# Patient Record
Sex: MALE | Race: WHITE | NOT HISPANIC OR LATINO | Employment: OTHER | URBAN - METROPOLITAN AREA
[De-identification: names, ages, dates, MRNs, and addresses within clinical notes are randomized per-mention and may not be internally consistent; named-entity substitution may affect disease eponyms.]

---

## 2017-05-03 ENCOUNTER — PREPPED CHART (OUTPATIENT)
Dept: URBAN - METROPOLITAN AREA CLINIC 43 | Facility: CLINIC | Age: 82
End: 2017-05-03

## 2017-06-09 ASSESSMENT — TONOMETRY: OS_IOP_MMHG: 13

## 2017-06-09 ASSESSMENT — VISUAL ACUITY
OD_SC: 20/30-2
OS_SC: 20/50-2

## 2017-06-16 ENCOUNTER — ESTABLISHED PATIENT (OUTPATIENT)
Dept: URBAN - METROPOLITAN AREA CLINIC 43 | Facility: CLINIC | Age: 82
End: 2017-06-16

## 2017-06-16 DIAGNOSIS — H35.3132: ICD-10-CM

## 2017-06-16 DIAGNOSIS — H34.8320: ICD-10-CM

## 2017-06-16 DIAGNOSIS — H43.813: ICD-10-CM

## 2017-06-16 DIAGNOSIS — E11.9: ICD-10-CM

## 2017-06-16 DIAGNOSIS — H35.352: ICD-10-CM

## 2017-06-16 DIAGNOSIS — H59.022: ICD-10-CM

## 2017-06-16 DIAGNOSIS — Z96.1: ICD-10-CM

## 2017-06-16 PROCEDURE — 92012 INTRM OPH EXAM EST PATIENT: CPT

## 2017-06-16 PROCEDURE — 9222650 BILAT EXTENDED OPHTHALMOSCOPY, F/U

## 2017-06-16 PROCEDURE — 92134 CPTRZ OPH DX IMG PST SGM RTA: CPT

## 2017-06-16 ASSESSMENT — VISUAL ACUITY
OD_SC: 20/30-2
OS_SC: 20/40-2
OD_CC: 20/40+1
OS_CC: 20/200

## 2017-06-16 ASSESSMENT — TONOMETRY
OS_IOP_MMHG: 10
OD_IOP_MMHG: 09

## 2017-07-05 ENCOUNTER — ESTABLISHED PATIENT (OUTPATIENT)
Dept: URBAN - METROPOLITAN AREA CLINIC 43 | Facility: CLINIC | Age: 82
End: 2017-07-05

## 2017-07-05 DIAGNOSIS — H35.352: ICD-10-CM

## 2017-07-05 PROCEDURE — 67028 INJECTION EYE DRUG: CPT

## 2017-07-05 ASSESSMENT — VISUAL ACUITY
OS_SC: 20/50-2
OD_SC: 20/30-1+2

## 2017-07-05 ASSESSMENT — TONOMETRY: OS_IOP_MMHG: 14

## 2017-09-13 ENCOUNTER — ESTABLISHED PATIENT (OUTPATIENT)
Dept: URBAN - METROPOLITAN AREA CLINIC 43 | Facility: CLINIC | Age: 82
End: 2017-09-13

## 2017-09-13 DIAGNOSIS — H35.352: ICD-10-CM

## 2017-09-13 PROCEDURE — 67028 INJECTION EYE DRUG: CPT

## 2017-09-14 ASSESSMENT — VISUAL ACUITY
OS_SC: 20/40
OD_SC: 20/40-1

## 2017-09-14 ASSESSMENT — TONOMETRY: OS_IOP_MMHG: 11

## 2017-10-13 ENCOUNTER — ESTABLISHED PATIENT (OUTPATIENT)
Dept: URBAN - METROPOLITAN AREA CLINIC 43 | Facility: CLINIC | Age: 82
End: 2017-10-13

## 2017-10-13 DIAGNOSIS — H35.3132: ICD-10-CM

## 2017-10-13 DIAGNOSIS — H43.813: ICD-10-CM

## 2017-10-13 DIAGNOSIS — E11.9: ICD-10-CM

## 2017-10-13 DIAGNOSIS — H34.8320: ICD-10-CM

## 2017-10-13 DIAGNOSIS — H35.352: ICD-10-CM

## 2017-10-13 PROCEDURE — 92134 CPTRZ OPH DX IMG PST SGM RTA: CPT

## 2017-10-13 PROCEDURE — 92014 COMPRE OPH EXAM EST PT 1/>: CPT

## 2017-10-13 PROCEDURE — 9222650 BILAT EXTENDED OPHTHALMOSCOPY, F/U

## 2017-10-13 ASSESSMENT — VISUAL ACUITY
OD_SC: 20/40-2
OS_SC: 20/60+2
OS_PH: 20/50-2

## 2017-10-13 ASSESSMENT — TONOMETRY
OS_IOP_MMHG: 13
OD_IOP_MMHG: 9

## 2017-10-18 ENCOUNTER — ESTABLISHED PATIENT (OUTPATIENT)
Dept: URBAN - METROPOLITAN AREA CLINIC 43 | Facility: CLINIC | Age: 82
End: 2017-10-18

## 2017-10-18 DIAGNOSIS — H35.352: ICD-10-CM

## 2017-10-18 PROCEDURE — 67028 INJECTION EYE DRUG: CPT

## 2017-10-18 ASSESSMENT — TONOMETRY: OS_IOP_MMHG: 15

## 2017-10-18 ASSESSMENT — VISUAL ACUITY
OD_SC: 20/40+1
OS_SC: 20/50

## 2017-11-20 ENCOUNTER — ESTABLISHED PATIENT (OUTPATIENT)
Dept: URBAN - METROPOLITAN AREA CLINIC 43 | Facility: CLINIC | Age: 82
End: 2017-11-20

## 2017-11-20 DIAGNOSIS — H59.022: ICD-10-CM

## 2017-11-20 DIAGNOSIS — H43.813: ICD-10-CM

## 2017-11-20 DIAGNOSIS — H40.053: ICD-10-CM

## 2017-11-20 DIAGNOSIS — Z96.1: ICD-10-CM

## 2017-11-20 DIAGNOSIS — E11.9: ICD-10-CM

## 2017-11-20 DIAGNOSIS — H35.3132: ICD-10-CM

## 2017-11-20 DIAGNOSIS — H35.352: ICD-10-CM

## 2017-11-20 DIAGNOSIS — H34.8320: ICD-10-CM

## 2017-11-20 PROCEDURE — 92134 CPTRZ OPH DX IMG PST SGM RTA: CPT

## 2017-11-20 PROCEDURE — 9222650 BILAT EXTENDED OPHTHALMOSCOPY, F/U

## 2017-11-20 PROCEDURE — 92014 COMPRE OPH EXAM EST PT 1/>: CPT

## 2017-11-20 ASSESSMENT — VISUAL ACUITY
OD_SC: 20/40+1
OS_SC: 20/60-2

## 2017-11-20 ASSESSMENT — TONOMETRY
OS_IOP_MMHG: 15
OD_IOP_MMHG: 14

## 2017-12-11 ENCOUNTER — IOP CHECK (OUTPATIENT)
Dept: URBAN - METROPOLITAN AREA CLINIC 43 | Facility: CLINIC | Age: 82
End: 2017-12-11

## 2017-12-11 DIAGNOSIS — H40.1132: ICD-10-CM

## 2017-12-11 PROCEDURE — 92083 EXTENDED VISUAL FIELD XM: CPT

## 2017-12-11 PROCEDURE — 92012 INTRM OPH EXAM EST PATIENT: CPT

## 2017-12-11 ASSESSMENT — VISUAL ACUITY
OD_SC: J12
OS_CC: J12
OS_SC: J12
OD_SC: 20/30+2
OS_SC: 20/80+1
OD_CC: J2

## 2017-12-11 ASSESSMENT — TONOMETRY
OS_IOP_MMHG: 10
OD_IOP_MMHG: 09

## 2017-12-20 ENCOUNTER — ESTABLISHED PATIENT (OUTPATIENT)
Dept: URBAN - METROPOLITAN AREA CLINIC 43 | Facility: CLINIC | Age: 82
End: 2017-12-20

## 2017-12-20 DIAGNOSIS — H34.8320: ICD-10-CM

## 2017-12-20 PROCEDURE — 67028 INJECTION EYE DRUG: CPT

## 2017-12-20 ASSESSMENT — TONOMETRY: OS_IOP_MMHG: 12

## 2017-12-20 ASSESSMENT — VISUAL ACUITY
OS_SC: 20/100-1
OD_SC: 20/40-1+1

## 2018-03-09 ENCOUNTER — ESTABLISHED PATIENT (OUTPATIENT)
Dept: URBAN - METROPOLITAN AREA CLINIC 43 | Facility: CLINIC | Age: 83
End: 2018-03-09

## 2018-03-09 DIAGNOSIS — E11.9: ICD-10-CM

## 2018-03-09 DIAGNOSIS — H35.3132: ICD-10-CM

## 2018-03-09 DIAGNOSIS — H34.8320: ICD-10-CM

## 2018-03-09 DIAGNOSIS — H43.813: ICD-10-CM

## 2018-03-09 DIAGNOSIS — H35.352: ICD-10-CM

## 2018-03-09 PROCEDURE — 92012 INTRM OPH EXAM EST PATIENT: CPT

## 2018-03-09 PROCEDURE — 92134 CPTRZ OPH DX IMG PST SGM RTA: CPT

## 2018-03-09 PROCEDURE — 9222650 BILAT EXTENDED OPHTHALMOSCOPY, F/U

## 2018-03-09 ASSESSMENT — VISUAL ACUITY
OD_SC: 20/40-2
OS_SC: 20/40-1+1

## 2018-03-09 ASSESSMENT — TONOMETRY
OS_IOP_MMHG: 13
OD_IOP_MMHG: 11

## 2018-04-27 ENCOUNTER — ESTABLISHED PATIENT (OUTPATIENT)
Dept: URBAN - METROPOLITAN AREA CLINIC 43 | Facility: CLINIC | Age: 83
End: 2018-04-27

## 2018-04-27 DIAGNOSIS — H35.3132: ICD-10-CM

## 2018-04-27 DIAGNOSIS — H59.022: ICD-10-CM

## 2018-04-27 DIAGNOSIS — H35.352: ICD-10-CM

## 2018-04-27 DIAGNOSIS — H43.813: ICD-10-CM

## 2018-04-27 DIAGNOSIS — E11.9: ICD-10-CM

## 2018-04-27 DIAGNOSIS — H34.8320: ICD-10-CM

## 2018-04-27 PROCEDURE — 92134 CPTRZ OPH DX IMG PST SGM RTA: CPT

## 2018-04-27 PROCEDURE — 9222650 BILAT EXTENDED OPHTHALMOSCOPY, F/U

## 2018-04-27 PROCEDURE — 92014 COMPRE OPH EXAM EST PT 1/>: CPT

## 2018-04-27 ASSESSMENT — VISUAL ACUITY
OS_SC: 20/40-1
OD_SC: 20/40+2

## 2018-04-27 ASSESSMENT — TONOMETRY
OD_IOP_MMHG: 12
OS_IOP_MMHG: 14

## 2018-05-16 ENCOUNTER — CLINIC PROCEDURE ONLY (OUTPATIENT)
Dept: URBAN - METROPOLITAN AREA CLINIC 43 | Facility: CLINIC | Age: 83
End: 2018-05-16

## 2018-05-16 DIAGNOSIS — H34.8320: ICD-10-CM

## 2018-05-16 PROCEDURE — 67028 INJECTION EYE DRUG: CPT

## 2018-05-16 ASSESSMENT — VISUAL ACUITY
OD_SC: 20/50+1
OS_SC: 20/50+2

## 2018-05-16 ASSESSMENT — TONOMETRY: OS_IOP_MMHG: 11

## 2018-06-11 ENCOUNTER — ESTABLISHED COMPREHENSIVE EXAM (OUTPATIENT)
Dept: URBAN - METROPOLITAN AREA CLINIC 43 | Facility: CLINIC | Age: 83
End: 2018-06-11

## 2018-06-11 DIAGNOSIS — H43.813: ICD-10-CM

## 2018-06-11 DIAGNOSIS — H35.352: ICD-10-CM

## 2018-06-11 DIAGNOSIS — H40.1132: ICD-10-CM

## 2018-06-11 DIAGNOSIS — H34.8320: ICD-10-CM

## 2018-06-11 DIAGNOSIS — E11.9: ICD-10-CM

## 2018-06-11 DIAGNOSIS — H35.3132: ICD-10-CM

## 2018-06-11 PROCEDURE — 92014 COMPRE OPH EXAM EST PT 1/>: CPT

## 2018-06-11 PROCEDURE — 92015 DETERMINE REFRACTIVE STATE: CPT

## 2018-06-11 ASSESSMENT — VISUAL ACUITY
OS_SC: J12+
OD_SC: J12
OD_CC: J2-
OS_CC: J1-
OS_SC: 20/30-2
OD_BAT: 20/200
OD_SC: 20/30-1
OS_BAT: 20/80

## 2018-06-11 ASSESSMENT — TONOMETRY
OD_IOP_MMHG: 6
OS_IOP_MMHG: 8

## 2018-07-13 ENCOUNTER — ESTABLISHED PATIENT (OUTPATIENT)
Dept: URBAN - METROPOLITAN AREA CLINIC 43 | Facility: CLINIC | Age: 83
End: 2018-07-13

## 2018-07-13 DIAGNOSIS — H34.8320: ICD-10-CM

## 2018-07-13 DIAGNOSIS — E11.9: ICD-10-CM

## 2018-07-13 DIAGNOSIS — H35.3122: ICD-10-CM

## 2018-07-13 DIAGNOSIS — H35.352: ICD-10-CM

## 2018-07-13 DIAGNOSIS — H43.813: ICD-10-CM

## 2018-07-13 DIAGNOSIS — H35.3211: ICD-10-CM

## 2018-07-13 DIAGNOSIS — H35.363: ICD-10-CM

## 2018-07-13 PROCEDURE — 92014 COMPRE OPH EXAM EST PT 1/>: CPT

## 2018-07-13 PROCEDURE — 92134 CPTRZ OPH DX IMG PST SGM RTA: CPT

## 2018-07-13 PROCEDURE — 92235 FLUORESCEIN ANGRPH MLTIFRAME: CPT

## 2018-07-13 PROCEDURE — 9222550 BILAT EXTENDED OPHTHALMOSCOPY, FIRST

## 2018-07-13 PROCEDURE — 92250 FUNDUS PHOTOGRAPHY W/I&R: CPT

## 2018-07-13 ASSESSMENT — TONOMETRY
OD_IOP_MMHG: 09
OS_IOP_MMHG: 10

## 2018-07-13 ASSESSMENT — VISUAL ACUITY
OS_SC: 20/40+1
OD_SC: 20/40+1

## 2018-07-25 ENCOUNTER — CLINIC PROCEDURE ONLY (OUTPATIENT)
Dept: URBAN - METROPOLITAN AREA CLINIC 43 | Facility: CLINIC | Age: 83
End: 2018-07-25

## 2018-07-25 VITALS — HEART RATE: 56 BPM | SYSTOLIC BLOOD PRESSURE: 160 MMHG | HEIGHT: 60 IN | DIASTOLIC BLOOD PRESSURE: 75 MMHG

## 2018-07-25 DIAGNOSIS — H35.3211: ICD-10-CM

## 2018-07-25 DIAGNOSIS — H35.3122: ICD-10-CM

## 2018-07-25 DIAGNOSIS — H34.8320: ICD-10-CM

## 2018-07-25 PROCEDURE — 6702850 BILATERAL INTRAVITREAL INJECTION

## 2018-07-25 ASSESSMENT — VISUAL ACUITY
OS_SC: 20/40+1
OD_SC: 20/40-1+1

## 2018-07-25 ASSESSMENT — TONOMETRY
OD_IOP_MMHG: 10
OS_IOP_MMHG: 11

## 2018-08-23 ENCOUNTER — ESTABLISHED PATIENT (OUTPATIENT)
Dept: URBAN - METROPOLITAN AREA CLINIC 43 | Facility: CLINIC | Age: 83
End: 2018-08-23

## 2018-08-23 DIAGNOSIS — H35.3211: ICD-10-CM

## 2018-08-23 DIAGNOSIS — H34.8320: ICD-10-CM

## 2018-08-23 DIAGNOSIS — H35.352: ICD-10-CM

## 2018-08-23 DIAGNOSIS — H35.3122: ICD-10-CM

## 2018-08-23 DIAGNOSIS — E11.9: ICD-10-CM

## 2018-08-23 DIAGNOSIS — H35.363: ICD-10-CM

## 2018-08-23 DIAGNOSIS — H43.813: ICD-10-CM

## 2018-08-23 PROCEDURE — 92012 INTRM OPH EXAM EST PATIENT: CPT

## 2018-08-23 PROCEDURE — 9222650 BILAT EXTENDED OPHTHALMOSCOPY, F/U

## 2018-08-23 PROCEDURE — 92134 CPTRZ OPH DX IMG PST SGM RTA: CPT

## 2018-08-23 ASSESSMENT — VISUAL ACUITY
OS_SC: 20/40+2
OD_SC: 20/40-2

## 2018-08-23 ASSESSMENT — TONOMETRY
OS_IOP_MMHG: 12
OD_IOP_MMHG: 10

## 2018-09-26 ENCOUNTER — CLINIC PROCEDURE ONLY (OUTPATIENT)
Dept: URBAN - METROPOLITAN AREA CLINIC 43 | Facility: CLINIC | Age: 83
End: 2018-09-26

## 2018-09-26 DIAGNOSIS — H35.3122: ICD-10-CM

## 2018-09-26 DIAGNOSIS — H35.3211: ICD-10-CM

## 2018-09-26 PROCEDURE — 6702850 BILATERAL INTRAVITREAL INJECTION

## 2018-09-26 ASSESSMENT — TONOMETRY
OD_IOP_MMHG: 10
OS_IOP_MMHG: 11

## 2018-09-26 ASSESSMENT — VISUAL ACUITY
OD_SC: 20/50+2
OS_SC: 20/40+2

## 2019-01-22 ENCOUNTER — ESTABLISHED COMPREHENSIVE EXAM (OUTPATIENT)
Dept: URBAN - METROPOLITAN AREA CLINIC 39 | Facility: CLINIC | Age: 84
End: 2019-01-22

## 2019-01-22 VITALS — HEIGHT: 60 IN | DIASTOLIC BLOOD PRESSURE: 56 MMHG | HEART RATE: 53 BPM | SYSTOLIC BLOOD PRESSURE: 141 MMHG

## 2019-01-22 DIAGNOSIS — E11.9: ICD-10-CM

## 2019-01-22 DIAGNOSIS — H35.352: ICD-10-CM

## 2019-01-22 DIAGNOSIS — H59.022: ICD-10-CM

## 2019-01-22 DIAGNOSIS — H35.3122: ICD-10-CM

## 2019-01-22 DIAGNOSIS — H34.8320: ICD-10-CM

## 2019-01-22 DIAGNOSIS — H35.363: ICD-10-CM

## 2019-01-22 DIAGNOSIS — H43.813: ICD-10-CM

## 2019-01-22 DIAGNOSIS — H35.3211: ICD-10-CM

## 2019-01-22 PROCEDURE — 92014 COMPRE OPH EXAM EST PT 1/>: CPT

## 2019-01-22 PROCEDURE — 9222650 BILAT EXTENDED OPHTHALMOSCOPY, F/U

## 2019-01-22 PROCEDURE — 92134 CPTRZ OPH DX IMG PST SGM RTA: CPT

## 2019-01-22 ASSESSMENT — VISUAL ACUITY
OD_SC: 20/40+1
OS_SC: 20/40

## 2019-01-22 ASSESSMENT — TONOMETRY
OS_IOP_MMHG: 11
OD_IOP_MMHG: 09

## 2019-01-30 ENCOUNTER — CLINIC PROCEDURE ONLY (OUTPATIENT)
Dept: URBAN - METROPOLITAN AREA CLINIC 43 | Facility: CLINIC | Age: 84
End: 2019-01-30

## 2019-01-30 VITALS — SYSTOLIC BLOOD PRESSURE: 129 MMHG | DIASTOLIC BLOOD PRESSURE: 63 MMHG | HEIGHT: 60 IN | HEART RATE: 64 BPM

## 2019-01-30 DIAGNOSIS — H34.8320: ICD-10-CM

## 2019-01-30 PROCEDURE — 67028 INJECTION EYE DRUG: CPT

## 2019-01-30 ASSESSMENT — VISUAL ACUITY
OD_SC: 20/30-1
OS_SC: 20/40-1

## 2019-01-30 ASSESSMENT — TONOMETRY
OS_IOP_MMHG: 11
OD_IOP_MMHG: 10

## 2019-04-03 ENCOUNTER — CLINIC PROCEDURE ONLY (OUTPATIENT)
Dept: URBAN - METROPOLITAN AREA CLINIC 43 | Facility: CLINIC | Age: 84
End: 2019-04-03

## 2019-04-03 DIAGNOSIS — H34.8320: ICD-10-CM

## 2019-04-03 PROCEDURE — 67028 INJECTION EYE DRUG: CPT

## 2019-04-03 ASSESSMENT — VISUAL ACUITY
OD_SC: 20/40-2
OS_SC: 20/50+2

## 2019-04-03 ASSESSMENT — TONOMETRY: OS_IOP_MMHG: 08

## 2019-05-17 ENCOUNTER — ESTABLISHED COMPREHENSIVE EXAM (OUTPATIENT)
Dept: URBAN - METROPOLITAN AREA CLINIC 43 | Facility: CLINIC | Age: 84
End: 2019-05-17

## 2019-05-17 DIAGNOSIS — H40.1132: ICD-10-CM

## 2019-05-17 DIAGNOSIS — H35.363: ICD-10-CM

## 2019-05-17 DIAGNOSIS — H35.3122: ICD-10-CM

## 2019-05-17 DIAGNOSIS — E11.9: ICD-10-CM

## 2019-05-17 DIAGNOSIS — H34.8320: ICD-10-CM

## 2019-05-17 DIAGNOSIS — H35.352: ICD-10-CM

## 2019-05-17 DIAGNOSIS — H35.3211: ICD-10-CM

## 2019-05-17 DIAGNOSIS — H59.022: ICD-10-CM

## 2019-05-17 DIAGNOSIS — H43.813: ICD-10-CM

## 2019-05-17 PROCEDURE — 92015 DETERMINE REFRACTIVE STATE: CPT

## 2019-05-17 PROCEDURE — 92014 COMPRE OPH EXAM EST PT 1/>: CPT

## 2019-05-17 ASSESSMENT — TONOMETRY
OD_IOP_MMHG: 10
OS_IOP_MMHG: 10

## 2019-05-17 ASSESSMENT — VISUAL ACUITY
OS_SC: 20/40+2
OD_CC: J1
OD_SC: 20/30-2
OU_SC: 20/25-2
OD_SC: J12
OS_SC: J12
OS_CC: J1

## 2019-05-24 ENCOUNTER — ESTABLISHED COMPREHENSIVE EXAM (OUTPATIENT)
Dept: URBAN - METROPOLITAN AREA CLINIC 43 | Facility: CLINIC | Age: 84
End: 2019-05-24

## 2019-05-24 DIAGNOSIS — E11.9: ICD-10-CM

## 2019-05-24 DIAGNOSIS — H35.3211: ICD-10-CM

## 2019-05-24 DIAGNOSIS — H35.352: ICD-10-CM

## 2019-05-24 DIAGNOSIS — H43.813: ICD-10-CM

## 2019-05-24 DIAGNOSIS — H35.363: ICD-10-CM

## 2019-05-24 DIAGNOSIS — H34.8320: ICD-10-CM

## 2019-05-24 DIAGNOSIS — H35.3122: ICD-10-CM

## 2019-05-24 PROCEDURE — 9222650 BILAT EXTENDED OPHTHALMOSCOPY, F/U

## 2019-05-24 PROCEDURE — 92012 INTRM OPH EXAM EST PATIENT: CPT

## 2019-05-24 PROCEDURE — 92134 CPTRZ OPH DX IMG PST SGM RTA: CPT

## 2019-05-24 ASSESSMENT — TONOMETRY
OS_IOP_MMHG: 10
OD_IOP_MMHG: 10

## 2019-05-24 ASSESSMENT — VISUAL ACUITY
OS_SC: 20/40-2
OD_SC: 20/40+1

## 2019-07-20 ENCOUNTER — INPATIENT (INPATIENT)
Facility: HOSPITAL | Age: 84
LOS: 3 days | Discharge: HOME | DRG: 286 | End: 2019-07-24
Attending: INTERNAL MEDICINE | Admitting: HOSPITALIST
Payer: MEDICARE

## 2019-07-20 VITALS
HEIGHT: 71 IN | HEART RATE: 51 BPM | OXYGEN SATURATION: 99 % | DIASTOLIC BLOOD PRESSURE: 71 MMHG | TEMPERATURE: 98 F | WEIGHT: 164.91 LBS | SYSTOLIC BLOOD PRESSURE: 187 MMHG | RESPIRATION RATE: 18 BRPM

## 2019-07-20 DIAGNOSIS — Z95.1 PRESENCE OF AORTOCORONARY BYPASS GRAFT: Chronic | ICD-10-CM

## 2019-07-20 LAB
ALBUMIN SERPL ELPH-MCNC: 4 G/DL — SIGNIFICANT CHANGE UP (ref 3.3–5.2)
ALP SERPL-CCNC: 75 U/L — SIGNIFICANT CHANGE UP (ref 40–120)
ALT FLD-CCNC: 12 U/L — SIGNIFICANT CHANGE UP
ANION GAP SERPL CALC-SCNC: 11 MMOL/L — SIGNIFICANT CHANGE UP (ref 5–17)
APPEARANCE UR: CLEAR — SIGNIFICANT CHANGE UP
APTT BLD: 30.9 SEC — SIGNIFICANT CHANGE UP (ref 27.5–36.3)
AST SERPL-CCNC: 17 U/L — SIGNIFICANT CHANGE UP
BACTERIA # UR AUTO: ABNORMAL
BILIRUB SERPL-MCNC: 0.4 MG/DL — SIGNIFICANT CHANGE UP (ref 0.4–2)
BILIRUB UR-MCNC: NEGATIVE — SIGNIFICANT CHANGE UP
BUN SERPL-MCNC: 27 MG/DL — HIGH (ref 8–20)
CALCIUM SERPL-MCNC: 9.8 MG/DL — SIGNIFICANT CHANGE UP (ref 8.6–10.2)
CHLORIDE SERPL-SCNC: 106 MMOL/L — SIGNIFICANT CHANGE UP (ref 98–107)
CO2 SERPL-SCNC: 24 MMOL/L — SIGNIFICANT CHANGE UP (ref 22–29)
COLOR SPEC: YELLOW — SIGNIFICANT CHANGE UP
CREAT SERPL-MCNC: 1.06 MG/DL — SIGNIFICANT CHANGE UP (ref 0.5–1.3)
DIFF PNL FLD: NEGATIVE — SIGNIFICANT CHANGE UP
EPI CELLS # UR: SIGNIFICANT CHANGE UP
GLUCOSE SERPL-MCNC: 148 MG/DL — HIGH (ref 70–115)
GLUCOSE UR QL: NEGATIVE MG/DL — SIGNIFICANT CHANGE UP
HCT VFR BLD CALC: 34.3 % — LOW (ref 39–50)
HGB BLD-MCNC: 11.8 G/DL — LOW (ref 13–17)
INR BLD: 0.96 RATIO — SIGNIFICANT CHANGE UP (ref 0.88–1.16)
KETONES UR-MCNC: NEGATIVE — SIGNIFICANT CHANGE UP
LEUKOCYTE ESTERASE UR-ACNC: NEGATIVE — SIGNIFICANT CHANGE UP
MAGNESIUM SERPL-MCNC: 1.8 MG/DL — SIGNIFICANT CHANGE UP (ref 1.6–2.6)
MCHC RBC-ENTMCNC: 33.2 PG — SIGNIFICANT CHANGE UP (ref 27–34)
MCHC RBC-ENTMCNC: 34.4 GM/DL — SIGNIFICANT CHANGE UP (ref 32–36)
MCV RBC AUTO: 96.6 FL — SIGNIFICANT CHANGE UP (ref 80–100)
NITRITE UR-MCNC: NEGATIVE — SIGNIFICANT CHANGE UP
NT-PROBNP SERPL-SCNC: 782 PG/ML — HIGH (ref 0–300)
PH UR: 6.5 — SIGNIFICANT CHANGE UP (ref 5–8)
PLATELET # BLD AUTO: 232 K/UL — SIGNIFICANT CHANGE UP (ref 150–400)
POTASSIUM SERPL-MCNC: 4.6 MMOL/L — SIGNIFICANT CHANGE UP (ref 3.5–5.3)
POTASSIUM SERPL-SCNC: 4.6 MMOL/L — SIGNIFICANT CHANGE UP (ref 3.5–5.3)
PROT SERPL-MCNC: 6.6 G/DL — SIGNIFICANT CHANGE UP (ref 6.6–8.7)
PROT UR-MCNC: 15 MG/DL
PROTHROM AB SERPL-ACNC: 11 SEC — SIGNIFICANT CHANGE UP (ref 10–12.9)
RBC # BLD: 3.55 M/UL — LOW (ref 4.2–5.8)
RBC # FLD: 12.3 % — SIGNIFICANT CHANGE UP (ref 10.3–14.5)
RBC CASTS # UR COMP ASSIST: SIGNIFICANT CHANGE UP /HPF (ref 0–4)
SODIUM SERPL-SCNC: 141 MMOL/L — SIGNIFICANT CHANGE UP (ref 135–145)
SP GR SPEC: 1.01 — SIGNIFICANT CHANGE UP (ref 1.01–1.02)
TROPONIN T SERPL-MCNC: <0.01 NG/ML — SIGNIFICANT CHANGE UP (ref 0–0.06)
UROBILINOGEN FLD QL: NEGATIVE MG/DL — SIGNIFICANT CHANGE UP
WBC # BLD: 7.12 K/UL — SIGNIFICANT CHANGE UP (ref 3.8–10.5)
WBC # FLD AUTO: 7.12 K/UL — SIGNIFICANT CHANGE UP (ref 3.8–10.5)
WBC UR QL: SIGNIFICANT CHANGE UP

## 2019-07-20 PROCEDURE — 93010 ELECTROCARDIOGRAM REPORT: CPT

## 2019-07-20 PROCEDURE — 99218: CPT

## 2019-07-20 PROCEDURE — 70498 CT ANGIOGRAPHY NECK: CPT | Mod: 26

## 2019-07-20 PROCEDURE — 70496 CT ANGIOGRAPHY HEAD: CPT | Mod: 26

## 2019-07-20 PROCEDURE — 71045 X-RAY EXAM CHEST 1 VIEW: CPT | Mod: 26

## 2019-07-20 RX ORDER — ATORVASTATIN CALCIUM 80 MG/1
10 TABLET, FILM COATED ORAL AT BEDTIME
Refills: 0 | Status: DISCONTINUED | OUTPATIENT
Start: 2019-07-20 | End: 2019-07-23

## 2019-07-20 RX ORDER — HYDRALAZINE HCL 50 MG
10 TABLET ORAL ONCE
Refills: 0 | Status: DISCONTINUED | OUTPATIENT
Start: 2019-07-20 | End: 2019-07-20

## 2019-07-20 RX ORDER — METFORMIN HYDROCHLORIDE 850 MG/1
500 TABLET ORAL
Refills: 0 | Status: DISCONTINUED | OUTPATIENT
Start: 2019-07-20 | End: 2019-07-21

## 2019-07-20 RX ORDER — SODIUM CHLORIDE 9 MG/ML
1000 INJECTION, SOLUTION INTRAVENOUS ONCE
Refills: 0 | Status: COMPLETED | OUTPATIENT
Start: 2019-07-20 | End: 2019-07-20

## 2019-07-20 RX ORDER — HYDRALAZINE HCL 50 MG
5 TABLET ORAL ONCE
Refills: 0 | Status: COMPLETED | OUTPATIENT
Start: 2019-07-20 | End: 2019-07-20

## 2019-07-20 RX ORDER — AMLODIPINE BESYLATE 2.5 MG/1
5 TABLET ORAL DAILY
Refills: 0 | Status: DISCONTINUED | OUTPATIENT
Start: 2019-07-20 | End: 2019-07-24

## 2019-07-20 RX ORDER — AMLODIPINE BESYLATE 2.5 MG/1
5 TABLET ORAL ONCE
Refills: 0 | Status: COMPLETED | OUTPATIENT
Start: 2019-07-20 | End: 2019-07-20

## 2019-07-20 RX ORDER — PANTOPRAZOLE SODIUM 20 MG/1
40 TABLET, DELAYED RELEASE ORAL
Refills: 0 | Status: DISCONTINUED | OUTPATIENT
Start: 2019-07-20 | End: 2019-07-24

## 2019-07-20 RX ORDER — CLOPIDOGREL BISULFATE 75 MG/1
75 TABLET, FILM COATED ORAL DAILY
Refills: 0 | Status: DISCONTINUED | OUTPATIENT
Start: 2019-07-20 | End: 2019-07-24

## 2019-07-20 RX ADMIN — Medication 5 MILLIGRAM(S): at 17:14

## 2019-07-20 RX ADMIN — ATORVASTATIN CALCIUM 10 MILLIGRAM(S): 80 TABLET, FILM COATED ORAL at 22:15

## 2019-07-20 RX ADMIN — METFORMIN HYDROCHLORIDE 500 MILLIGRAM(S): 850 TABLET ORAL at 22:15

## 2019-07-20 RX ADMIN — AMLODIPINE BESYLATE 5 MILLIGRAM(S): 2.5 TABLET ORAL at 14:27

## 2019-07-20 RX ADMIN — SODIUM CHLORIDE 1000 MILLILITER(S): 9 INJECTION, SOLUTION INTRAVENOUS at 13:41

## 2019-07-20 NOTE — ED CDU PROVIDER INITIAL DAY NOTE - DETAILS
83m c/o lightheadedness x 1 month after increasing metoprolol dosing from 25mg PO QD to BID. Found to have 50% stenosis R sided ICA and 70% of left ICA  -Telemetry  -MR head  -Vasc consult

## 2019-07-20 NOTE — ED PROVIDER NOTE - OBJECTIVE STATEMENT
83yom w/ HTN, DM2, CAD w/ CABG has been having lightheadedness for the past month. He reports he gets transient lightheadedness, particularly in the morning when he first wakes up/stands up which then resolves and he remains asymptomatic throughout the day. Today he had the same but his symptoms have not resolved. Seated he has no symptoms but when standing he again gets lightheaded. No headache, vision changes, palpitations, chest pain, shortness of breath, dyspnea on exertion. No head trauma.

## 2019-07-20 NOTE — ED ADULT TRIAGE NOTE - CHIEF COMPLAINT QUOTE
Patient arrived via wheelchair, awake, alert, and oriented times 3, breathing unlabored.  Patient complaining of dizziness and lightheaded which has been present every morning for the past month as per patient.  No blurry vision.  No complaints of pain. Patient arrived via wheelchair, awake, alert, and oriented times 3, breathing unlabored.  Patient complaining of dizziness and lightheaded which has been present every morning for the past month as per patient.  No blurry vision.  No complaints of pain.  patient also states elevated BP, took HTN meds this morning

## 2019-07-20 NOTE — ED CDU PROVIDER INITIAL DAY NOTE - ATTENDING CONTRIBUTION TO CARE
pt with htn bradycardia occ light headed feeling   + carotid artery stenosis. appreciate vasc consult  will fu outstanding studies  I, Saumya Childers, performed the initial face to face bedside interview with this patient regarding history of present illness, review of symptoms and relevant past medical, social and family history.  I completed an independent physical examination.  I was the initial provider who evaluated this patient. I have signed out the follow up of any pending tests (i.e. labs, radiological studies) to the ACP.  I have communicated the patient’s plan of care and disposition with the ACP.

## 2019-07-20 NOTE — CONSULT NOTE ADULT - ASSESSMENT
83yoM with a history of HTN, DM2, CAD s/p CABG presenting to the ED with dizziness for the past 1 month after doubling up on his metoprolol. In the ED, CTA found R ICA 50% stenosis and LICA of 70%. Patient has known about this and follows up with his PMD in [place] on a routine basis. Last imaging study was done 3 months ago at which his PMD had stated it was "stable". Patient is asymptomatic.  - No further vascular intervention needed at this time  - F/u with PMD and vascular surgeon in [place]

## 2019-07-20 NOTE — ED CDU PROVIDER INITIAL DAY NOTE - SHIFT CHANGE DETAILS
Received sign out from VIVIANA AGUILA, discussed assessment and plan in depth, pt with lightheadedness x 1 month but today symptoms did not resolve, CT head unremarkable, cta of the neck showed 50% stenosis of the right ICA and 70% stenosis of the left ICA, vascular was consulted, will come and see pt.

## 2019-07-20 NOTE — ED STATDOCS - PROGRESS NOTE DETAILS
82 y/o M pt with hx of HTN, DM and CAD presents to ED c/o constant dizziness and lightheadedness x 1 month. Indicates has experienced these symptoms daily. Symptoms are worsened in the AM, but improved after moving around. Dizziness is described as he is spinning Per son at bedside, was HTN at 208 this AM and almost lost his balance. Took Metoprolol this AM No difficulty talking. No CP, SOB. On Plavix. PE: mild nystagmus on left. Will be transferred to main on monitor.

## 2019-07-20 NOTE — ED PROVIDER NOTE - CLINICAL SUMMARY MEDICAL DECISION MAKING FREE TEXT BOX
Postural lightheadedness but no orthostatic vital changes, neuro intact with no signs of acute stroke. Bradycardic in the 50s, sometimes down to 40s but sinus. Possibly due to excess beta blockade. CTA of the head and neck performed revealed 50-70% carotid stenosis which is baseline per PMD in [place]. BP over 200 despite medication adherence. In consultation with PMD will start norvasc 5mg daily, cut metoprolol in half. Will observe overnight in CDU to monitor for improvement in bradycardia after metoprolol wears off, and follow BP in the AM. COnsider MRI if remaining persistently symptomatic.

## 2019-07-21 DIAGNOSIS — G45.9 TRANSIENT CEREBRAL ISCHEMIC ATTACK, UNSPECIFIED: ICD-10-CM

## 2019-07-21 DIAGNOSIS — R42 DIZZINESS AND GIDDINESS: ICD-10-CM

## 2019-07-21 LAB
GLUCOSE BLDC GLUCOMTR-MCNC: 104 MG/DL — HIGH (ref 70–99)
GLUCOSE BLDC GLUCOMTR-MCNC: 127 MG/DL — HIGH (ref 70–99)

## 2019-07-21 PROCEDURE — 99223 1ST HOSP IP/OBS HIGH 75: CPT

## 2019-07-21 PROCEDURE — 99217: CPT

## 2019-07-21 PROCEDURE — 93880 EXTRACRANIAL BILAT STUDY: CPT | Mod: 26

## 2019-07-21 RX ORDER — DEXTROSE 50 % IN WATER 50 %
25 SYRINGE (ML) INTRAVENOUS ONCE
Refills: 0 | Status: DISCONTINUED | OUTPATIENT
Start: 2019-07-21 | End: 2019-07-24

## 2019-07-21 RX ORDER — ENOXAPARIN SODIUM 100 MG/ML
40 INJECTION SUBCUTANEOUS DAILY
Refills: 0 | Status: DISCONTINUED | OUTPATIENT
Start: 2019-07-21 | End: 2019-07-24

## 2019-07-21 RX ORDER — GLUCAGON INJECTION, SOLUTION 0.5 MG/.1ML
1 INJECTION, SOLUTION SUBCUTANEOUS ONCE
Refills: 0 | Status: DISCONTINUED | OUTPATIENT
Start: 2019-07-21 | End: 2019-07-24

## 2019-07-21 RX ORDER — INSULIN LISPRO 100/ML
VIAL (ML) SUBCUTANEOUS
Refills: 0 | Status: DISCONTINUED | OUTPATIENT
Start: 2019-07-21 | End: 2019-07-24

## 2019-07-21 RX ORDER — SODIUM CHLORIDE 9 MG/ML
1000 INJECTION, SOLUTION INTRAVENOUS
Refills: 0 | Status: DISCONTINUED | OUTPATIENT
Start: 2019-07-21 | End: 2019-07-24

## 2019-07-21 RX ORDER — ASPIRIN/CALCIUM CARB/MAGNESIUM 324 MG
81 TABLET ORAL DAILY
Refills: 0 | Status: DISCONTINUED | OUTPATIENT
Start: 2019-07-21 | End: 2019-07-24

## 2019-07-21 RX ORDER — METOPROLOL TARTRATE 50 MG
25 TABLET ORAL DAILY
Refills: 0 | Status: DISCONTINUED | OUTPATIENT
Start: 2019-07-21 | End: 2019-07-22

## 2019-07-21 RX ORDER — DEXTROSE 50 % IN WATER 50 %
15 SYRINGE (ML) INTRAVENOUS ONCE
Refills: 0 | Status: DISCONTINUED | OUTPATIENT
Start: 2019-07-21 | End: 2019-07-24

## 2019-07-21 RX ORDER — BRIMONIDINE TARTRATE 2 MG/MG
1 SOLUTION/ DROPS OPHTHALMIC
Refills: 0 | Status: DISCONTINUED | OUTPATIENT
Start: 2019-07-21 | End: 2019-07-24

## 2019-07-21 RX ORDER — DEXTROSE 50 % IN WATER 50 %
12.5 SYRINGE (ML) INTRAVENOUS ONCE
Refills: 0 | Status: DISCONTINUED | OUTPATIENT
Start: 2019-07-21 | End: 2019-07-24

## 2019-07-21 RX ORDER — HYDRALAZINE HCL 50 MG
10 TABLET ORAL EVERY 6 HOURS
Refills: 0 | Status: DISCONTINUED | OUTPATIENT
Start: 2019-07-21 | End: 2019-07-23

## 2019-07-21 RX ORDER — TIMOLOL 0.5 %
1 DROPS OPHTHALMIC (EYE)
Refills: 0 | Status: DISCONTINUED | OUTPATIENT
Start: 2019-07-21 | End: 2019-07-24

## 2019-07-21 RX ADMIN — METFORMIN HYDROCHLORIDE 500 MILLIGRAM(S): 850 TABLET ORAL at 05:38

## 2019-07-21 RX ADMIN — CLOPIDOGREL BISULFATE 75 MILLIGRAM(S): 75 TABLET, FILM COATED ORAL at 14:19

## 2019-07-21 RX ADMIN — Medication 25 MILLIGRAM(S): at 17:34

## 2019-07-21 RX ADMIN — PANTOPRAZOLE SODIUM 40 MILLIGRAM(S): 20 TABLET, DELAYED RELEASE ORAL at 05:38

## 2019-07-21 RX ADMIN — Medication 81 MILLIGRAM(S): at 17:13

## 2019-07-21 RX ADMIN — ATORVASTATIN CALCIUM 10 MILLIGRAM(S): 80 TABLET, FILM COATED ORAL at 21:35

## 2019-07-21 RX ADMIN — ENOXAPARIN SODIUM 40 MILLIGRAM(S): 100 INJECTION SUBCUTANEOUS at 17:34

## 2019-07-21 RX ADMIN — AMLODIPINE BESYLATE 5 MILLIGRAM(S): 2.5 TABLET ORAL at 05:38

## 2019-07-21 NOTE — ED ADULT NURSE NOTE - CHIEF COMPLAINT QUOTE
Patient arrived via wheelchair, awake, alert, and oriented times 3, breathing unlabored.  Patient complaining of dizziness and lightheaded which has been present every morning for the past month as per patient.  No blurry vision.  No complaints of pain.  patient also states elevated BP, took HTN meds this morning

## 2019-07-21 NOTE — PROGRESS NOTE ADULT - ASSESSMENT
83yoM with a history of HTN, DM2, CAD s/p CABG presenting to the ED with dizziness for the past 1 month after doubling up on his metoprolol. In the ED, CTA found R ICA 50% stenosis and LICA of 70%. Patient has known about this and follows up with his PMD in [place] on a routine basis. Last imaging study was done 3 months ago at which his PMD had stated it was "stable". Patient is asymptomatic.    - F/u b/l duplex US of carotids today  - Discharge per ED pending stable carotid duplex US

## 2019-07-21 NOTE — CONSULT NOTE ADULT - SUBJECTIVE AND OBJECTIVE BOX
HPI:  83yoM with HTN, DM2, CAD s/p CABG, and known carotid stenosis presenting to the ED for dizziness for the past month. Patient states his PMD had doubled his metoprolol earlier this month and since then he's been feeling week and dizzy. He is also visiting [place] to see his son from [place] and was supposed to go home Monday, but now states he will stay a few more days until he figures out his dizziness. Patient received a CTA in the ED which reported R ICA of 50% and L ICA of 70%. Per patient, he has a vascular surgeon who is following and his last carotid duplex was 3 months ago which his physician said was a stable exam. Patient gets repeat image studies and follows closely with his doctors.  reports dizziness in AM persistent and new onset since dose adjust of metroprolol - but yesterday was more intense and prolonged than usual. No weakness arms/legs or altered speech. No LOC/Vertigo/Nausea or Vomit.     PAST MEDICAL & SURGICAL HISTORY:  DM2 (diabetes mellitus, type 2)  CAD (coronary artery disease)  HTN (hypertension)  S/P CABG (coronary artery bypass graft)    REVIEW OF SYSTEMS:    CONSTITUTIONAL: No fever  EYES: No eye pain,   ENMT:  No sinus or throat pain  NECK: No pain or stiffness  RESPIRATORY: No cough, No hemoptysis; No shortness of breath  CARDIOVASCULAR: No acute chest pain, palpitations,  or leg swelling  GASTROINTESTINAL: No abdominal pain. No nausea, vomiting, or hematemesis;  No melena or hematochezia.  GENITOURINARY: No  hematuria, or incontinence  NEUROLOGICAL: No headaches, memory loss,   SKIN: No itching, rashes, or lesions   LYMPH NODES: No enlarged glands  ENDOCRINE: No heat or cold intolerance;   MUSCULOSKELETAL: No joint swelling; No extremity pain  PSYCHIATRIC: No depression, anxiety, mood swings, or difficulty sleeping  HEME/LYMPH: No easy bruising, or bleeding gums    MEDICATIONS  (STANDING):  amLODIPine   Tablet 5 milliGRAM(s) Oral daily  atorvastatin 10 milliGRAM(s) Oral at bedtime  clopidogrel Tablet 75 milliGRAM(s) Oral daily  metFORMIN 500 milliGRAM(s) Oral two times a day  pantoprazole    Tablet 40 milliGRAM(s) Oral before breakfast    MEDICATIONS  (PRN):      Allergies  No Known Allergies    Intolerances    SOCIAL HISTORY:    FAMILY HISTORY:    PHYSICAL EXAM:  Vital Signs Last 24 Hrs  T(F): 97.6 (07-19 @ 05:30)  HR: 57 (19 @ 05:30)  BP: 167/76 (19 @ 05:30)  RR: 18 (19 @ 05:30)    GENERAL: NAD, well-groomed, well-developed  HEAD:  Atraumatic, Normocephalic  EYES: EOMI, PERRLA, conjunctiva and sclera clear  NECK: Supple, No JVD, thyroid non-palpable  NERVOUS SYSTEM:   awaken alert interactive. Oriented X3, speech and language normal, no aphasia, pupils equal and reactive. visual fields full to movement. EOMI. no nystagmus. facial strength symmetric. no meningismus.  no pronator drift. no finger to nose ataxia. rapid hand movements symmetric. no rest tremor.  Motor Strength 5/5 bilateral upper and lower extremities; DTRs Knee jerks absent symmetric, plantar responses flexor bilaterally, motor tone normal.  sensory exam normal to light touch,  and vibration,   HEART: Regular rate and rhythm; No murmurs audible  Peripheral - no edema in the legs.    LABS:             11.8   7.12  )-----------( 232      ( 2019 11:05 )             34.3   07-20  141  |  106  |  27.0<H>  ----------------------------<  148<H>  4.6   |  24.0  |  1.06  Ca    9.8      2019 11:05  Mg     1.8     07-20  TPro  6.6  /  Alb  4.0  /  TBili  0.4  /  DBili  x   /  AST  17  /  ALT  12  /  AlkPhos  75  07-20  PT/INR - ( 2019 11:05 )   PT: 11.0 sec;   INR: 0.96 ratio    PTT - ( 2019 11:05 )  PTT:30.9 sec  Urinalysis Basic - ( 2019 14:21 )  Color: Yellow / Appearance: Clear / S.010 / pH: x  Gluc: x / Ketone: Negative  / Bili: Negative / Urobili: Negative mg/dL   Blood: x / Protein: 15 mg/dL / Nitrite: Negative   Leuk Esterase: Negative / RBC: 0-2 /HPF / WBC 0-2   Sq Epi: x / Non Sq Epi: Occasional / Bacteria: Occasional    RADIOLOGY & ADDITIONAL STUDIES:  EXAM:  CT ANGIO NECK (W)AW IC                        EXAM:  CT ANGIO BRAIN (W)AW IC                        PROCEDURE DATE:  2019    INTERPRETATION:  CT angiogram head and neck with and without contrast  History syncope and dizziness  Multiplanar MIPS included  Contrast Omnipaque 94 cc  Precontrast CT of the brain is negative for hemorrhage, cortical edema, mass effect or hydrocephalus.  Examination of the intracranial circulation is negative for focal occlusion, aneurysm or significant stenosis.  Examination of the cervical circulation demonstrates heavy bilateral carotid bulb calcification, limiting specificity with estimated 50 % stenosis on the right and 70% stenosis on the left. The vertebral arteries are codominant and both patent. There is no occlusion or dissection.  IMPRESSION:  No acute findings. Bilateral ICA stenosis as above

## 2019-07-21 NOTE — H&P ADULT - ASSESSMENT
83M with recurrent episodes of dizziness worsening over the past month found to have bradycardia and aortic stenosis.    Dizziness - Noted to have orthostatic changes on examination. Status post intravenous fluid resuscitation. Cardiology and Neurology consultation noted. MRI pending.    Aortic stenosis - Discussed with Cardiology. Bedside ultrasound noted severe aortic stenosis. For official echocardiogram to further evaluate.    Bradycardia - Sinus bradycardia on EKG. Dose of metoprolol to be decreased.    Hypertension - Close blood pressure monitoring. Amlodipine to be initiated.    Carotid stenosis - CT angiogram and carotid doppler results noted. Vascular Surgery consultation noted. No intervention recommended at this time.    CAD - On aspirin and clopidogrel. No evidence of acute ischemia.    Diabetes - Insulin coverage, close monitoring of blood glucose levels.    Discussed with the patient and his wife at the bedside.

## 2019-07-21 NOTE — PROGRESS NOTE ADULT - SUBJECTIVE AND OBJECTIVE BOX
SUBJECTIVE: No acute events overnight. Patient states he closely follows up with physician in [place] and gets regular USs of his carotids which have been stable. No n/v/f/c/sob/headache.       MEDICATIONS  (STANDING):  amLODIPine   Tablet 5 milliGRAM(s) Oral daily  atorvastatin 10 milliGRAM(s) Oral at bedtime  clopidogrel Tablet 75 milliGRAM(s) Oral daily  metFORMIN 500 milliGRAM(s) Oral two times a day  pantoprazole    Tablet 40 milliGRAM(s) Oral before breakfast    MEDICATIONS  (PRN):      Vital Signs Last 24 Hrs  T(C): 36.7 (2019 11:14), Max: 36.8 (2019 23:10)  T(F): 98 (2019 11:14), Max: 98.2 (2019 23:10)  HR: 56 (2019 11:14) (49 - 61)  BP: 154/64 (2019 11:14) (120/66 - 216/81)  BP(mean): 84 (2019 01:36) (84 - 84)  RR: 16 (2019 11:14) (16 - 20)  SpO2: 99% (2019 11:14) (95% - 100%)    PE  GEN: NAD, alert and oriented x 3  HEENT: WNL  CHEST: Symmetrical chest rise, breath sounds CTAB  HEART: RRR, non-muffled heart sounds  ABD: Soft, non-tender, non-distended  EXT/VASC: 2+dp/pt, 2+ radial b/l       I&O's Detail    2019 07:01  -  2019 07:00  --------------------------------------------------------  IN:  Total IN: 0 mL    OUT:    Voided: 300 mL  Total OUT: 300 mL    Total NET: -300 mL          LABS:                        11.8   7.12  )-----------( 232      ( 2019 11:05 )             34.3     07-20    141  |  106  |  27.0<H>  ----------------------------<  148<H>  4.6   |  24.0  |  1.06    Ca    9.8      2019 11:05  Mg     1.8         TPro  6.6  /  Alb  4.0  /  TBili  0.4  /  DBili  x   /  AST  17  /  ALT  12  /  AlkPhos  75      PT/INR - ( 2019 11:05 )   PT: 11.0 sec;   INR: 0.96 ratio         PTT - ( 2019 11:05 )  PTT:30.9 sec  Urinalysis Basic - ( 2019 14:21 )    Color: Yellow / Appearance: Clear / S.010 / pH: x  Gluc: x / Ketone: Negative  / Bili: Negative / Urobili: Negative mg/dL   Blood: x / Protein: 15 mg/dL / Nitrite: Negative   Leuk Esterase: Negative / RBC: 0-2 /HPF / WBC 0-2   Sq Epi: x / Non Sq Epi: Occasional / Bacteria: Occasional        RADIOLOGY & ADDITIONAL STUDIES:

## 2019-07-21 NOTE — CONSULT NOTE ADULT - ASSESSMENT
82 y/o male PMH DM, CAD s/p CABG/PCI, HTN presents to ED with c/o dizziness. Wife at bedside, pt states started about a month ago, getting light headed in the morning, but eventually goes away. Yesterday dizziness would not go away, worse when standing from laying down. Recent BP medication change one month ago increased metoprolol 25mg to BID, can not recall why BP meds were changed.     Dizziness  - Medication change with past month  - bradycardia on monitor   - EKG- SB, NSST-T abnl  - orthostatics ordered  - vascular- moderate stable stenosis LICA; follow up in [place].  - CT head- no acute findings   - ED physician spoke with cardiologist in [place]; add norvasc 5mg daily, cut metoprolol in half  - Pt to follow up with primary physicians when he returns home.     pending attending evaluation/attestation

## 2019-07-21 NOTE — ED ADULT NURSE REASSESSMENT NOTE - NS ED NURSE REASSESS COMMENT FT1
Care endorsed to [facility].
Patient awaiting Neuro and Cards consult. Patient ambulatory, denies any dizziness while ambulating, steady. VSS. Patient and wife in understanding of plan of care. Will continue to monitor.
Pt care assumed 1930, report received from offgoing nurse JESSICA, RN. Pt received AOx3 resting comfortably in bed, respirations even and unlabored, no apparent distress noted at this time. Pt denies any complaints at this time. Pt safety maintained. pt educated on plan of care, pt able to successfully teach back plan of care to RN, RN will continue to reeducate pt during hospital stay.
Pt is resting in bed comfortably at this time, no apparent distress noted at this time. pt safety maintained. Pt denies any complaints at this time. pt educated on plan of care, plan of care taught back to RN. plan of care education deemed proficient through successful teach back. will continue to reeducate pt regarding plan of care.
Assumed care of the patient at 0800. Patient A&Ox3. No s/s of distress or pain. Remains NSR to Sinus lucas on CM. Patient sitting in bed, states intermittent dizziness persists when sitting up PIV patent. Denies SOB or CP. No edema noted. Patient awaiting neuro, cards consults and MRI testing. Patient and wife that is present at the bedside in understanding of plan of care. Patient with no further questions for the nurse. Will continue to monitor.
Assumed care of the patient at 1900. Patient transferred to observation unit CDU 7. Patient A&Ox3, wife at the bedside. Patient states have been feeling dizzy for past few weeks, per wife patient had been taking "2 tabs of metoprolol, dose recently increased by his NP". Now patient in Sinus Alexei to NSR on CM. BP improving. Patient  awaiting reeval of HR and BP overnight. Patient in understanding of plan of care. Patient with no further questions for the nurse. Will continue to monitor.

## 2019-07-21 NOTE — H&P ADULT - NSHPSOCIALHISTORY_GEN_ALL_CORE
Denied tobacco use  Minimal social alcohol use  No illicit drug use    Lives at home in [place] with his wife    Family History: Parents were without coronary artery disease

## 2019-07-21 NOTE — CONSULT NOTE ADULT - ATTENDING COMMENTS
pt seen and examined. Worsening SOB over the past few month. Travelled from [place].  SOB as well yesterday. He was dizzy with low Hr.   Pt has increased BB recently which maybe the reason for lightheadedness.  He has a murmur of severe aortic valve stenosis.  Admit for further workup.   TTE in AM  I agree with above a/p.

## 2019-07-21 NOTE — CONSULT NOTE ADULT - SUBJECTIVE AND OBJECTIVE BOX
Patient is a 83y old  Male who presents with a chief complaint of TIA? (2019 10:01)    HPI: 82 y/o male PMH DM, CAD s/p CABG/PCI, HTN presents to ED with c/o dizziness. Wife at bedside, pt states started about a month ago, getting light headed in the morning, but eventually goes away. Yesterday dizziness would not go away, worse when standing from laying down. Recent BP medication change one month ago increased metoprolol 25mg to BID, can not recall why BP meds were changed. Pt followed closely by cardiologist in [place], states have appt every three months. Denies fever, chills, cough, phlegm production, shortness of breath, dyspnea on exertion, orthopnea, PND, edema, chest pain, pressure, palpitations, irregular, fast heart beat, nausea, vomiting, melena, rectal bleed, hematuria, syncope, near syncope.         PAST MEDICAL & SURGICAL HISTORY:  DM2 (diabetes mellitus, type 2)  CAD (coronary artery disease)  HTN (hypertension)  S/P CABG (coronary artery bypass graft)      PREVIOUS DIAGNOSTIC TESTING:      Allergies  No Known Allergies  Intoleranes      MEDICATIONS  (STANDING):  amLODIPine   Tablet 5 milliGRAM(s) Oral daily  atorvastatin 10 milliGRAM(s) Oral at bedtime  clopidogrel Tablet 75 milliGRAM(s) Oral daily  metFORMIN 500 milliGRAM(s) Oral two times a day  pantoprazole  Tablet 40 milliGRAM(s) Oral before breakfast    MEDICATIONS  (PRN):      FAMILY HISTORY:      SOCIAL HISTORY:  CIGARETTES: Denies   ALCOHOL: Denies     REVIEW OF SYSTEMS:  CONSTITUTIONAL: No fever, weight loss, or fatigue  EYES: No eye pain, visual disturbances, or discharge  ENMT:  No difficulty hearing, tinnitus, vertigo; No sinus or throat pain  NECK: No pain or stiffness  RESPIRATORY: No cough, wheezing, chills or hemoptysis; No Shortness of Breath  CARDIOVASCULAR: No chest pain, palpitations, passing out, dizziness, or leg swelling  GASTROINTESTINAL: No abdominal or epigastric pain. No nausea, vomiting, or hematemesis; No diarrhea or constipation. No melena or hematochezia.  GENITOURINARY: No dysuria, frequency, hematuria, or incontinence  NEUROLOGICAL: No headaches, memory loss, loss of strength, numbness, or tremors  SKIN: No itching, burning, rashes, or lesions   LYMPH Nodes: No enlarged glands  ENDOCRINE: No heat or cold intolerance; No hair loss  MUSCULOSKELETAL: No joint pain or swelling; No muscle, back, or extremity pain  PSYCHIATRIC: No depression, anxiety, mood swings, or difficulty sleeping  HEME/LYMPH: No easy bruising, or bleeding gums  ALLERY AND IMMUNOLOGIC: No hives or eczema	    Vital Signs Last 24 Hrs  T(C): 36.7 (2019 11:14), Max: 36.8 (2019 23:10)  T(F): 98 (2019 11:14), Max: 98.2 (2019 23:10)  HR: 56 (2019 11:14) (49 - 61)  BP: 154/64 (2019 11:14) (120/66 - 216/81)  BP(mean): 84 (2019 01:36) (84 - 84)  RR: 16 (2019 11:14) (16 - 20)  SpO2: 99% (2019 11:14) (95% - 100%)      OUT:    Voided: 300 mL  Total OUT: 300 mL    Total NET: -300 mL      PHYSICAL EXAM:  Appearance: Normal, well nourished	  HEENT: Normal oral mucosa, PERRL, EOMI, sclera non-icteric	  Lymphatic: No cervical lymphadenopathy  Cardiovascular: Normal S1 S2, No JVD, 3/6 MISSY LSB, No carotid bruits, No peripheral edema  Respiratory: Lungs clear to auscultation	  Psychiatry: A & O x 3, Mood & affect appropriate  Gastrointestinal:  Soft, Non-tender, + BS, no bruits	  Skin: No rashes, No ecchymoses, No cyanosis  Neurologic: Grossly non-focal with full strength in all four extremities  Extremities: Normal range of motion, No clubbing, cyanosis or edema  Vascular: Peripheral pulses palpable 2+ bilaterally      INTERPRETATION OF TELEMETRY: SR/SB low 49  ECG: SB, NSST-T abnl     LABS:                        11.8   7.12  )-----------( 232      ( 2019 11:05 )             34.3     07-20    141  |  106  |  27.0<H>  ----------------------------<  148<H>  4.6   |  24.0  |  1.06    Ca    9.8      2019 11:05  Mg     1.8         TPro  6.6  /  Alb  4.0  /  TBili  0.4  /  DBili  x   /  AST  17  /  ALT  12  /  AlkPhos  75      CARDIAC MARKERS ( 2019 11:05 )  x     / <0.01 ng/mL / x     / x     / x          PT/INR - ( 2019 11:05 )   PT: 11.0 sec;   INR: 0.96 ratio    PTT - ( 2019 11:05 )  PTT:30.9 sec    Urinalysis Basic - ( 2019 14:21 )    Color: Yellow / Appearance: Clear / S.010 / pH: x  Gluc: x / Ketone: Negative  / Bili: Negative / Urobili: Negative mg/dL   Blood: x / Protein: 15 mg/dL / Nitrite: Negative   Leuk Esterase: Negative / RBC: 0-2 /HPF / WBC 0-2   Sq Epi: x / Non Sq Epi: Occasional / Bacteria: Occasional      I&O's Summary    2019 07:01  -  2019 07:00  --------------------------------------------------------  IN: 0 mL / OUT: 300 mL / NET: -300 mL      BNP  Serum Pro-Brain Natriuretic Peptide: 782 pg/mL (19 @ 11:05)    RADIOLOGY & ADDITIONAL STUDIES:  < from: CT Angio Head w/ IV Cont (19 @ 12:25) >  IMPRESSION:    No acute findings. Bilateral ICA stenosis as above      < end of copied text >    < from: US Duplex Carotid Arteries Complete, Bilateral (19 @ 12:07) >  Findings: Grayscale, color and spectral Doppler examination of both   carotid arteries was performed.     Correlation made with recent CT 2019. Reported prior studies not   available for comparison.    Grayscale imaging reveals moderate calcified plaque mid through distal   common carotid artery, carotid bulb and proximal internal carotid artery   bilaterally.    Blood flow velocities are as follows:    RIGHT:    PROX CCA = 77 ;  DIST CCA = 96 ;  PROX ICA = 85 ;  DIST ICA =   106 ;  ECA = 421    ICA/CCA ratio 1.1    LEFT   :    PROX CCA = 84 ;  DIST CCA = 98 ;  PROX ICA = 132 ;  DIST ICA   = 139 ;  ECA = 552    ICA/CCA ratio 1.4    Impression: 52 to 69 % stenosis left internal carotid artery by peak   systolic velocity criteria.      < end of copied text >

## 2019-07-21 NOTE — ED CDU PROVIDER DISPOSITION NOTE - ATTENDING CONTRIBUTION TO CARE
I, Ozzie Ribeiro, performed the initial face to face bedside interview with this patient regarding history of present illness, review of symptoms and relevant past medical, social and family history.  I completed an independent physical examination.  I was the initial provider who evaluated this patient. I have signed out the follow up of any pending tests (i.e. labs, radiological studies) to the ACP.  I have communicated the patient’s plan of care and disposition with the ACP.

## 2019-07-21 NOTE — H&P ADULT - NSHPPHYSICALEXAM_GEN_ALL_CORE
Vital Signs Last 24 Hrs  T(C): 36.7 (21 Jul 2019 11:14), Max: 36.8 (20 Jul 2019 23:10)  T(F): 98 (21 Jul 2019 11:14), Max: 98.2 (20 Jul 2019 23:10)  HR: 56 (21 Jul 2019 11:14) (49 - 61)  BP: 154/64 (21 Jul 2019 11:14) (120/66 - 201/76)  BP(mean): 84 (21 Jul 2019 01:36) (84 - 84)  RR: 16 (21 Jul 2019 11:14) (16 - 20)  SpO2: 99% (21 Jul 2019 11:14) (98% - 100%)    General appearance: No acute distress, Awake, Alert  HEENT: Normocephalic, Atraumatic, Conjunctiva clear, EOMI  Neck: Supple, No JVD, No tenderness  Lungs: Breath sound equal bilaterally, No wheezes, No rales  Cardiovascular: S1S2, Regular rhythm, Systolic murmur  Abdomen: Soft, Nontender, Nondistended, No guarding/rebound, Positive bowel sounds  Extremities: No clubbing, No cyanosis, No edema, No calf tenderness  Neuro: Strength equal bilaterally, No tremors  Psychiatric: Appropriate mood, Normal affect

## 2019-07-21 NOTE — ED CDU PROVIDER DISPOSITION NOTE - CLINICAL COURSE
pt is admitted in obs unit due to lucas cardia and dizziness, pt is still pending MRI and remains sinus bradycardia . seen the pt by dr card recommended  to admitted the pt to pcp

## 2019-07-21 NOTE — H&P ADULT - HISTORY OF PRESENT ILLNESS
83M presented with complaints of persistent dizziness. The patient reported that he has had intermittent episodes of dizziness which is mostly when he gets up from bed. The symptoms usually resolves with time and the patient is able to continue with his daily activities. Most recently he has had persistent dizziness which did not resolve. The patient notes that the dose of metoprolol was increased about one months prior which was about the time that the symptoms began. He denied any associated chest pain or palpitations. He has had no episodes of syncope or falls. The patient is unaware of any aggravating or relieving factors. He denied any orthopnea, lower extremity edema, or dyspnea on exertion. In the emergency department, the patient was noted have bradycardia as well as orthostatic changes on examination. The patient was placed in observation overnight but had persistent symptoms and thought to warrant admission to the hospital.    Upon further discussion with the patient's wife, the patient had been noting that the dizziness had been taking longer to resolve with each episode over the past month.

## 2019-07-21 NOTE — CHART NOTE - NSCHARTNOTEFT_GEN_A_CORE
Carotid duplex US was reviewed with Dr. Harris which re-demonstrated moderate stenosis that is stable from previous accounts. Vertebral arteries show antegrade flow. Patient is okay for discharge from a vascular surgery perspective and can follow up as an outpatient with vascular surgeon at home in [place]

## 2019-07-21 NOTE — H&P ADULT - NSICDXPASTMEDICALHX_GEN_ALL_CORE_FT
PAST MEDICAL HISTORY:  CAD (coronary artery disease)     DM2 (diabetes mellitus, type 2)     HTN (hypertension)

## 2019-07-21 NOTE — CONSULT NOTE ADULT - ASSESSMENT
TIA likely from low BP and or bradycardia from beta blocker superimposed on vascular disease. Doubt stroke.

## 2019-07-21 NOTE — H&P ADULT - NSHPLABSRESULTS_GEN_ALL_CORE
Chest Xray was reviewed, no acute pulmonary disease noted    CT of the head was reviewed, no acute intracranial pathology noted

## 2019-07-22 ENCOUNTER — TRANSCRIPTION ENCOUNTER (OUTPATIENT)
Age: 84
End: 2019-07-22

## 2019-07-22 DIAGNOSIS — I35.0 NONRHEUMATIC AORTIC (VALVE) STENOSIS: ICD-10-CM

## 2019-07-22 LAB
GLUCOSE BLDC GLUCOMTR-MCNC: 117 MG/DL — HIGH (ref 70–99)
GLUCOSE BLDC GLUCOMTR-MCNC: 122 MG/DL — HIGH (ref 70–99)
GLUCOSE BLDC GLUCOMTR-MCNC: 130 MG/DL — HIGH (ref 70–99)
GLUCOSE BLDC GLUCOMTR-MCNC: 233 MG/DL — HIGH (ref 70–99)
HBA1C BLD-MCNC: 6.3 % — HIGH (ref 4–5.6)

## 2019-07-22 PROCEDURE — 93306 TTE W/DOPPLER COMPLETE: CPT | Mod: 26

## 2019-07-22 PROCEDURE — 99223 1ST HOSP IP/OBS HIGH 75: CPT | Mod: 57

## 2019-07-22 PROCEDURE — 99233 SBSQ HOSP IP/OBS HIGH 50: CPT

## 2019-07-22 PROCEDURE — 99232 SBSQ HOSP IP/OBS MODERATE 35: CPT

## 2019-07-22 PROCEDURE — 93459 L HRT ART/GRFT ANGIO: CPT | Mod: 26

## 2019-07-22 PROCEDURE — 70450 CT HEAD/BRAIN W/O DYE: CPT | Mod: 26

## 2019-07-22 PROCEDURE — 99152 MOD SED SAME PHYS/QHP 5/>YRS: CPT

## 2019-07-22 RX ORDER — HYDRALAZINE HCL 50 MG
10 TABLET ORAL ONCE
Refills: 0 | Status: COMPLETED | OUTPATIENT
Start: 2019-07-22 | End: 2019-07-22

## 2019-07-22 RX ADMIN — Medication 25 MILLIGRAM(S): at 05:41

## 2019-07-22 RX ADMIN — PANTOPRAZOLE SODIUM 40 MILLIGRAM(S): 20 TABLET, DELAYED RELEASE ORAL at 05:41

## 2019-07-22 RX ADMIN — BRIMONIDINE TARTRATE 1 DROP(S): 2 SOLUTION/ DROPS OPHTHALMIC at 05:42

## 2019-07-22 RX ADMIN — AMLODIPINE BESYLATE 5 MILLIGRAM(S): 2.5 TABLET ORAL at 05:41

## 2019-07-22 RX ADMIN — Medication 81 MILLIGRAM(S): at 12:34

## 2019-07-22 RX ADMIN — CLOPIDOGREL BISULFATE 75 MILLIGRAM(S): 75 TABLET, FILM COATED ORAL at 12:34

## 2019-07-22 RX ADMIN — ATORVASTATIN CALCIUM 10 MILLIGRAM(S): 80 TABLET, FILM COATED ORAL at 21:25

## 2019-07-22 RX ADMIN — Medication 1 DROP(S): at 20:38

## 2019-07-22 RX ADMIN — Medication 1 DROP(S): at 05:59

## 2019-07-22 RX ADMIN — BRIMONIDINE TARTRATE 1 DROP(S): 2 SOLUTION/ DROPS OPHTHALMIC at 20:38

## 2019-07-22 RX ADMIN — Medication 10 MILLIGRAM(S): at 19:42

## 2019-07-22 RX ADMIN — Medication 10 MILLIGRAM(S): at 21:07

## 2019-07-22 RX ADMIN — ENOXAPARIN SODIUM 40 MILLIGRAM(S): 100 INJECTION SUBCUTANEOUS at 12:34

## 2019-07-22 NOTE — PROGRESS NOTE ADULT - ASSESSMENT
83M with recurrent episodes of dizziness worsening over the past month found to have bradycardia and aortic stenosis.    Dizziness - Noted to have orthostatic changes on examination. Status post intravenous fluid resuscitation. Cardiology and Neurology consultation noted. MRI pending.    Aortic stenosis - Discussed with Cardiology, cath and TAVR being discussed with patient     Bradycardia - Sinus bradycardia on EKG. Metoprolol decreased.    Hypertension - Close blood pressure monitoring. Amlodipine to be initiated.    Carotid stenosis - CT angiogram and carotid doppler results noted. Vascular Surgery consultation noted. No intervention recommended at this time.    CAD - On aspirin and clopidogrel. No evidence of acute ischemia.    Diabetes - Insulin coverage, close monitoring of blood glucose levels.

## 2019-07-22 NOTE — PROGRESS NOTE ADULT - ASSESSMENT
83M presented with complaints of persistent dizziness. The patient reported that he has had intermittent episodes of dizziness which is mostly when he gets up from bed. The symptoms usually resolves with time and the patient is able to continue with his daily activities. Most recently he has had persistent dizziness which did not resolve. The patient notes that the dose of metoprolol was increased about one months prior which was about the time that the symptoms began. He denied any associated chest pain or palpitations. He has had no episodes of syncope or falls. The patient is unaware of any aggravating or relieving factors. He denied any orthopnea, lower extremity edema, or dyspnea on exertion. In the emergency department, the patient was noted have bradycardia as well as orthostatic changes on examination. The patient was placed in observation overnight but had persistent symptoms and thought to warrant admission to the hospital. Upon further discussion with the patient's wife, the patient had been noting that the dizziness had been taking longer to resolve with each episode over the past month. Patient underwent echo which showed severe aortic stenosis and previous had a cath in 2017 which did not have severe AS. Patient on physical exam does have a systolic murmur II/VI RSB but patient claims to have had this murmur since childhood. Patient will undergo C to evaluate aortic valve with Len FULTON.     Indication for Cath - Symptomatic dizziness, Evaluation of severe aortic stenosis   EF 55-60%  ASA 3  Mallampati 2  GFR 65  PCI BR 2.0%

## 2019-07-22 NOTE — CONSULT NOTE ADULT - ASSESSMENT
82 y/o male with PMH of CAD s/p prior CABG (patent LIMA to LAD and SVG to CX with occluded SVG to RCA )and PCI to mid RCA in 2017 presenting with symptomatic low flow, low gradient severe AS   Indicated for pre AVR coronary evaluation   will cross the valve to obtain P2P gradient as well.

## 2019-07-22 NOTE — PROGRESS NOTE ADULT - SUBJECTIVE AND OBJECTIVE BOX
CC:  Patient is a 83y old  Male who presents with a chief complaint of near syncope (2019 11:20)    HPI:  83M presented with complaints of persistent dizziness. The patient reported that he has had intermittent episodes of dizziness which is mostly when he gets up from bed. The symptoms usually resolves with time and the patient is able to continue with his daily activities. Most recently he has had persistent dizziness which did not resolve. The patient notes that the dose of metoprolol was increased about one months prior which was about the time that the symptoms began. He denied any associated chest pain or palpitations. He has had no episodes of syncope or falls. The patient is unaware of any aggravating or relieving factors. He denied any orthopnea, lower extremity edema, or dyspnea on exertion. In the emergency department, the patient was noted have bradycardia as well as orthostatic changes on examination. The patient was placed in observation overnight but had persistent symptoms and thought to warrant admission to the hospital.    Upon further discussion with the patient's wife, the patient had been noting that the dizziness had been taking longer to resolve with each episode over the past month. (2019 16:08)      ROS: All review of systems negative unless indicated otherwise below.     Lab Results:  Complete Blood Count (19 @ 11:05)    WBC Count: 7.12 K/uL    RBC Count: 3.55 M/uL    Hemoglobin: 11.8 g/dL    Hematocrit: 34.3 %    Mean Cell Volume: 96.6 fl    Mean Cell Hemoglobin: 33.2 pg    Mean Cell Hemoglobin Conc: 34.4 gm/dL    Red Cell Distrib Width: 12.3 %    Platelet Count - Automated: 232 K/uL    Comprehensive Metabolic Panel (19 @ 11:05)    Sodium, Serum: 141 mmol/L    Potassium, Serum: 4.6 mmol/L    Chloride, Serum: 106 mmol/L    Carbon Dioxide, Serum: 24.0 mmol/L    Anion Gap, Serum: 11 mmol/L    Blood Urea Nitrogen, Serum: 27.0 mg/dL    Creatinine, Serum: 1.06 mg/dL    Glucose, Serum: 148 mg/dL    Calcium, Total Serum: 9.8 mg/dL    Protein Total, Serum: 6.6 g/dL    Albumin, Serum: 4.0 g/dL    Bilirubin Total, Serum: 0.4 mg/dL    Alkaline Phosphatase, Serum: 75 U/L    Aspartate Aminotransferase (AST/SGOT): 17 U/L    Alanine Aminotransferase (ALT/SGPT): 12 U/L    eGFR if Non : 65: Interpretative comment  The units for eGFR are mL/min/1.73M2 (normalized body surface area). The  eGFR is calculated from a serum creatinine using the CKD-EPI equation.  Other variables required for calculation are race, age and sex. Among  patients with chronic kidney disease (CKD), the eGFR is useful in  determining the stage of disease according to KDOQI CKD classification.  All eGFR results are reported numerically with the following  interpretation.          GFR                    With                 Without     (ml/min/1.73 m2)    Kidney Damage       Kidney Damage        >= 90                    Stage 1                     Normal        60-89                    Stage 2                     Decreased GFR        30-59     Stage 3                     Stage 3        15-29                    Stage 4                     Stage 4        < 15                      Stage 5                     Stage 5  Each stage of CKD assumes that the associated GFR level has been in  effect for at least 3 months. Determination of stages one and two (with  eGFR > 59 ml/min/m2) requires estimation of kidney damage for at least 3  months as defined by structural or functional abnormalities.  Limitations: All estimates of GFR will be less accurate for patients at  extremes of muscle mass (including but not limited to frail elderly,  critically ill, or cancer patients), those with unusual diets, and those  with conditions associated with reduced secretion or extrarenal  elimination of creatinine. The eGFR equation is not recommended for use  in patients with unstable creatinine levels. mL/min/1.73M2    eGFR if African American: 76 mL/min/1.73M2    Prothrombin Time and INR, Plasma (19 @ 11:05)    Prothrombin Time, Plasma: 11.0: Effective 2018 the reference range for PT has changed. sec    INR: 0.96: RECOMMENDED RANGES FOR THERAPEUTIC INR:    2.0-3.0 for most medical and surgical thromboembolic states    2.0-3.0 for atrial fibrillation    2.0-3.0 for bileaflet mechanical valve in aortic position    2.5-3.5 for mechanical heart valves   Chest 2004;[id number]  The presence of direct thrombin inhibitors (argatroban, refludan)  may falsely increase results. ratio    Activated Partial Thromboplastin Time (19 @ 11:05)    Activated Partial Thromboplastin Time: 30.9: The recommended therapeutic heparin range (full dose) is 58-99 seconds.  Recommended therapeutic Argatroban range is 1.5 to 3.0 times the baseline  APTT value, not to exceed 100 seconds. Recommended therapeutic Refludan  range is 1.5 to 2.5 times thebaseline APTT.  Effective 2018 the reference range has changed. sec    ECHO  < from: TTE Echo Complete w/Doppler (19 @ 08:29) >  2D AND M-MODE MEASUREMENTS (normal ranges within parentheses):  Left                 Normal   Aorta/Left            Normal  Ventricle:                 Atrium:  IVSd (2D):    1.14  (0.7-1.1) Left Atrium  4.12 cm (1.9-4.0)                 cm             (2D):  LVPWd (2D):   1.20  (0.7-1.1) LA Volume     27.5                 cm             Index         ml/m²  LVIDd (2D):   4.71  (3.4-5.7)                 cm  LVIDs (2D):   2.89                 cm  LV FS (2D):   38.6   (>25%)                  %  Relative Wall 0.51   (<0.42)  Thickness    LV SYSTOLIC FUNCTION BY 2D PLANIMETRY (MOD):  EF-Biplane: 48 %    LV DIASTOLIC FUNCTION:  MV Peak E: 0.50 m/s  MV Peak A: 0.78 m/s  E/A Ratio: 0.64    SPECTRAL DOPPLER ANALYSIS (where applicable):  Aortic Valve: AoV Max Edin: 3.11 m/s AoV Peak P.6 mmHg AoV Mean P.4 mmHg    LVOT Vmax: 0.64 m/s LVOT VTI: 0.176 m LVOT Diameter: 2.15 cm    AoV Area, Vmax: 0.74 cm² AoV Area, VTI: 0.71 cm² AoV Area, Vmn: 0.65 cm²  Ao VTI: 0.897  Tricuspid Valve and PA/RV Systolic Pressure: TR Max Velocity: 2.89 m/s RA   Pressure: 3 mmHg RVSP/PASP: 36.4 mmHg       PHYSICIAN INTERPRETATION:  Left Ventricle: Endocardial visualization was enhanced with intravenous   echo contrast. The left ventricular internal cavity size is normal.  Global LV systolic function was normal. Left ventricular ejection   fraction, by visual estimation, is 55 to 60%. Spectral Doppler shows   impaired relaxation pattern of left ventricular myocardial filling (Grade   I diastolic dysfunction).  Right Ventricle: Normal right ventricular size and function. The right   ventricular size is normal. RV systolic function is normal.  Left Atrium: Normal left atrial size.  Right Atrium: Normal right atrial size.  Pericardium: There is no evidence of pericardial effusion.  Mitral Valve: Mild thickening of the anterior and posterior mitral valve   leaflets. Mild mitral valve regurgitation is seen.  Tricuspid Valve: Mild tricuspid regurgitation is visualized. Estimated   pulmonary artery systolic pressure is 36.4 mmHg assuming a right atrial   pressure of 3 mmHg, which is consistent with borderline pulmonary   hypertension.  Aortic Valve: Sclerotic aortic valve with decreased opening. Peak   transaortic gradient equals 38.6 mmHg, mean transaortic gradient equals   25.4 mmHg, the calculated aortic valve area equals 0.71 cm² by the   continuity equation consistent with severe aortic stenosis. The peak   aortic velocity was obtained from the apical view. No evidence of aortic   valve regurgitation is seen. Low velocity, low gradient aortic stenosis.   The Dimesionless Index value is 0.2.  Pulmonic Valve: The pulmonic valve was not well visualized.  Aorta: The aorta was not well visualized.  Pulmonary Artery: The pulmonary artery is not well seen.  Venous: The inferior vena cava was normal sized, with respiratory size   variation greater than 50%.    < end of copied text >  --------------------------------------------------------------------------------------  PREVIOUS CATH REPORT - TRANSCRIBED (paraphrased): Official Report on paper chart  2/15/17 Right and Left Heart Cath  RFA - Angioseal     Hemodynamics:   1. Right atrial A-wave 6, v-wave 7, mean of 4, RVSP 30, RVDP 7  2. PASP 27, PADP 9  3. PCWP 12  4. CO 4.5  5. EDP 12  6 Aortic valve - 10mm gradient noted    Conclusions:   1. pLMCA 100% occluded   2. oRCA 80% - 2.75 x 16mm Synergy TAMMY intervention  3. SVG to dRCA is totally occluded  4. SVG to dLCX is patent   5. LIMA-mLAD has mild disease  6. Normal Left vent systolic function.     Physicial Plan:   "The patient does not appear to have any significant aortic stenosis and this can be followed with an echocardiogram periodically"     <end of paraphrased transcription of 2/15/19 Cath report from Gus Gutierrez MD in [place]>  ---------------------------------------------------------------------  MEDICATIONS  (STANDING):  amLODIPine   Tablet 5 milliGRAM(s) Oral daily  aspirin enteric coated 81 milliGRAM(s) Oral daily  atorvastatin 10 milliGRAM(s) Oral at bedtime  brimonidine 0.2% Ophthalmic Solution 1 Drop(s) Both EYES two times a day  clopidogrel Tablet 75 milliGRAM(s) Oral daily  dextrose 5%. 1000 milliLiter(s) IV Continuous <Continuous>  dextrose 50% Injectable 12.5 Gram(s) IV Push once  dextrose 50% Injectable 25 Gram(s) IV Push once  dextrose 50% Injectable 25 Gram(s) IV Push once  enoxaparin Injectable 40 milliGRAM(s) SubCutaneous daily  insulin lispro (HumaLOG) corrective regimen sliding scale   SubCutaneous three times a day before meals  pantoprazole    Tablet 40 milliGRAM(s) Oral before breakfast  timolol 0.5% Solution 1 Drop(s) Both EYES two times a day    MEDICATIONS  (PRN):  dextrose 40% Gel 15 Gram(s) Oral once PRN Blood Glucose LESS THAN 70 milliGRAM(s)/deciliter  glucagon  Injectable 1 milliGRAM(s) IntraMuscular once PRN Glucose LESS THAN 70 milligrams/deciliter  hydrALAZINE Injectable 10 milliGRAM(s) IV Push every 6 hours PRN SBP > 160    PHYSICAL EXAM:  Vital Signs Last 24 Hrs  T(C): 36.5 (2019 14:29), Max: 36.6 (2019 23:54)  T(F): 97.7 (2019 14:29), Max: 97.9 (2019 23:54)  HR: 51 (2019 14:29) (48 - 70)  BP: 180/72 (2019 14:29) (139/65 - 180/72)  BP(mean): 99 (2019 12:24) (91 - 99)  RR: 18 (2019 14:29) (16 - 18)  SpO2: 100% (2019 14:29) (96% - 100%)  GENERAL: NAD, well-groomed, well-developed  HEAD:  Atraumatic, Normocephalic  NECK: Supple, No JVD  NERVOUS SYSTEM:  Alert & Oriented X3, Good concentration; Motor Strength 5/5 B/L upper and lower extremities, sensation intact  CHEST/LUNG: Clear to auscultation bilaterally, No rales, rhonchi, wheezing, or rubs, grade II/VI RSB systolic crescendo-decrescendo murmur.   HEART: Regular rate and rhythm; s1 and s2 auscultated, No murmurs, rubs, or gallops  ABDOMEN: Soft, Nontender, Nondistended; Bowel sounds present and normoactive.   EXTREMITIES:  2+ Peripheral Pulses, No clubbing, cyanosis, or edema  SKIN: No rashes or lesions

## 2019-07-22 NOTE — PROGRESS NOTE ADULT - SUBJECTIVE AND OBJECTIVE BOX
VALDEMAR HODGE Patient is a 83y old  Male who presents with a chief complaint of near syncope (22 Jul 2019 11:20)     HPI:  83M presented with complaints of persistent dizziness. The patient reported that he has had intermittent episodes of dizziness which is mostly when he gets up from bed. The symptoms usually resolves with time and the patient is able to continue with his daily activities. Most recently he has had persistent dizziness which did not resolve. The patient notes that the dose of metoprolol was increased about one months prior which was about the time that the symptoms began. He denied any associated chest pain or palpitations. He has had no episodes of syncope or falls. The patient is unaware of any aggravating or relieving factors. He denied any orthopnea, lower extremity edema, or dyspnea on exertion. In the emergency department, the patient was noted have bradycardia as well as orthostatic changes on examination. The patient was placed in observation overnight but had persistent symptoms and thought to warrant admission to the hospital.    Upon further discussion with the patient's wife, the patient had been noting that the dizziness had been taking longer to resolve with each episode over the past month. (21 Jul 2019 16:08)    The patient was seen and evaluated improved dizziness as long as he is sitting in chair - no new complaints   The patient is in no acute distress.      I&O's Summary    Allergies    No Known Allergies    Intolerances      HEALTH ISSUES - PROBLEM Dx:  Severe aortic stenosis by prior echocardiogram: Severe aortic stenosis by prior echocardiogram  Transient ischemic attack (TIA): Transient ischemic attack (TIA)  Lightheadedness: Lightheadedness        PAST MEDICAL & SURGICAL HISTORY:  DM2 (diabetes mellitus, type 2)  CAD (coronary artery disease)  HTN (hypertension)  S/P CABG (coronary artery bypass graft)          Vital Signs Last 24 Hrs  T(C): 36.5 (22 Jul 2019 14:29), Max: 36.6 (21 Jul 2019 23:54)  T(F): 97.7 (22 Jul 2019 14:29), Max: 97.9 (21 Jul 2019 23:54)  HR: 52 (22 Jul 2019 17:05) (48 - 70)  BP: 190/79 (22 Jul 2019 17:05) (139/65 - 190/79)  BP(mean): 99 (22 Jul 2019 12:24) (91 - 99)  RR: 16 (22 Jul 2019 17:05) (16 - 18)  SpO2: 99% (22 Jul 2019 17:05) (96% - 100%)T(C): 36.5 (07-22-19 @ 14:29), Max: 36.6 (07-21-19 @ 23:54)  HR: 52 (07-22-19 @ 17:05) (48 - 70)  BP: 190/79 (07-22-19 @ 17:05) (139/65 - 190/79)  RR: 16 (07-22-19 @ 17:05) (16 - 18)  SpO2: 99% (07-22-19 @ 17:05) (96% - 100%)  Wt(kg): --    PHYSICAL EXAM:    GENERAL: NAD, well-groomed, well-developed  HEAD:  Atraumatic, Normocephalic  EYES: EOMI, PERRL  NERVOUS SYSTEM:  Alert & Oriented X3,  Moves upper and lower extremities; CNS-II-XII  CHEST/LUNG: Clear to auscultation bilaterally; No rales, rhonchi, wheezing,   HEART: Regular rate and rhythm; No murmurs,   ABDOMEN: Soft, Nontender, Nondistended; Bowel sounds present  EXTREMITIES:  Peripheral Pulses, No  cyanosis, or edema  SKIN: No rashes or lesions  psychiatry- mood and affect approprite, Insight and judgement intact     amLODIPine   Tablet 5 milliGRAM(s) Oral daily  aspirin enteric coated 81 milliGRAM(s) Oral daily  atorvastatin 10 milliGRAM(s) Oral at bedtime  brimonidine 0.2% Ophthalmic Solution 1 Drop(s) Both EYES two times a day  clopidogrel Tablet 75 milliGRAM(s) Oral daily  dextrose 40% Gel 15 Gram(s) Oral once PRN  dextrose 5%. 1000 milliLiter(s) IV Continuous <Continuous>  dextrose 50% Injectable 12.5 Gram(s) IV Push once  dextrose 50% Injectable 25 Gram(s) IV Push once  dextrose 50% Injectable 25 Gram(s) IV Push once  enoxaparin Injectable 40 milliGRAM(s) SubCutaneous daily  glucagon  Injectable 1 milliGRAM(s) IntraMuscular once PRN  hydrALAZINE Injectable 10 milliGRAM(s) IV Push every 6 hours PRN  insulin lispro (HumaLOG) corrective regimen sliding scale   SubCutaneous three times a day before meals  pantoprazole    Tablet 40 milliGRAM(s) Oral before breakfast  timolol 0.5% Solution 1 Drop(s) Both EYES two times a day      LABS:                      CAPILLARY BLOOD GLUCOSE      POCT Blood Glucose.: 130 mg/dL (22 Jul 2019 12:20)  POCT Blood Glucose.: 117 mg/dL (22 Jul 2019 09:48)  POCT Blood Glucose.: 127 mg/dL (21 Jul 2019 21:25)      RADIOLOGY & ADDITIONAL TESTS:      Consultant notes reviewed    Case discussed with consultant/provider/ family /patient

## 2019-07-22 NOTE — PROGRESS NOTE ADULT - SUBJECTIVE AND OBJECTIVE BOX
CHIEF COMPLAINT:Patient is a 83y old  Male who presents with a chief complaint of TIA? (21 Jul 2019 10:01)    INTERVAL HISTORY:  pt seen and examined.  episode of cp and sob with exertion as per wife.  Pt with severe AS on exam and TTE.  currently CP free    Allergies  No Known Allergies  	  MEDICATIONS:  amLODIPine   Tablet 5 milliGRAM(s) Oral daily  hydrALAZINE Injectable 10 milliGRAM(s) IV Push every 6 hours PRN  metoprolol succinate ER 25 milliGRAM(s) Oral daily  pantoprazole    Tablet 40 milliGRAM(s) Oral before breakfast  atorvastatin 10 milliGRAM(s) Oral at bedtime  dextrose 40% Gel 15 Gram(s) Oral once PRN  dextrose 50% Injectable 12.5 Gram(s) IV Push once  dextrose 50% Injectable 25 Gram(s) IV Push once  dextrose 50% Injectable 25 Gram(s) IV Push once  glucagon  Injectable 1 milliGRAM(s) IntraMuscular once PRN  insulin lispro (HumaLOG) corrective regimen sliding scale   SubCutaneous three times a day before meals  aspirin enteric coated 81 milliGRAM(s) Oral daily  brimonidine 0.2% Ophthalmic Solution 1 Drop(s) Both EYES two times a day  clopidogrel Tablet 75 milliGRAM(s) Oral daily  dextrose 5%. 1000 milliLiter(s) IV Continuous <Continuous>  enoxaparin Injectable 40 milliGRAM(s) SubCutaneous daily  timolol 0.5% Solution 1 Drop(s) Both EYES two times a day    PHYSICAL EXAM:  T(C): 36.6 (07-22-19 @ 07:29), Max: 36.7 (07-21-19 @ 11:14)  HR: 49 (07-22-19 @ 07:29) (49 - 70)  BP: 157/58 (07-22-19 @ 07:29) (139/65 - 167/68)  RR: 18 (07-22-19 @ 07:29) (16 - 18)  SpO2: 96% (07-22-19 @ 07:29) (96% - 100%)    Appearance: Normal	  HEENT:   NC/AT  Eye: Pink Conjunctiva  Lungs: CTA B/L  CVS: normal s1, no s2. 3/6 sm lsb.  Pulses: Normal distal pulses.  Neuro: A&O x3    TELEMETRY: sinus lucas    LABS:                    11.8   7.12  )-----------( 232      ( 20 Jul 2019 11:05 )             34.3     07-20    141  |  106  |  27.0<H>  ----------------------------<  148<H>  4.6   |  24.0  |  1.06    Ca    9.8      20 Jul 2019 11:05  Mg     1.8     07-20    TPro  6.6  /  Alb  4.0  /  TBili  0.4  /  DBili  x   /  AST  17  /  ALT  12  /  AlkPhos  75  07-20  HgA1c: Hemoglobin A1C, Whole Blood: 6.3 % (07-22 @ 07:40)    ASSESSMENT/PLAN: 	  AS severe by exam and echo. Evaluation for TAVR  spoke with pt and family. [place] like [place]  plan for cath today  I left message for his cardiologist to call me back.

## 2019-07-22 NOTE — DISCHARGE NOTE NURSING/CASE MANAGEMENT/SOCIAL WORK - NSDCPEPTSTRK_GEN_ALL_CORE
Risk factors for stroke/Stroke support groups for patients, families, and friends/Stroke warning signs and symptoms/Signs and symptoms of stroke/Call 911 for stroke/Need for follow up after discharge/Stroke education booklet/Prescribed medications

## 2019-07-22 NOTE — CHART NOTE - NSCHARTNOTEFT_GEN_A_CORE
Patient is a 83y old  Male who presents with a chief complaint of dizziness (2019 17:42)      BRIEF HOSPITAL COURSE:   83M with PMHx CAD s/p prior CABG (3 grafts , LIMA to LAD, SVG to OM3 and SVG to RCA ) , history of PCI to mid RCA ( with patent LIMA to LAD and patent SVG to CX in 2017) who presented with complaints of intermittent dizziness. Found to have severe AS on echo. Underwent cardiac cath today which revealed patent bypass vessels, EF 55%, peak aortic valve gradient 27. Uncomplicated procedure.      Events last 24 hours: Called by nursing for pt new onset expressive aphasia. Time noted 22:32. CODE stroke called. Pt noted to receive IV hydralazine prior with drop in SBP from 170systolic to 105 systolic at time symptoms noted.     PAST MEDICAL & SURGICAL HISTORY:  DM2 (diabetes mellitus, type 2)  CAD (coronary artery disease)  HTN (hypertension)  S/P CABG (coronary artery bypass graft)      Review of Systems:  CONSTITUTIONAL: No fever, chills, or fatigue  EYES: No eye pain, visual disturbances, or discharge  ENMT:  No difficulty hearing, tinnitus, vertigo; No sinus or throat pain  NECK: No pain or stiffness  RESPIRATORY: No cough, wheezing, chills or hemoptysis; No shortness of breath  CARDIOVASCULAR: No chest pain, palpitations, dizziness, or leg swelling  GASTROINTESTINAL: No abdominal or epigastric pain. No nausea, vomiting, or hematemesis; No diarrhea or constipation. No melena or hematochezia.  GENITOURINARY: No dysuria, frequency, hematuria, or incontinence  NEUROLOGICAL: No headaches, memory loss, loss of strength, numbness, or tremors  SKIN: No itching, burning, rashes, or lesions   MUSCULOSKELETAL: No joint pain or swelling; No muscle, back, or extremity pain  PSYCHIATRIC: No depression, anxiety, mood swings, or difficulty sleeping      Medications:    amLODIPine   Tablet 5 milliGRAM(s) Oral daily  hydrALAZINE Injectable 10 milliGRAM(s) IV Push every 6 hours PRN          aspirin enteric coated 81 milliGRAM(s) Oral daily  clopidogrel Tablet 75 milliGRAM(s) Oral daily  enoxaparin Injectable 40 milliGRAM(s) SubCutaneous daily    pantoprazole    Tablet 40 milliGRAM(s) Oral before breakfast      atorvastatin 10 milliGRAM(s) Oral at bedtime  dextrose 40% Gel 15 Gram(s) Oral once PRN  dextrose 50% Injectable 12.5 Gram(s) IV Push once  dextrose 50% Injectable 25 Gram(s) IV Push once  dextrose 50% Injectable 25 Gram(s) IV Push once  glucagon  Injectable 1 milliGRAM(s) IntraMuscular once PRN  insulin lispro (HumaLOG) corrective regimen sliding scale   SubCutaneous three times a day before meals    dextrose 5%. 1000 milliLiter(s) IV Continuous <Continuous>      brimonidine 0.2% Ophthalmic Solution 1 Drop(s) Both EYES two times a day  timolol 0.5% Solution 1 Drop(s) Both EYES two times a day            ICU Vital Signs Last 24 Hrs  T(C): 36.4 (2019 21:26), Max: 36.6 (2019 23:54)  T(F): 97.5 (2019 21:26), Max: 97.9 (2019 23:54)  HR: 61 (2019 23:08) (48 - 70)  BP: 136/39 (2019 23:08) (108/57 - 193/79)  BP(mean): 60 (2019 23:08) (60 - 99)  ABP: --  ABP(mean): --  RR: 20 (2019 23:08) (16 - 20)  SpO2: 100% (2019 23:08) (95% - 100%)                LABS:                CAPILLARY BLOOD GLUCOSE  233 (2019 23:02)      POCT Blood Glucose.: 233 mg/dL (2019 22:40)            Physical Examination:    General: No acute distress.  Alert,  interactive, pt with noted difficulty finding words, NIH 1    HEENT: Pupils equal, reactive to light.  Symmetric.    PULM: Clear to auscultation bilaterally, no wheezing/rales/rhonchi    CVS: Regular rate and rhythm, + murmur    ABD: Soft, nondistended, nontender, normoactive bowel sounds, no rebound or guarding. Left groin cath site c/d/i, no hematoma or fluctuance.    EXT: No edema, nontender, cap refill <2 sec    SKIN: Warm and well perfused, no rashes noted.    RADIOLOGY:   [facility]  Department of Cardiology  [address] [place] [address]  [phone]  Cath Lab Report -- Comprehensive Report  Patient: VALDEMAR HODGE  Study date: 2019  Account number: [id number]  MR number: [id number]  : 1935  Gender: Male  Race: W  Case Physician(s):  Víctor Harrington MD  Referring Physician:  Kun Partida MD  INDICATIONS: Aortic valve stenosis. Possible ischemic symptoms ( dizziness  on exertion)  HISTORY: The patient has a history of coronary artery disease. The patient  has hypertension and oral hypoglycemic-treated diabetes. PRIOR  CARDIOVASCULAR PROCEDURES: Stent of the mid RCA. Coronary bypass.  PROCEDURE:  --  Left heart catheterization.  --  Left coronary angiography.  --  Right coronary angiography.  --  Bypass graft angiography.  --  Sonosite.  TECHNIQUE: The risks and alternatives of the procedures and conscious  sedation were explained to the patient and informed consent was obtained.  Cardiac catheterization performed electively.  Local anesthetic given. Right femoral artery access. Left heart  catheterization. Left coronary artery angiography. The vessel was injected  utilizing a catheter. Right coronary artery angiography. The vessel was  injected utilizing a catheter. Graft angiography. The vessel was injected  utilizing a catheter. Sonosite. RADIATION EXPOSURE: 8.3 min.  CONTRAST GIVEN: Omnipaque 40 ml.  MEDICATIONS GIVEN: Fentanyl, 50 mcg, IV. Midazolam, 0.5 mg, IV. 1%  Lidocaine, 10 ml, subcutaneously.  VENTRICLES: No LV gram was performed; however, a recent echocardiogram  demonstrated an EF of 55 %.  VALVES: AORTIC VALVE: The aortic valve was evaluated by hemodynamic  measurement, P2P gradient 27 mmHg. There was moderate aortic stenosis.  CORONARY VESSELS: The coronary circulation is right dominant.  LM:   --  Ostial LM: There was a 100 % stenosis.  LAD:   --  LAD: The artery was supplied by a patent bypass graft.  --  Mid LAD: Angiography showed no evidence of disease.  --  Distal LAD: The vessel was small to medium sized. Angiography showed no  evidence of disease.  CX:   --  Mid circumflex: The artery was supplied by retrograde flow from a  graft (OM3 graft) There was a diffuse 80 % stenosis.  --  OM3: The vessel was medium sized. The artery was supplied by a patent  bypass graft.  RCA:   --  Proximal RCA: There was a tubular 50 % stenosis.  --  Mid RCA: patent stent.  --  RPDA: The vessel was medium sized. Angiography showed mild  atherosclerosis with no flow limiting lesions.  GRAFTS:   --  Graft to the LAD: The graft was a large sized MASTERS. Graft  angiography showed no evidence of disease.  --  Graft to the 3rd obtuse marginal: The graft was a large sized saphenous  vein graft from the ascending aorta. Graft angiography showed no evidence  of disease.  COMPLICATIONS: No complications occurred during the cath lab visit.  DIAGNOSTIC IMPRESSIONS: Totally occluded LM  Patent mid RCA stent with moderate proximal RCA stenosis.  Patent MASTERS to LAD  Patent SVG to OM3 and retrogradely filling then whole CX system ( mid 80%  CX stenosis)  Known occluded SVG to RCA ( cath 2017)  Peak to peak gradient on pullback across the aortic valve is 27 mmHg (  likely reflecting moderate aortic stenosis )  Elevated left sided filling pressure ( LVEDP 20 mmHg)  DIAGNOSTIC RECOMMENDATIONS: continue medical therapy for CAD.  Evaluation for AVR ( surgical vs TAVR) based on echo results ( severe low  flow, low gradient aortic stenosis)  Prepared and signed by  Víctor Harrington MD    EXAM:  US DPLX CAROTIDS COMPL BI                          PROCEDURE DATE:  2019          INTERPRETATION:  History: Dizziness. History of carotid stenosis.    Findings: Grayscale, color and spectral Doppler examination of both   carotid arteries was performed.     Correlation made with recent CT 2019. Reported prior studies not   available for comparison.    Grayscale imaging reveals moderate calcified plaque mid through distal   common carotid artery, carotid bulb and proximal internal carotid artery   bilaterally.    Blood flow velocities are as follows:    RIGHT:    PROX CCA = 77 ;  DIST CCA = 96 ;  PROX ICA = 85 ;  DIST ICA =   106 ;  ECA = 421    ICA/CCA ratio 1.1    LEFT   :    PROX CCA = 84 ;  DIST CCA = 98 ;  PROX ICA = 132 ;  DIST ICA   = 139 ;  ECA = 552    ICA/CCA ratio 1.4    Impression: 52 to 69 % stenosis left internal carotid artery by peak   systolic velocity criteria.    EXAM:  XR CHEST PORTABLE IMMED 1V                          PROCEDURE DATE:  2019          INTERPRETATION:  Chest radiograph (one view)     CPT 27365    CLINICAL INFORMATION:  Patient is unable to communicate.  Chest pain.    TECHNIQUE:  Single frontal view of the chest was obtained.    FINDINGS:  No previous examinations are available for review.    The lungs are clear.  No pleural abnormality is seen.      The heart and mediastinum appear intact.  The patient is status post   median sternotomy and coronary artery bypass graft.      IMPRESSION: No evidence of active chest disease.   Status post CABG.       EXAM:  ECHO TRANSTHORACIC COMP W DOPP      PROCEDURE DATE:  2019   .      INTERPRETATION:  REPORT:    TRANSTHORACIC ECHOCARDIOGRAM REPORT         Patient Name:   VALDEMAR HODGE Patient Location: Inpatient  Medical Rec #:  [id number]            Accession #:      [id number]  Account #:                          Height:           70.9 in 180.0 cm  YOB: 1935          Weight:           164.9 lb 74.80 kg  Patient Age:    83 years            BSA:              1.94 m²  Patient Gender: M                   BP:               164/72 mmHg       Date of Exam:        2019 8:29:06 AM  Sonographer:         Karlos Cedeño  Referring Physician: Chantale Chadwick    Procedure:     2D Echo/Doppler/Color Doppler Complete.  Indications:   Syncope and collapse - R55  Diagnosis:     Syncope and collapse - R55  Study Details: Technically fair study.         2D AND M-MODE MEASUREMENTS (normal ranges within parentheses):  Left                 Normal   Aorta/Left            Normal  Ventricle:                    Atrium:  IVSd (2D):    1.14  (0.7-1.1) Left Atrium  4.12 cm (1.9-4.0)                 cm             (2D):  LVPWd (2D):   1.20  (0.7-1.1) LA Volume     27.5                 cm             Index         ml/m²  LVIDd (2D):   4.71  (3.4-5.7)                 cm  LVIDs (2D):   2.89                 cm  LV FS (2D):   38.6   (>25%)                  %  Relative Wall 0.51   (<0.42)  Thickness    LV SYSTOLIC FUNCTION BY 2D PLANIMETRY (MOD):  EF-Biplane: 48 %    LV DIASTOLIC FUNCTION:  MV Peak E: 0.50 m/s  MV Peak A: 0.78 m/s  E/A Ratio: 0.64    SPECTRAL DOPPLER ANALYSIS (where applicable):  Aortic Valve: AoV Max Edin: 3.11 m/s AoV Peak P.6 mmHg AoV Mean P.4 mmHg    LVOT Vmax: 0.64 m/s LVOT VTI: 0.176 m LVOT Diameter: 2.15 cm    AoV Area, Vmax: 0.74 cm² AoV Area, VTI: 0.71 cm² AoV Area, Vmn: 0.65 cm²  Ao VTI: 0.897  Tricuspid Valve and PA/RV Systolic Pressure: TR Max Velocity: 2.89 m/s RA   Pressure: 3 mmHg RVSP/PASP: 36.4 mmHg       PHYSICIAN INTERPRETATION:  Left Ventricle: Endocardial visualization was enhanced with intravenous   echo contrast. The left ventricular internal cavity size is normal.  Global LV systolic function was normal. Left ventricular ejection   fraction, by visual estimation, is 55 to 60%. Spectral Doppler shows   impaired relaxation pattern of left ventricular myocardial filling (Grade   I diastolic dysfunction).  Right Ventricle: Normal right ventricular size and function. The right   ventricular size is normal. RV systolic function is normal.  Left Atrium: Normal left atrial size.  Right Atrium: Normal right atrial size.  Pericardium: There is no evidence of pericardial effusion.  Mitral Valve: Mild thickening of the anterior and posterior mitral valve   leaflets. Mild mitral valve regurgitation is seen.  Tricuspid Valve: Mild tricuspid regurgitation is visualized. Estimated   pulmonary artery systolic pressure is 36.4 mmHg assuming a right atrial   pressure of 3 mmHg, which is consistent with borderline pulmonary   hypertension.  Aortic Valve: Sclerotic aortic valve with decreased opening. Peak   transaortic gradient equals 38.6 mmHg, mean transaortic gradient equals   25.4 mmHg, the calculated aortic valve area equals 0.71 cm² by the   continuity equation consistent with severe aortic stenosis. The peak   aortic velocity was obtained from the apical view. No evidence of aortic   valve regurgitation is seen. Low velocity, low gradient aortic stenosis.   The Dimesionless Index value is 0.2.  Pulmonic Valve: The pulmonic valve was not well visualized.  Aorta: The aorta was not well visualized.  Pulmonary Artery: The pulmonary artery is not well seen.  Venous: The inferior vena cava was normal sized, with respiratory size   variation greater than 50%.       Summary:   1. Technically fair study.   2. Endocardial visualization was enhanced with intravenous echo contrast.   3. Normal global left ventricular systolic function.   4. Left ventricular ejection fraction, by visual estimation, is 55 to   60%.   5. Spectral Doppler shows impaired relaxation pattern of left   ventricular myocardial filling (Grade I diastolic dysfunction).   6. There is mild left ventricular hypertrophy.   7. There is no evidence of pericardial effusion.   8. Mild mitral valve regurgitation.   9. Mild thickening of the anterior and posterior mitral valve leaflets.  10. Low velocity, low gradient aortic stenosis.  11. Sclerotic aortic valve with decreased opening.  12. Peak transaortic gradient equals 38.6 mmHg, mean transaortic gradient   equals 25.4 mmHg, the calculated aortic valve area equals 0.71 cm² by the   continuity equation consistent with severe aortic stenosis.  13. Estimated pulmonary artery systolic pressure is 36.4 mmHg assuming a   right atrial pressure of 3 mmHg, which is consistent with borderline   pulmonary hypertension.    Kun Partida MD Electronically signed on 2019 at 10:14:18 AM     EXAM:  CT BRAIN                          PROCEDURE DATE:  2019          INTERPRETATION:  CT HEAD WITHOUT CONTRAST    INDICATION: 83 years old. Male. exp aphasia. High blood pressure    COMPARISON: CTA head and neck 2019.    TECHNIQUE: Noncontrast axial CT head was obtained from the skull base to   vertex.    FINDINGS:  No acute intracranial hemorrhage, mass effect or midline shift.  No CT evidence of acute large territory vascular infarct.  The ventricles and cortical sulci are prominent reflecting parenchymal   volume loss.  Scattered hypodensities in the periventricular white matter are   nonspecific, but likely sequela of small vessel ischemic disease.  Intracranial atherosclerotic calcifications are present.    Mild mucosal thickening of the bilateral ethmoid air cells. The mastoid   air cells are well aerated. The native ocular lenses are surgically   absent. No displaced calvarial fracture.    IMPRESSION:  No acute intracranial hemorrhage or mass effect. Further evaluation with   MRI may be performed as clinically indicated.    Findings were discussed with Dr. Jones on 2019 11:01 PM with readback.    Impression:  1. Aortic stenosis -moderate to severe  2. CAD sp CABG with patent bypass grafts  3. TIA- resolution of symptoms now with NIH 0    Plan:  1. discussed with hospitalist and Neuro (Stephania) no intervention needed, ? whether this was related to relative hypotension given his prior SBP 170s.  2. MRI when stable  3. cont DAPT  4. cont statin  5. transfer to 4 [facility]  6. neuro checks q 2hours   7. evaluation of AS needs (surgery vs TAVR) as per cardio follow up  8. would change hydralazine to 5mg IV q 6hours PRN for SBP>170  9. neuro follow up Patient is a 83y old  Male who presents with a chief complaint of dizziness (2019 17:42)      BRIEF HOSPITAL COURSE:   83M with PMHx CAD s/p prior CABG (3 grafts , LIMA to LAD, SVG to OM3 and SVG to RCA ) , history of PCI to mid RCA ( with patent LIMA to LAD and patent SVG to CX in 2017) who presented with complaints of intermittent dizziness. Found to have severe AS on echo. Underwent cardiac cath today which revealed patent bypass vessels, EF 55%, peak aortic valve gradient 27. Uncomplicated procedure.      Events last 24 hours: Called by nursing for pt new onset expressive aphasia. Time noted 22:32. CODE stroke called. Pt noted to receive IV hydralazine prior with drop in SBP from 170systolic to 105 systolic at time symptoms noted. Upon arrival by Stroke team pts symptoms noted to resolved now with NIH 0    PAST MEDICAL & SURGICAL HISTORY:  DM2 (diabetes mellitus, type 2)  CAD (coronary artery disease)  HTN (hypertension)  S/P CABG (coronary artery bypass graft)      Review of Systems:  CONSTITUTIONAL: No fever, chills, or fatigue  EYES: No eye pain, visual disturbances, or discharge  ENMT:  No difficulty hearing, tinnitus, vertigo; No sinus or throat pain  NECK: No pain or stiffness  RESPIRATORY: No cough, wheezing, chills or hemoptysis; No shortness of breath  CARDIOVASCULAR: No chest pain, palpitations, dizziness, or leg swelling  GASTROINTESTINAL: No abdominal or epigastric pain. No nausea, vomiting, or hematemesis; No diarrhea or constipation. No melena or hematochezia.  GENITOURINARY: No dysuria, frequency, hematuria, or incontinence  NEUROLOGICAL: No headaches, memory loss, loss of strength, numbness, or tremors  SKIN: No itching, burning, rashes, or lesions   MUSCULOSKELETAL: No joint pain or swelling; No muscle, back, or extremity pain  PSYCHIATRIC: No depression, anxiety, mood swings, or difficulty sleeping      Medications:    amLODIPine   Tablet 5 milliGRAM(s) Oral daily  hydrALAZINE Injectable 10 milliGRAM(s) IV Push every 6 hours PRN          aspirin enteric coated 81 milliGRAM(s) Oral daily  clopidogrel Tablet 75 milliGRAM(s) Oral daily  enoxaparin Injectable 40 milliGRAM(s) SubCutaneous daily    pantoprazole    Tablet 40 milliGRAM(s) Oral before breakfast      atorvastatin 10 milliGRAM(s) Oral at bedtime  dextrose 40% Gel 15 Gram(s) Oral once PRN  dextrose 50% Injectable 12.5 Gram(s) IV Push once  dextrose 50% Injectable 25 Gram(s) IV Push once  dextrose 50% Injectable 25 Gram(s) IV Push once  glucagon  Injectable 1 milliGRAM(s) IntraMuscular once PRN  insulin lispro (HumaLOG) corrective regimen sliding scale   SubCutaneous three times a day before meals    dextrose 5%. 1000 milliLiter(s) IV Continuous <Continuous>      brimonidine 0.2% Ophthalmic Solution 1 Drop(s) Both EYES two times a day  timolol 0.5% Solution 1 Drop(s) Both EYES two times a day            ICU Vital Signs Last 24 Hrs  T(C): 36.4 (2019 21:26), Max: 36.6 (2019 23:54)  T(F): 97.5 (2019 21:26), Max: 97.9 (2019 23:54)  HR: 61 (2019 23:08) (48 - 70)  BP: 136/39 (2019 23:08) (108/57 - 193/79)  BP(mean): 60 (2019 23:08) (60 - 99)  ABP: --  ABP(mean): --  RR: 20 (2019 23:08) (16 - 20)  SpO2: 100% (2019 23:08) (95% - 100%)                LABS:                CAPILLARY BLOOD GLUCOSE  233 (2019 23:02)      POCT Blood Glucose.: 233 mg/dL (2019 22:40)            Physical Examination:    General: No acute distress.  Alert,  interactive, pt with noted difficulty finding words, NIH 1 upon initial evaluation, reeval by stroke team NIH 0, SBP up to 130s    HEENT: Pupils equal, reactive to light.  Symmetric.    PULM: Clear to auscultation bilaterally, no wheezing/rales/rhonchi    CVS: Regular rate and rhythm, + murmur    ABD: Soft, nondistended, nontender, normoactive bowel sounds, no rebound or guarding. Left groin cath site c/d/i, no hematoma or fluctuance.    EXT: No edema, nontender, cap refill <2 sec    SKIN: Warm and well perfused, no rashes noted.    RADIOLOGY:   [facility]  Department of Cardiology  [address] [place] [address]  [phone]  Cath Lab Report -- Comprehensive Report  Patient: VALDEMAR HODGE  Study date: 2019  Account number: [id number]  MR number: [id number]  : 1935  Gender: Male  Race: W  Case Physician(s):  Víctor Harrington MD  Referring Physician:  Kun Partida MD  INDICATIONS: Aortic valve stenosis. Possible ischemic symptoms ( dizziness  on exertion)  HISTORY: The patient has a history of coronary artery disease. The patient  has hypertension and oral hypoglycemic-treated diabetes. PRIOR  CARDIOVASCULAR PROCEDURES: Stent of the mid RCA. Coronary bypass.  PROCEDURE:  --  Left heart catheterization.  --  Left coronary angiography.  --  Right coronary angiography.  --  Bypass graft angiography.  --  Sonosite.  TECHNIQUE: The risks and alternatives of the procedures and conscious  sedation were explained to the patient and informed consent was obtained.  Cardiac catheterization performed electively.  Local anesthetic given. Right femoral artery access. Left heart  catheterization. Left coronary artery angiography. The vessel was injected  utilizing a catheter. Right coronary artery angiography. The vessel was  injected utilizing a catheter. Graft angiography. The vessel was injected  utilizing a catheter. Sonosite. RADIATION EXPOSURE: 8.3 min.  CONTRAST GIVEN: Omnipaque 40 ml.  MEDICATIONS GIVEN: Fentanyl, 50 mcg, IV. Midazolam, 0.5 mg, IV. 1%  Lidocaine, 10 ml, subcutaneously.  VENTRICLES: No LV gram was performed; however, a recent echocardiogram  demonstrated an EF of 55 %.  VALVES: AORTIC VALVE: The aortic valve was evaluated by hemodynamic  measurement, P2P gradient 27 mmHg. There was moderate aortic stenosis.  CORONARY VESSELS: The coronary circulation is right dominant.  LM:   --  Ostial LM: There was a 100 % stenosis.  LAD:   --  LAD: The artery was supplied by a patent bypass graft.  --  Mid LAD: Angiography showed no evidence of disease.  --  Distal LAD: The vessel was small to medium sized. Angiography showed no  evidence of disease.  CX:   --  Mid circumflex: The artery was supplied by retrograde flow from a  graft (OM3 graft) There was a diffuse 80 % stenosis.  --  OM3: The vessel was medium sized. The artery was supplied by a patent  bypass graft.  RCA:   --  Proximal RCA: There was a tubular 50 % stenosis.  --  Mid RCA: patent stent.  --  RPDA: The vessel was medium sized. Angiography showed mild  atherosclerosis with no flow limiting lesions.  GRAFTS:   --  Graft to the LAD: The graft was a large sized MASTERS. Graft  angiography showed no evidence of disease.  --  Graft to the 3rd obtuse marginal: The graft was a large sized saphenous  vein graft from the ascending aorta. Graft angiography showed no evidence  of disease.  COMPLICATIONS: No complications occurred during the cath lab visit.  DIAGNOSTIC IMPRESSIONS: Totally occluded LM  Patent mid RCA stent with moderate proximal RCA stenosis.  Patent MASTERS to LAD  Patent SVG to OM3 and retrogradely filling then whole CX system ( mid 80%  CX stenosis)  Known occluded SVG to RCA ( cath 2017)  Peak to peak gradient on pullback across the aortic valve is 27 mmHg (  likely reflecting moderate aortic stenosis )  Elevated left sided filling pressure ( LVEDP 20 mmHg)  DIAGNOSTIC RECOMMENDATIONS: continue medical therapy for CAD.  Evaluation for AVR ( surgical vs TAVR) based on echo results ( severe low  flow, low gradient aortic stenosis)  Prepared and signed by  Víctor Harrington MD    EXAM:  US DPLX CAROTIDS COMPL BI                          PROCEDURE DATE:  2019          INTERPRETATION:  History: Dizziness. History of carotid stenosis.    Findings: Grayscale, color and spectral Doppler examination of both   carotid arteries was performed.     Correlation made with recent CT 2019. Reported prior studies not   available for comparison.    Grayscale imaging reveals moderate calcified plaque mid through distal   common carotid artery, carotid bulb and proximal internal carotid artery   bilaterally.    Blood flow velocities are as follows:    RIGHT:    PROX CCA = 77 ;  DIST CCA = 96 ;  PROX ICA = 85 ;  DIST ICA =   106 ;  ECA = 421    ICA/CCA ratio 1.1    LEFT   :    PROX CCA = 84 ;  DIST CCA = 98 ;  PROX ICA = 132 ;  DIST ICA   = 139 ;  ECA = 552    ICA/CCA ratio 1.4    Impression: 52 to 69 % stenosis left internal carotid artery by peak   systolic velocity criteria.    EXAM:  XR CHEST PORTABLE IMMED 1V                          PROCEDURE DATE:  2019          INTERPRETATION:  Chest radiograph (one view)     CPT 03418    CLINICAL INFORMATION:  Patient is unable to communicate.  Chest pain.    TECHNIQUE:  Single frontal view of the chest was obtained.    FINDINGS:  No previous examinations are available for review.    The lungs are clear.  No pleural abnormality is seen.      The heart and mediastinum appear intact.  The patient is status post   median sternotomy and coronary artery bypass graft.      IMPRESSION: No evidence of active chest disease.   Status post CABG.       EXAM:  ECHO TRANSTHORACIC COMP W DOPP      PROCEDURE DATE:  2019   .      INTERPRETATION:  REPORT:    TRANSTHORACIC ECHOCARDIOGRAM REPORT         Patient Name:   VALDEMAR HODGE Patient Location: [facility]  Medical Rec #:  [id number]            Accession #:      [id number]  Account #:                          Height:           70.9 in 180.0 cm  YOB: 1935          Weight:           164.9 lb 74.80 kg  Patient Age:    83 years            BSA:              1.94 m²  Patient Gender: M                   BP:               164/72 mmHg       Date of Exam:        2019 8:29:06 AM  Sonographer:         Karlos Cedeño  Referring Physician: Chantale Chadwick    Procedure:     2D Echo/Doppler/Color Doppler Complete.  Indications:   Syncope and collapse - R55  Diagnosis:     Syncope and collapse - R55  Study Details: Technically fair study.         2D AND M-MODE MEASUREMENTS (normal ranges within parentheses):  Left                 Normal   Aorta/Left            Normal  Ventricle:                    Atrium:  IVSd (2D):    1.14  (0.7-1.1) Left Atrium  4.12 cm (1.9-4.0)                 cm             (2D):  LVPWd (2D):   1.20  (0.7-1.1) LA Volume     27.5                 cm             Index         ml/m²  LVIDd (2D):   4.71  (3.4-5.7)                 cm  LVIDs (2D):   2.89                 cm  LV FS (2D):   38.6   (>25%)                  %  Relative Wall 0.51   (<0.42)  Thickness    LV SYSTOLIC FUNCTION BY 2D PLANIMETRY (MOD):  EF-Biplane: 48 %    LV DIASTOLIC FUNCTION:  MV Peak E: 0.50 m/s  MV Peak A: 0.78 m/s  E/A Ratio: 0.64    SPECTRAL DOPPLER ANALYSIS (where applicable):  Aortic Valve: AoV Max Edin: 3.11 m/s AoV Peak P.6 mmHg AoV Mean P.4 mmHg    LVOT Vmax: 0.64 m/s LVOT VTI: 0.176 m LVOT Diameter: 2.15 cm    AoV Area, Vmax: 0.74 cm² AoV Area, VTI: 0.71 cm² AoV Area, Vmn: 0.65 cm²  Ao VTI: 0.897  Tricuspid Valve and PA/RV Systolic Pressure: TR Max Velocity: 2.89 m/s RA   Pressure: 3 mmHg RVSP/PASP: 36.4 mmHg       PHYSICIAN INTERPRETATION:  Left Ventricle: Endocardial visualization was enhanced with intravenous   echo contrast. The left ventricular internal cavity size is normal.  Global LV systolic function was normal. Left ventricular ejection   fraction, by visual estimation, is 55 to 60%. Spectral Doppler shows   impaired relaxation pattern of left ventricular myocardial filling (Grade   I diastolic dysfunction).  Right Ventricle: Normal right ventricular size and function. The right   ventricular size is normal. RV systolic function is normal.  Left Atrium: Normal left atrial size.  Right Atrium: Normal right atrial size.  Pericardium: There is no evidence of pericardial effusion.  Mitral Valve: Mild thickening of the anterior and posterior mitral valve   leaflets. Mild mitral valve regurgitation is seen.  Tricuspid Valve: Mild tricuspid regurgitation is visualized. Estimated   pulmonary artery systolic pressure is 36.4 mmHg assuming a right atrial   pressure of 3 mmHg, which is consistent with borderline pulmonary   hypertension.  Aortic Valve: Sclerotic aortic valve with decreased opening. Peak   transaortic gradient equals 38.6 mmHg, mean transaortic gradient equals   25.4 mmHg, the calculated aortic valve area equals 0.71 cm² by the   continuity equation consistent with severe aortic stenosis. The peak   aortic velocity was obtained from the apical view. No evidence of aortic   valve regurgitation is seen. Low velocity, low gradient aortic stenosis.   The Dimesionless Index value is 0.2.  Pulmonic Valve: The pulmonic valve was not well visualized.  Aorta: The aorta was not well visualized.  Pulmonary Artery: The pulmonary artery is not well seen.  Venous: The inferior vena cava was normal sized, with respiratory size   variation greater than 50%.       Summary:   1. Technically fair study.   2. Endocardial visualization was enhanced with intravenous echo contrast.   3. Normal global left ventricular systolic function.   4. Left ventricular ejection fraction, by visual estimation, is 55 to   60%.   5. Spectral Doppler shows impaired relaxation pattern of left   ventricular myocardial filling (Grade I diastolic dysfunction).   6. There is mild left ventricular hypertrophy.   7. There is no evidence of pericardial effusion.   8. Mild mitral valve regurgitation.   9. Mild thickening of the anterior and posterior mitral valve leaflets.  10. Low velocity, low gradient aortic stenosis.  11. Sclerotic aortic valve with decreased opening.  12. Peak transaortic gradient equals 38.6 mmHg, mean transaortic gradient   equals 25.4 mmHg, the calculated aortic valve area equals 0.71 cm² by the   continuity equation consistent with severe aortic stenosis.  13. Estimated pulmonary artery systolic pressure is 36.4 mmHg assuming a   right atrial pressure of 3 mmHg, which is consistent with borderline   pulmonary hypertension.    Kun Partida MD Electronically signed on 2019 at 10:14:18 AM     EXAM:  CT BRAIN                          PROCEDURE DATE:  2019          INTERPRETATION:  CT HEAD WITHOUT CONTRAST    INDICATION: 83 years old. Male. exp aphasia. High blood pressure    COMPARISON: CTA head and neck 2019.    TECHNIQUE: Noncontrast axial CT head was obtained from the skull base to   vertex.    FINDINGS:  No acute intracranial hemorrhage, mass effect or midline shift.  No CT evidence of acute large territory vascular infarct.  The ventricles and cortical sulci are prominent reflecting parenchymal   volume loss.  Scattered hypodensities in the periventricular white matter are   nonspecific, but likely sequela of small vessel ischemic disease.  Intracranial atherosclerotic calcifications are present.    Mild mucosal thickening of the bilateral ethmoid air cells. The mastoid   air cells are well aerated. The native ocular lenses are surgically   absent. No displaced calvarial fracture.    IMPRESSION:  No acute intracranial hemorrhage or mass effect. Further evaluation with   MRI may be performed as clinically indicated.    Findings were discussed with Dr. Jones on 2019 11:01 PM with readback.    Impression:  1. Aortic stenosis -moderate to severe  2. CAD sp CABG with patent bypass grafts  3. TIA- resolution of symptoms now with NIH 0    Plan:  1. discussed with hospitalist and Neuro (Stephania) no intervention needed, ? whether this was related to relative hypotension given his prior SBP 170s.  2. MRI when stable  3. cont DAPT  4. cont statin  5. transfer to [address] [place], [place] [address]  6. neuro checks q 2hours   7. evaluation of AS needs (surgery vs TAVR) as per cardio follow up  8. would change hydralazine to 5mg IV q 6hours PRN for SBP>170  9. neuro follow up Patient is a 83y old  Male who presents with a chief complaint of dizziness (2019 17:42)      BRIEF HOSPITAL COURSE:   83M with PMHx CAD s/p prior CABG (3 grafts , LIMA to LAD, SVG to OM3 and SVG to RCA ) , history of PCI to mid RCA ( with patent LIMA to LAD and patent SVG to CX in 2017) who presented with complaints of intermittent dizziness. Found to have severe AS on echo. Underwent cardiac cath today which revealed patent bypass vessels, EF 55%, peak aortic valve gradient 27. Uncomplicated procedure.      Events last 24 hours: Called by nursing for pt new onset expressive aphasia. Time noted 22:32. CODE stroke called. Pt noted to receive IV hydralazine prior with drop in SBP from 170systolic to 105 systolic at time symptoms noted. Upon arrival by Stroke team pts symptoms noted to resolved now with NIH 0    PAST MEDICAL & SURGICAL HISTORY:  DM2 (diabetes mellitus, type 2)  CAD (coronary artery disease)  HTN (hypertension)  S/P CABG (coronary artery bypass graft)      Review of Systems:  CONSTITUTIONAL: No fever, chills, or fatigue  EYES: No eye pain, visual disturbances, or discharge  ENMT:  No difficulty hearing, tinnitus, vertigo; No sinus or throat pain  NECK: No pain or stiffness  RESPIRATORY: No cough, wheezing, chills or hemoptysis; No shortness of breath  CARDIOVASCULAR: No chest pain, palpitations, dizziness, or leg swelling  GASTROINTESTINAL: No abdominal or epigastric pain. No nausea, vomiting, or hematemesis; No diarrhea or constipation. No melena or hematochezia.  GENITOURINARY: No dysuria, frequency, hematuria, or incontinence  NEUROLOGICAL: No headaches, memory loss, loss of strength, numbness, or tremors  SKIN: No itching, burning, rashes, or lesions   MUSCULOSKELETAL: No joint pain or swelling; No muscle, back, or extremity pain  PSYCHIATRIC: No depression, anxiety, mood swings, or difficulty sleeping      Medications:    amLODIPine   Tablet 5 milliGRAM(s) Oral daily  hydrALAZINE Injectable 10 milliGRAM(s) IV Push every 6 hours PRN          aspirin enteric coated 81 milliGRAM(s) Oral daily  clopidogrel Tablet 75 milliGRAM(s) Oral daily  enoxaparin Injectable 40 milliGRAM(s) SubCutaneous daily    pantoprazole    Tablet 40 milliGRAM(s) Oral before breakfast      atorvastatin 10 milliGRAM(s) Oral at bedtime  dextrose 40% Gel 15 Gram(s) Oral once PRN  dextrose 50% Injectable 12.5 Gram(s) IV Push once  dextrose 50% Injectable 25 Gram(s) IV Push once  dextrose 50% Injectable 25 Gram(s) IV Push once  glucagon  Injectable 1 milliGRAM(s) IntraMuscular once PRN  insulin lispro (HumaLOG) corrective regimen sliding scale   SubCutaneous three times a day before meals    dextrose 5%. 1000 milliLiter(s) IV Continuous <Continuous>      brimonidine 0.2% Ophthalmic Solution 1 Drop(s) Both EYES two times a day  timolol 0.5% Solution 1 Drop(s) Both EYES two times a day            ICU Vital Signs Last 24 Hrs  T(C): 36.4 (2019 21:26), Max: 36.6 (2019 23:54)  T(F): 97.5 (2019 21:26), Max: 97.9 (2019 23:54)  HR: 61 (2019 23:08) (48 - 70)  BP: 136/39 (2019 23:08) (108/57 - 193/79)  BP(mean): 60 (2019 23:08) (60 - 99)  ABP: --  ABP(mean): --  RR: 20 (2019 23:08) (16 - 20)  SpO2: 100% (2019 23:08) (95% - 100%)                LABS:                CAPILLARY BLOOD GLUCOSE  233 (2019 23:02)      POCT Blood Glucose.: 233 mg/dL (2019 22:40)            Physical Examination:    General: No acute distress.  Alert,  interactive, pt with noted difficulty finding words, NIH 1 upon initial evaluation, reeval by stroke team NIH 0, SBP up to 130s    HEENT: Pupils equal, reactive to light.  Symmetric.    PULM: Clear to auscultation bilaterally, no wheezing/rales/rhonchi    CVS: Regular rate and rhythm, + murmur    ABD: Soft, nondistended, nontender, normoactive bowel sounds, no rebound or guarding. Left groin cath site c/d/i, no hematoma or fluctuance.    EXT: No edema, nontender, cap refill <2 sec    SKIN: Warm and well perfused, no rashes noted.    RADIOLOGY:   [facility]  Department of Cardiology  [address] [place] [id number]  [phone]  Cath Lab Report -- Comprehensive Report  Patient: VALDEMAR HODGE  Study date: 2019  Account number: [id number]  MR number: [id number]  : 1935  Gender: Male  Race: W  Case Physician(s):  Víctor Harrington MD  Referring Physician:  Kun Partida MD  INDICATIONS: Aortic valve stenosis. Possible ischemic symptoms ( dizziness  on exertion)  HISTORY: The patient has a history of coronary artery disease. The patient  has hypertension and oral hypoglycemic-treated diabetes. PRIOR  CARDIOVASCULAR PROCEDURES: Stent of the mid RCA. Coronary bypass.  PROCEDURE:  --  Left heart catheterization.  --  Left coronary angiography.  --  Right coronary angiography.  --  Bypass graft angiography.  --  Sonosite.  TECHNIQUE: The risks and alternatives of the procedures and conscious  sedation were explained to the patient and informed consent was obtained.  Cardiac catheterization performed electively.  Local anesthetic given. Right femoral artery access. Left heart  catheterization. Left coronary artery angiography. The vessel was injected  utilizing a catheter. Right coronary artery angiography. The vessel was  injected utilizing a catheter. Graft angiography. The vessel was injected  utilizing a catheter. Sonosite. RADIATION EXPOSURE: 8.3 min.  CONTRAST GIVEN: Omnipaque 40 ml.  MEDICATIONS GIVEN: Fentanyl, 50 mcg, IV. Midazolam, 0.5 mg, IV. 1%  Lidocaine, 10 ml, subcutaneously.  VENTRICLES: No LV gram was performed; however, a recent echocardiogram  demonstrated an EF of 55 %.  VALVES: AORTIC VALVE: The aortic valve was evaluated by hemodynamic  measurement, P2P gradient 27 mmHg. There was moderate aortic stenosis.  CORONARY VESSELS: The coronary circulation is right dominant.  LM:   --  Ostial LM: There was a 100 % stenosis.  LAD:   --  LAD: The artery was supplied by a patent bypass graft.  --  Mid LAD: Angiography showed no evidence of disease.  --  Distal LAD: The vessel was small to medium sized. Angiography showed no  evidence of disease.  CX:   --  Mid circumflex: The artery was supplied by retrograde flow from a  graft (OM3 graft) There was a diffuse 80 % stenosis.  --  OM3: The vessel was medium sized. The artery was supplied by a patent  bypass graft.  RCA:   --  Proximal RCA: There was a tubular 50 % stenosis.  --  Mid RCA: patent stent.  --  RPDA: The vessel was medium sized. Angiography showed mild  atherosclerosis with no flow limiting lesions.  GRAFTS:   --  Graft to the LAD: The graft was a large sized MASTERS. Graft  angiography showed no evidence of disease.  --  Graft to the 3rd obtuse marginal: The graft was a large sized saphenous  vein graft from the ascending aorta. Graft angiography showed no evidence  of disease.  COMPLICATIONS: No complications occurred during the cath lab visit.  DIAGNOSTIC IMPRESSIONS: Totally occluded LM  Patent mid RCA stent with moderate proximal RCA stenosis.  Patent MASTERS to LAD  Patent SVG to OM3 and retrogradely filling then whole CX system ( mid 80%  CX stenosis)  Known occluded SVG to RCA ( cath 2017)  Peak to peak gradient on pullback across the aortic valve is 27 mmHg (  likely reflecting moderate aortic stenosis )  Elevated left sided filling pressure ( LVEDP 20 mmHg)  DIAGNOSTIC RECOMMENDATIONS: continue medical therapy for CAD.  Evaluation for AVR ( surgical vs TAVR) based on echo results ( severe low  flow, low gradient aortic stenosis)  Prepared and signed by  Víctor Harrington MD    EXAM:  US DPLX CAROTIDS COMPL BI                          PROCEDURE DATE:  2019          INTERPRETATION:  History: Dizziness. History of carotid stenosis.    Findings: Grayscale, color and spectral Doppler examination of both   carotid arteries was performed.     Correlation made with recent CT 2019. Reported prior studies not   available for comparison.    Grayscale imaging reveals moderate calcified plaque mid through distal   common carotid artery, carotid bulb and proximal internal carotid artery   bilaterally.    Blood flow velocities are as follows:    RIGHT:    PROX CCA = 77 ;  DIST CCA = 96 ;  PROX ICA = 85 ;  DIST ICA =   106 ;  ECA = 421    ICA/CCA ratio 1.1    LEFT   :    PROX CCA = 84 ;  DIST CCA = 98 ;  PROX ICA = 132 ;  DIST ICA   = 139 ;  ECA = 552    ICA/CCA ratio 1.4    Impression: 52 to 69 % stenosis left internal carotid artery by peak   systolic velocity criteria.    EXAM:  XR CHEST PORTABLE IMMED 1V                          PROCEDURE DATE:  2019          INTERPRETATION:  Chest radiograph (one view)     CPT 45207    CLINICAL INFORMATION:  Patient is unable to communicate.  Chest pain.    TECHNIQUE:  Single frontal view of the chest was obtained.    FINDINGS:  No previous examinations are available for review.    The lungs are clear.  No pleural abnormality is seen.      The heart and mediastinum appear intact.  The patient is status post   median sternotomy and coronary artery bypass graft.      IMPRESSION: No evidence of active chest disease.   Status post CABG.       EXAM:  ECHO TRANSTHORACIC COMP W DOPP      PROCEDURE DATE:  2019   .      INTERPRETATION:  REPORT:    TRANSTHORACIC ECHOCARDIOGRAM REPORT         Patient Name:   VALDEMAR HODGE Patient Location: [facility]  Medical Rec #:  [id number]            Accession #:      [id number]  Account #:                          Height:           70.9 in 180.0 cm  YOB: 1935          Weight:           164.9 lb 74.80 kg  Patient Age:    83 years            BSA:              1.94 m²  Patient Gender: M                   BP:               164/72 mmHg       Date of Exam:        2019 8:29:06 AM  Sonographer:         Karlos Cedeño  Referring Physician: Chantale Chadwick    Procedure:     2D Echo/Doppler/Color Doppler Complete.  Indications:   Syncope and collapse - R55  Diagnosis:     Syncope and collapse - R55  Study Details: Technically fair study.         2D AND M-MODE MEASUREMENTS (normal ranges within parentheses):  Left                 Normal   Aorta/Left            Normal  Ventricle:                    Atrium:  IVSd (2D):    1.14  (0.7-1.1) Left Atrium  4.12 cm (1.9-4.0)                 cm             (2D):  LVPWd (2D):   1.20  (0.7-1.1) LA Volume     27.5                 cm             Index         ml/m²  LVIDd (2D):   4.71  (3.4-5.7)                 cm  LVIDs (2D):   2.89                 cm  LV FS (2D):   38.6   (>25%)                  %  Relative Wall 0.51   (<0.42)  Thickness    LV SYSTOLIC FUNCTION BY 2D PLANIMETRY (MOD):  EF-Biplane: 48 %    LV DIASTOLIC FUNCTION:  MV Peak E: 0.50 m/s  MV Peak A: 0.78 m/s  E/A Ratio: 0.64    SPECTRAL DOPPLER ANALYSIS (where applicable):  Aortic Valve: AoV Max Edin: 3.11 m/s AoV Peak P.6 mmHg AoV Mean P.4 mmHg    LVOT Vmax: 0.64 m/s LVOT VTI: 0.176 m LVOT Diameter: 2.15 cm    AoV Area, Vmax: 0.74 cm² AoV Area, VTI: 0.71 cm² AoV Area, Vmn: 0.65 cm²  Ao VTI: 0.897  Tricuspid Valve and PA/RV Systolic Pressure: TR Max Velocity: 2.89 m/s RA   Pressure: 3 mmHg RVSP/PASP: 36.4 mmHg       PHYSICIAN INTERPRETATION:  Left Ventricle: Endocardial visualization was enhanced with intravenous   echo contrast. The left ventricular internal cavity size is normal.  Global LV systolic function was normal. Left ventricular ejection   fraction, by visual estimation, is 55 to 60%. Spectral Doppler shows   impaired relaxation pattern of left ventricular myocardial filling (Grade   I diastolic dysfunction).  Right Ventricle: Normal right ventricular size and function. The right   ventricular size is normal. RV systolic function is normal.  Left Atrium: Normal left atrial size.  Right Atrium: Normal right atrial size.  Pericardium: There is no evidence of pericardial effusion.  Mitral Valve: Mild thickening of the anterior and posterior mitral valve   leaflets. Mild mitral valve regurgitation is seen.  Tricuspid Valve: Mild tricuspid regurgitation is visualized. Estimated   pulmonary artery systolic pressure is 36.4 mmHg assuming a right atrial   pressure of 3 mmHg, which is consistent with borderline pulmonary   hypertension.  Aortic Valve: Sclerotic aortic valve with decreased opening. Peak   transaortic gradient equals 38.6 mmHg, mean transaortic gradient equals   25.4 mmHg, the calculated aortic valve area equals 0.71 cm² by the   continuity equation consistent with severe aortic stenosis. The peak   aortic velocity was obtained from the apical view. No evidence of aortic   valve regurgitation is seen. Low velocity, low gradient aortic stenosis.   The Dimesionless Index value is 0.2.  Pulmonic Valve: The pulmonic valve was not well visualized.  Aorta: The aorta was not well visualized.  Pulmonary Artery: The pulmonary artery is not well seen.  Venous: The inferior vena cava was normal sized, with respiratory size   variation greater than 50%.       Summary:   1. Technically fair study.   2. Endocardial visualization was enhanced with intravenous echo contrast.   3. Normal global left ventricular systolic function.   4. Left ventricular ejection fraction, by visual estimation, is 55 to   60%.   5. Spectral Doppler shows impaired relaxation pattern of left   ventricular myocardial filling (Grade I diastolic dysfunction).   6. There is mild left ventricular hypertrophy.   7. There is no evidence of pericardial effusion.   8. Mild mitral valve regurgitation.   9. Mild thickening of the anterior and posterior mitral valve leaflets.  10. Low velocity, low gradient aortic stenosis.  11. Sclerotic aortic valve with decreased opening.  12. Peak transaortic gradient equals 38.6 mmHg, mean transaortic gradient   equals 25.4 mmHg, the calculated aortic valve area equals 0.71 cm² by the   continuity equation consistent with severe aortic stenosis.  13. Estimated pulmonary artery systolic pressure is 36.4 mmHg assuming a   right atrial pressure of 3 mmHg, which is consistent with borderline   pulmonary hypertension.    Kun Partida MD Electronically signed on 2019 at 10:14:18 AM     EXAM:  CT BRAIN                          PROCEDURE DATE:  2019          INTERPRETATION:  CT HEAD WITHOUT CONTRAST    INDICATION: 83 years old. Male. exp aphasia. High blood pressure    COMPARISON: CTA head and neck 2019.    TECHNIQUE: Noncontrast axial CT head was obtained from the skull base to   vertex.    FINDINGS:  No acute intracranial hemorrhage, mass effect or midline shift.  No CT evidence of acute large territory vascular infarct.  The ventricles and cortical sulci are prominent reflecting parenchymal   volume loss.  Scattered hypodensities in the periventricular white matter are   nonspecific, but likely sequela of small vessel ischemic disease.  Intracranial atherosclerotic calcifications are present.    Mild mucosal thickening of the bilateral ethmoid air cells. The mastoid   air cells are well aerated. The native ocular lenses are surgically   absent. No displaced calvarial fracture.    IMPRESSION:  No acute intracranial hemorrhage or mass effect. Further evaluation with   MRI may be performed as clinically indicated.    Findings were discussed with Dr. Jones on 2019 11:01 PM with readback.    Impression:  1. Aortic stenosis -moderate to severe  2. CAD sp CABG with patent bypass grafts  3. TIA- resolution of symptoms now with NIH 0    Plan:  1. discussed with hospitalist and Neuro (Stephania) no intervention needed, ? whether this was related to relative hypotension given his prior SBP 170s.  2. MRI when stable  3. cont DAPT  4. cont statin  5. transfer to [address] [place], [place] [address]  6. neuro checks q 2hours   7. evaluation of AS needs (surgery vs TAVR) as per cardio follow up  8. would change hydralazine to 5mg IV q 6hours PRN for SBP>170  9. neuro follow up    Dr Harrington notified of events. Patient is a 83y old  Male who presents with a chief complaint of dizziness (2019 17:42)      BRIEF HOSPITAL COURSE:   83M with PMHx CAD s/p prior CABG (3 grafts , LIMA to LAD, SVG to OM3 and SVG to RCA ) , history of PCI to mid RCA ( with patent LIMA to LAD and patent SVG to CX in 2017) who presented with complaints of intermittent dizziness. Found to have severe AS on echo. Underwent cardiac cath today which revealed patent bypass vessels, EF 55%, peak aortic valve gradient 27. Uncomplicated procedure.      Events last 24 hours: Called by nursing for pt new onset expressive aphasia. Time noted 22:32. CODE stroke called. Pt noted to receive IV hydralazine prior with drop in SBP from 170systolic to 105 systolic at time symptoms noted. Upon arrival by Stroke team pts symptoms noted to resolved now with NIH 0    PAST MEDICAL & SURGICAL HISTORY:  DM2 (diabetes mellitus, type 2)  CAD (coronary artery disease)  HTN (hypertension)  S/P CABG (coronary artery bypass graft)      Review of Systems:  CONSTITUTIONAL: No fever, chills, or fatigue  EYES: No eye pain, visual disturbances, or discharge  ENMT:  No difficulty hearing, tinnitus, vertigo; No sinus or throat pain  NECK: No pain or stiffness  RESPIRATORY: No cough, wheezing, chills or hemoptysis; No shortness of breath  CARDIOVASCULAR: No chest pain, palpitations, dizziness, or leg swelling  GASTROINTESTINAL: No abdominal or epigastric pain. No nausea, vomiting, or hematemesis; No diarrhea or constipation. No melena or hematochezia.  GENITOURINARY: No dysuria, frequency, hematuria, or incontinence  NEUROLOGICAL: No headaches, memory loss, loss of strength, numbness, or tremors  SKIN: No itching, burning, rashes, or lesions   MUSCULOSKELETAL: No joint pain or swelling; No muscle, back, or extremity pain  PSYCHIATRIC: No depression, anxiety, mood swings, or difficulty sleeping      Medications:    amLODIPine   Tablet 5 milliGRAM(s) Oral daily  hydrALAZINE Injectable 10 milliGRAM(s) IV Push every 6 hours PRN          aspirin enteric coated 81 milliGRAM(s) Oral daily  clopidogrel Tablet 75 milliGRAM(s) Oral daily  enoxaparin Injectable 40 milliGRAM(s) SubCutaneous daily    pantoprazole    Tablet 40 milliGRAM(s) Oral before breakfast      atorvastatin 10 milliGRAM(s) Oral at bedtime  dextrose 40% Gel 15 Gram(s) Oral once PRN  dextrose 50% Injectable 12.5 Gram(s) IV Push once  dextrose 50% Injectable 25 Gram(s) IV Push once  dextrose 50% Injectable 25 Gram(s) IV Push once  glucagon  Injectable 1 milliGRAM(s) IntraMuscular once PRN  insulin lispro (HumaLOG) corrective regimen sliding scale   SubCutaneous three times a day before meals    dextrose 5%. 1000 milliLiter(s) IV Continuous <Continuous>      brimonidine 0.2% Ophthalmic Solution 1 Drop(s) Both EYES two times a day  timolol 0.5% Solution 1 Drop(s) Both EYES two times a day            ICU Vital Signs Last 24 Hrs  T(C): 36.4 (2019 21:26), Max: 36.6 (2019 23:54)  T(F): 97.5 (2019 21:26), Max: 97.9 (2019 23:54)  HR: 61 (2019 23:08) (48 - 70)  BP: 136/39 (2019 23:08) (108/57 - 193/79)  BP(mean): 60 (2019 23:08) (60 - 99)  ABP: --  ABP(mean): --  RR: 20 (2019 23:08) (16 - 20)  SpO2: 100% (2019 23:08) (95% - 100%)                LABS:                CAPILLARY BLOOD GLUCOSE  233 (2019 23:02)      POCT Blood Glucose.: 233 mg/dL (2019 22:40)            Physical Examination:    General: No acute distress.  Alert,  interactive, pt with noted difficulty finding words, NIH 1 upon initial evaluation, reeval by stroke team NIH 0, SBP up to 130s    HEENT: Pupils equal, reactive to light.  Symmetric.    PULM: Clear to auscultation bilaterally, no wheezing/rales/rhonchi    CVS: Regular rate and rhythm, + murmur    ABD: Soft, nondistended, nontender, normoactive bowel sounds, no rebound or guarding. Left groin cath site c/d/i, no hematoma or fluctuance.    EXT: No edema, nontender, cap refill <2 sec    SKIN: Warm and well perfused, no rashes noted.    RADIOLOGY:   [facility]  Department of Cardiology  [address] [place] [id number]  [phone]  Cath Lab Report -- Comprehensive Report  Patient: VALDEMAR HODGE  Study date: 2019  Account number: [id number]  MR number: [id number]  : 1935  Gender: Male  Race: W  Case Physician(s):  Víctor Harrington MD  Referring Physician:  Kun Partida MD  INDICATIONS: Aortic valve stenosis. Possible ischemic symptoms ( dizziness  on exertion)  HISTORY: The patient has a history of coronary artery disease. The patient  has hypertension and oral hypoglycemic-treated diabetes. PRIOR  CARDIOVASCULAR PROCEDURES: Stent of the mid RCA. Coronary bypass.  PROCEDURE:  --  Left heart catheterization.  --  Left coronary angiography.  --  Right coronary angiography.  --  Bypass graft angiography.  --  Sonosite.  TECHNIQUE: The risks and alternatives of the procedures and conscious  sedation were explained to the patient and informed consent was obtained.  Cardiac catheterization performed electively.  Local anesthetic given. Right femoral artery access. Left heart  catheterization. Left coronary artery angiography. The vessel was injected  utilizing a catheter. Right coronary artery angiography. The vessel was  injected utilizing a catheter. Graft angiography. The vessel was injected  utilizing a catheter. Sonosite. RADIATION EXPOSURE: 8.3 min.  CONTRAST GIVEN: Omnipaque 40 ml.  MEDICATIONS GIVEN: Fentanyl, 50 mcg, IV. Midazolam, 0.5 mg, IV. 1%  Lidocaine, 10 ml, subcutaneously.  VENTRICLES: No LV gram was performed; however, a recent echocardiogram  demonstrated an EF of 55 %.  VALVES: AORTIC VALVE: The aortic valve was evaluated by hemodynamic  measurement, P2P gradient 27 mmHg. There was moderate aortic stenosis.  CORONARY VESSELS: The coronary circulation is right dominant.  LM:   --  Ostial LM: There was a 100 % stenosis.  LAD:   --  LAD: The artery was supplied by a patent bypass graft.  --  Mid LAD: Angiography showed no evidence of disease.  --  Distal LAD: The vessel was small to medium sized. Angiography showed no  evidence of disease.  CX:   --  Mid circumflex: The artery was supplied by retrograde flow from a  graft (OM3 graft) There was a diffuse 80 % stenosis.  --  OM3: The vessel was medium sized. The artery was supplied by a patent  bypass graft.  RCA:   --  Proximal RCA: There was a tubular 50 % stenosis.  --  Mid RCA: patent stent.  --  RPDA: The vessel was medium sized. Angiography showed mild  atherosclerosis with no flow limiting lesions.  GRAFTS:   --  Graft to the LAD: The graft was a large sized MASTERS. Graft  angiography showed no evidence of disease.  --  Graft to the 3rd obtuse marginal: The graft was a large sized saphenous  vein graft from the ascending aorta. Graft angiography showed no evidence  of disease.  COMPLICATIONS: No complications occurred during the cath lab visit.  DIAGNOSTIC IMPRESSIONS: Totally occluded LM  Patent mid RCA stent with moderate proximal RCA stenosis.  Patent MASTERS to LAD  Patent SVG to OM3 and retrogradely filling then whole CX system ( mid 80%  CX stenosis)  Known occluded SVG to RCA ( cath 2017)  Peak to peak gradient on pullback across the aortic valve is 27 mmHg (  likely reflecting moderate aortic stenosis )  Elevated left sided filling pressure ( LVEDP 20 mmHg)  DIAGNOSTIC RECOMMENDATIONS: continue medical therapy for CAD.  Evaluation for AVR ( surgical vs TAVR) based on echo results ( severe low  flow, low gradient aortic stenosis)  Prepared and signed by  Víctor Harrington MD    EXAM:  US DPLX CAROTIDS COMPL BI                          PROCEDURE DATE:  2019          INTERPRETATION:  History: Dizziness. History of carotid stenosis.    Findings: Grayscale, color and spectral Doppler examination of both   carotid arteries was performed.     Correlation made with recent CT 2019. Reported prior studies not   available for comparison.    Grayscale imaging reveals moderate calcified plaque mid through distal   common carotid artery, carotid bulb and proximal internal carotid artery   bilaterally.    Blood flow velocities are as follows:    RIGHT:    PROX CCA = 77 ;  DIST CCA = 96 ;  PROX ICA = 85 ;  DIST ICA =   106 ;  ECA = 421    ICA/CCA ratio 1.1    LEFT   :    PROX CCA = 84 ;  DIST CCA = 98 ;  PROX ICA = 132 ;  DIST ICA   = 139 ;  ECA = 552    ICA/CCA ratio 1.4    Impression: 52 to 69 % stenosis left internal carotid artery by peak   systolic velocity criteria.    EXAM:  XR CHEST PORTABLE IMMED 1V                          PROCEDURE DATE:  2019          INTERPRETATION:  Chest radiograph (one view)     CPT 19306    CLINICAL INFORMATION:  Patient is unable to communicate.  Chest pain.    TECHNIQUE:  Single frontal view of the chest was obtained.    FINDINGS:  No previous examinations are available for review.    The lungs are clear.  No pleural abnormality is seen.      The heart and mediastinum appear intact.  The patient is status post   median sternotomy and coronary artery bypass graft.      IMPRESSION: No evidence of active chest disease.   Status post CABG.       EXAM:  ECHO TRANSTHORACIC COMP W DOPP      PROCEDURE DATE:  2019   .      INTERPRETATION:  REPORT:    TRANSTHORACIC ECHOCARDIOGRAM REPORT         Patient Name:   VALDEMAR HODGE Patient Location: [facility]  Medical Rec #:  [id number]            Accession #:      [id number]  Account #:                          Height:           70.9 in 180.0 cm  YOB: 1935          Weight:           164.9 lb 74.80 kg  Patient Age:    83 years            BSA:              1.94 m²  Patient Gender: M                   BP:               164/72 mmHg       Date of Exam:        2019 8:29:06 AM  Sonographer:         Karlos Cedeño  Referring Physician: Chantale Chadwick    Procedure:     2D Echo/Doppler/Color Doppler Complete.  Indications:   Syncope and collapse - R55  Diagnosis:     Syncope and collapse - R55  Study Details: Technically fair study.         2D AND M-MODE MEASUREMENTS (normal ranges within parentheses):  Left                 Normal   Aorta/Left            Normal  Ventricle:                    Atrium:  IVSd (2D):    1.14  (0.7-1.1) Left Atrium  4.12 cm (1.9-4.0)                 cm             (2D):  LVPWd (2D):   1.20  (0.7-1.1) LA Volume     27.5                 cm             Index         ml/m²  LVIDd (2D):   4.71  (3.4-5.7)                 cm  LVIDs (2D):   2.89                 cm  LV FS (2D):   38.6   (>25%)                  %  Relative Wall 0.51   (<0.42)  Thickness    LV SYSTOLIC FUNCTION BY 2D PLANIMETRY (MOD):  EF-Biplane: 48 %    LV DIASTOLIC FUNCTION:  MV Peak E: 0.50 m/s  MV Peak A: 0.78 m/s  E/A Ratio: 0.64    SPECTRAL DOPPLER ANALYSIS (where applicable):  Aortic Valve: AoV Max Edin: 3.11 m/s AoV Peak P.6 mmHg AoV Mean P.4 mmHg    LVOT Vmax: 0.64 m/s LVOT VTI: 0.176 m LVOT Diameter: 2.15 cm    AoV Area, Vmax: 0.74 cm² AoV Area, VTI: 0.71 cm² AoV Area, Vmn: 0.65 cm²  Ao VTI: 0.897  Tricuspid Valve and PA/RV Systolic Pressure: TR Max Velocity: 2.89 m/s RA   Pressure: 3 mmHg RVSP/PASP: 36.4 mmHg       PHYSICIAN INTERPRETATION:  Left Ventricle: Endocardial visualization was enhanced with intravenous   echo contrast. The left ventricular internal cavity size is normal.  Global LV systolic function was normal. Left ventricular ejection   fraction, by visual estimation, is 55 to 60%. Spectral Doppler shows   impaired relaxation pattern of left ventricular myocardial filling (Grade   I diastolic dysfunction).  Right Ventricle: Normal right ventricular size and function. The right   ventricular size is normal. RV systolic function is normal.  Left Atrium: Normal left atrial size.  Right Atrium: Normal right atrial size.  Pericardium: There is no evidence of pericardial effusion.  Mitral Valve: Mild thickening of the anterior and posterior mitral valve   leaflets. Mild mitral valve regurgitation is seen.  Tricuspid Valve: Mild tricuspid regurgitation is visualized. Estimated   pulmonary artery systolic pressure is 36.4 mmHg assuming a right atrial   pressure of 3 mmHg, which is consistent with borderline pulmonary   hypertension.  Aortic Valve: Sclerotic aortic valve with decreased opening. Peak   transaortic gradient equals 38.6 mmHg, mean transaortic gradient equals   25.4 mmHg, the calculated aortic valve area equals 0.71 cm² by the   continuity equation consistent with severe aortic stenosis. The peak   aortic velocity was obtained from the apical view. No evidence of aortic   valve regurgitation is seen. Low velocity, low gradient aortic stenosis.   The Dimesionless Index value is 0.2.  Pulmonic Valve: The pulmonic valve was not well visualized.  Aorta: The aorta was not well visualized.  Pulmonary Artery: The pulmonary artery is not well seen.  Venous: The inferior vena cava was normal sized, with respiratory size   variation greater than 50%.       Summary:   1. Technically fair study.   2. Endocardial visualization was enhanced with intravenous echo contrast.   3. Normal global left ventricular systolic function.   4. Left ventricular ejection fraction, by visual estimation, is 55 to   60%.   5. Spectral Doppler shows impaired relaxation pattern of left   ventricular myocardial filling (Grade I diastolic dysfunction).   6. There is mild left ventricular hypertrophy.   7. There is no evidence of pericardial effusion.   8. Mild mitral valve regurgitation.   9. Mild thickening of the anterior and posterior mitral valve leaflets.  10. Low velocity, low gradient aortic stenosis.  11. Sclerotic aortic valve with decreased opening.  12. Peak transaortic gradient equals 38.6 mmHg, mean transaortic gradient   equals 25.4 mmHg, the calculated aortic valve area equals 0.71 cm² by the   continuity equation consistent with severe aortic stenosis.  13. Estimated pulmonary artery systolic pressure is 36.4 mmHg assuming a   right atrial pressure of 3 mmHg, which is consistent with borderline   pulmonary hypertension.    Kun Partida MD Electronically signed on 2019 at 10:14:18 AM     EXAM:  CT BRAIN                          PROCEDURE DATE:  2019          INTERPRETATION:  CT HEAD WITHOUT CONTRAST    INDICATION: 83 years old. Male. exp aphasia. High blood pressure    COMPARISON: CTA head and neck 2019.    TECHNIQUE: Noncontrast axial CT head was obtained from the skull base to   vertex.    FINDINGS:  No acute intracranial hemorrhage, mass effect or midline shift.  No CT evidence of acute large territory vascular infarct.  The ventricles and cortical sulci are prominent reflecting parenchymal   volume loss.  Scattered hypodensities in the periventricular white matter are   nonspecific, but likely sequela of small vessel ischemic disease.  Intracranial atherosclerotic calcifications are present.    Mild mucosal thickening of the bilateral ethmoid air cells. The mastoid   air cells are well aerated. The native ocular lenses are surgically   absent. No displaced calvarial fracture.    IMPRESSION:  No acute intracranial hemorrhage or mass effect. Further evaluation with   MRI may be performed as clinically indicated.    Findings were discussed with Dr. Jones on 2019 11:01 PM with readback.    Impression:  1. Aortic stenosis -moderate to severe  2. CAD sp CABG with patent bypass grafts sp cardiac catherization  3. TIA- resolution of symptoms now with NIH 0    Plan:  1. discussed with hospitalist and Neuro (Stephania) no intervention needed, ? whether this was related to relative hypotension given his prior SBP 170s.  2. MRI when stable  3. cont DAPT  4. cont statin  5. transfer to [address] [place] [address]  6. neuro checks q 2hours   7. evaluation of AS needs (surgery vs TAVR) as per cardio follow up  8. would change hydralazine to 5mg IV q 6hours PRN for SBP>170  9. neuro follow up    Dr Harrington notified of events.

## 2019-07-22 NOTE — CONSULT NOTE ADULT - SUBJECTIVE AND OBJECTIVE BOX
CARDIOLOGY CONSULTATION NOTE ([facility] Cardiology)  Consult requested by:  Kun Partida    Reason for Consultation: severe aortic stenosis, coronary angiogram indicated    History obtained by: Patient and medical record     obtained: No    Chief complaint:    Patient is a 83y old  Male who presents with a chief complaint of dizziness (22 Jul 2019 17:42)      HPI:  83M with PMH of CAD s/p prior CABG (3 grafts , LIMA to LAD, SVG to OM3 and SVG to RCA ) , history of PCI to mid RCA ( with patent LIMA to LAD and patent SVG to CX in 2017) , presented with complaints of intermittent dizziness. The patient reported that he has had intermittent episodes of dizziness which is mostly when he gets up from bed, sometimes on exertion, lasting longer now as per wife. He also c/o of exertional lower sternal discomfort that happens occasionally when he walks that resolves with rest.  Echo showed low flow, low gradient severe aortic stenosis.               REVIEW OF SYMPTOMS: Cardiovascular:  as per HPI;  Respiratory:  as per HPI  Genitourinary:  No dysuria, no hematuria; Gastrointestinal:  No nausea, no vomiting. No diarrhea.  No abdominal pain. No dark color stool, no melena ; Neurological: No headache, no dizziness, no slurred speech;  Psychiatric: No agitation, no anxiety.  ALL OTHER REVIEW OF SYSTEMS ARE NEGATIVE.    ALLERGIES: Allergies    No Known Allergies    Intolerances        MEDICATIONS  (STANDING):  amLODIPine   Tablet 5 milliGRAM(s) Oral daily  aspirin enteric coated 81 milliGRAM(s) Oral daily  atorvastatin 10 milliGRAM(s) Oral at bedtime  brimonidine 0.2% Ophthalmic Solution 1 Drop(s) Both EYES two times a day  clopidogrel Tablet 75 milliGRAM(s) Oral daily  dextrose 5%. 1000 milliLiter(s) (50 mL/Hr) IV Continuous <Continuous>  dextrose 50% Injectable 12.5 Gram(s) IV Push once  dextrose 50% Injectable 25 Gram(s) IV Push once  dextrose 50% Injectable 25 Gram(s) IV Push once  enoxaparin Injectable 40 milliGRAM(s) SubCutaneous daily  insulin lispro (HumaLOG) corrective regimen sliding scale   SubCutaneous three times a day before meals  pantoprazole    Tablet 40 milliGRAM(s) Oral before breakfast  timolol 0.5% Solution 1 Drop(s) Both EYES two times a day    MEDICATIONS  (PRN):  dextrose 40% Gel 15 Gram(s) Oral once PRN Blood Glucose LESS THAN 70 milliGRAM(s)/deciliter  glucagon  Injectable 1 milliGRAM(s) IntraMuscular once PRN Glucose LESS THAN 70 milligrams/deciliter  hydrALAZINE Injectable 10 milliGRAM(s) IV Push every 6 hours PRN SBP > 160            PAST MEDICAL HISTORY  DM2 (diabetes mellitus, type 2)  CAD (coronary artery disease)  HTN (hypertension)      PAST SURGICAL HISTORY  S/P CABG (coronary artery bypass graft)      FAMILY HISTORY:  no contributory in 1st degree relative.    SOCIAL HISTORY:  Denies smoking/alcohol/drugs      Vital Signs Last 24 Hrs  T(C): 36.5 (22 Jul 2019 14:29), Max: 36.6 (21 Jul 2019 23:54)  T(F): 97.7 (22 Jul 2019 14:29), Max: 97.9 (21 Jul 2019 23:54)  HR: 50 (22 Jul 2019 18:05) (48 - 70)  BP: 173/75 (22 Jul 2019 18:05) (139/65 - 190/79)  BP(mean): 99 (22 Jul 2019 12:24) (91 - 99)  RR: 17 (22 Jul 2019 18:05) (16 - 18)  SpO2: 98% (22 Jul 2019 18:05) (96% - 100%)      PHYSICAL EXAM:  Constitutional: Comfortable . No acute distress.   HEENT: Atraumatic and normcephalic , neck is supple . no JVD. No carotid bruit.   CNS: A&Ox3. No focal deficits.  Lymph Nodes: Cervical : Not palpable.  Respiratory: CTAB  Cardiovascular: S1S2 RRR. No murmur/rubs or gallop.  Gastrointestinal: Soft non-tender and non distended . +Bowel sounds, no HSM  Extremities: No edema , DP and PT +2   Psychiatric: Calm . no agitation., mood appropriate  Skin: No skin lesions    Intake and output:     LABS:          ;p-BNP=Serum Pro-Brain Natriuretic Peptide: 782 pg/mL (07-20 @ 11:05)          INTERPRETATION OF TELEMETRY:  ECG: sinus bradycardia,   PACs.    RADIOLOGY & ADDITIONAL STUDIES:    X-ray:   < from: Xray Chest 1 View-PORTABLE IMMEDIATE (07.20.19 @ 11:45) >  IMPRESSION: No evidence of active chest disease.   Status post CABG.     < end of copied text >        < end of copied text >      ECHO FINDINGS:     < from: TTE Echo Complete w/Doppler (07.22.19 @ 08:29) >   1. Technically fair study.   2. Endocardial visualization was enhanced with intravenous echo contrast.   3. Normal global left ventricular systolic function.   4. Left ventricular ejection fraction, by visual estimation, is 55 to   60%.   5. Spectral Doppler shows impaired relaxation pattern of left   ventricular myocardial filling (Grade I diastolic dysfunction).   6. There is mild left ventricular hypertrophy.   7. There is no evidence of pericardial effusion.   8. Mild mitral valve regurgitation.   9. Mild thickening of the anterior and posterior mitral valve leaflets.  10. Low velocity, low gradient aortic stenosis.  11. Sclerotic aortic valve with decreased opening.  12. Peak transaortic gradient equals 38.6 mmHg, mean transaortic gradient   equals 25.4 mmHg, the calculated aortic valve area equals 0.71 cm² by the   continuity equation consistent with severe aortic stenosis.  13. Estimated pulmonary artery systolic pressure is 36.4 mmHg assuming a   right atrial pressure of 3 mmHg, whichis consistent with borderline   pulmonary hypertension.    < end of copied text >    STRESS  FINDINGS:     Catheterization Findings

## 2019-07-22 NOTE — PROGRESS NOTE ADULT - SUBJECTIVE AND OBJECTIVE BOX
INTERVAL HISTORY:  stable, no dizziness      VITAL SIGNS:  Vital Signs Last 24 Hrs  T(C): 36.6 (22 Jul 2019 07:29), Max: 36.6 (21 Jul 2019 23:54)  T(F): 97.8 (22 Jul 2019 07:29), Max: 97.9 (21 Jul 2019 23:54)  HR: 49 (22 Jul 2019 07:29) (49 - 70)  BP: 157/58 (22 Jul 2019 07:29) (139/65 - 167/68)  BP(mean): 91 (22 Jul 2019 07:29) (91 - 91)  RR: 18 (22 Jul 2019 07:29) (16 - 18)  SpO2: 96% (22 Jul 2019 07:29) (96% - 100%)    PHYSICAL EXAMINATION:    Mentation:  nl  Language/Speech: nl  CN: nl  Motor: nl  Sensory: nl         MEDS:  MEDICATIONS  (STANDING):  amLODIPine   Tablet 5 milliGRAM(s) Oral daily  aspirin enteric coated 81 milliGRAM(s) Oral daily  atorvastatin 10 milliGRAM(s) Oral at bedtime  brimonidine 0.2% Ophthalmic Solution 1 Drop(s) Both EYES two times a day  clopidogrel Tablet 75 milliGRAM(s) Oral daily  dextrose 5%. 1000 milliLiter(s) (50 mL/Hr) IV Continuous <Continuous>  dextrose 50% Injectable 12.5 Gram(s) IV Push once  dextrose 50% Injectable 25 Gram(s) IV Push once  dextrose 50% Injectable 25 Gram(s) IV Push once  enoxaparin Injectable 40 milliGRAM(s) SubCutaneous daily  insulin lispro (HumaLOG) corrective regimen sliding scale   SubCutaneous three times a day before meals  pantoprazole    Tablet 40 milliGRAM(s) Oral before breakfast  timolol 0.5% Solution 1 Drop(s) Both EYES two times a day    MEDICATIONS  (PRN):  dextrose 40% Gel 15 Gram(s) Oral once PRN Blood Glucose LESS THAN 70 milliGRAM(s)/deciliter  glucagon  Injectable 1 milliGRAM(s) IntraMuscular once PRN Glucose LESS THAN 70 milligrams/deciliter  hydrALAZINE Injectable 10 milliGRAM(s) IV Push every 6 hours PRN SBP > 160      LABS:                  RADIOLOGY & ADDITIONAL STUDIES:      IMPRESSION & PLAN:    Dizziness- cardiology concerning for severe AS. Awaing CTS eval.  Doubt primary CNS issue- MRI pending

## 2019-07-22 NOTE — PROGRESS NOTE ADULT - PROBLEM SELECTOR PLAN 1
Peak aortic valve gradient is 34.   Continue planning for TAVR  Patient is from [place] and should follow up with Gus Gutierrez MD of [facility] in [facility].   Patient should not drive or travel for at least 18 hours   Should monitor patient - if patient continues to be asymptomatic then patient may pursue TAVR treatment with primary cardiologist in [place].     Right groin with 5Fr Arterial access  Sheath pull now   HOB up at 20:00  OOB at 21:00  Bedrest x 5 hours total   resume diet  Frequent neurovascular assessments and reassessments   Patient may return to floor  Further disposition as per Jaquan MARIA Peak aortic valve gradient is 34.   Continue planning for TAVR  Patient is from [place] and should follow up with Gus Gutierrez MD of [facility] in [facility].   Patient should not drive or travel for at least 18 hours   Should monitor patient - if patient continues to be asymptomatic then patient may pursue TAVR treatment with primary cardiologist in [place].     Right groin with 5Fr Arterial access  Sheath pull now   HOB up at 20:00  OOB at 21:00  Bedrest x 5 hours total   resume diet  Frequent neurovascular assessments and reassessments   Patient may return to floor  Further disposition as per Jaquan MARIA  Site Check in AM

## 2019-07-22 NOTE — DISCHARGE NOTE NURSING/CASE MANAGEMENT/SOCIAL WORK - NSDCDPATPORTLINK_GEN_ALL_CORE
You can access the [facility] Patient Portal, offered by [facility], by registering with the following website: [email or web address]

## 2019-07-22 NOTE — PROGRESS NOTE ADULT - SUBJECTIVE AND OBJECTIVE BOX
CC:  Patient is a 83y old  Male who presents with a chief complaint of near syncope (2019 11:20)    HPI:  83M presented with complaints of persistent dizziness. The patient reported that he has had intermittent episodes of dizziness which is mostly when he gets up from bed. The symptoms usually resolves with time and the patient is able to continue with his daily activities. Most recently he has had persistent dizziness which did not resolve. The patient notes that the dose of metoprolol was increased about one months prior which was about the time that the symptoms began. He denied any associated chest pain or palpitations. He has had no episodes of syncope or falls. The patient is unaware of any aggravating or relieving factors. He denied any orthopnea, lower extremity edema, or dyspnea on exertion. In the emergency department, the patient was noted have bradycardia as well as orthostatic changes on examination. The patient was placed in observation overnight but had persistent symptoms and thought to warrant admission to the hospital.    Upon further discussion with the patient's wife, the patient had been noting that the dizziness had been taking longer to resolve with each episode over the past month. (2019 16:08)      ROS: All review of systems negative unless indicated otherwise below.     Lab Results:  Complete Blood Count (19 @ 11:05)    WBC Count: 7.12 K/uL    RBC Count: 3.55 M/uL    Hemoglobin: 11.8 g/dL    Hematocrit: 34.3 %    Mean Cell Volume: 96.6 fl    Mean Cell Hemoglobin: 33.2 pg    Mean Cell Hemoglobin Conc: 34.4 gm/dL    Red Cell Distrib Width: 12.3 %    Platelet Count - Automated: 232 K/uL    Comprehensive Metabolic Panel (19 @ 11:05)    Sodium, Serum: 141 mmol/L    Potassium, Serum: 4.6 mmol/L    Chloride, Serum: 106 mmol/L    Carbon Dioxide, Serum: 24.0 mmol/L    Anion Gap, Serum: 11 mmol/L    Blood Urea Nitrogen, Serum: 27.0 mg/dL    Creatinine, Serum: 1.06 mg/dL    Glucose, Serum: 148 mg/dL    Calcium, Total Serum: 9.8 mg/dL    Protein Total, Serum: 6.6 g/dL    Albumin, Serum: 4.0 g/dL    Bilirubin Total, Serum: 0.4 mg/dL    Alkaline Phosphatase, Serum: 75 U/L    Aspartate Aminotransferase (AST/SGOT): 17 U/L    Alanine Aminotransferase (ALT/SGPT): 12 U/L    eGFR if Non : 65: Interpretative comment  The units for eGFR are mL/min/1.73M2 (normalized body surface area). The  eGFR is calculated from a serum creatinine using the CKD-EPI equation.  Other variables required for calculation are race, age and sex. Among  patients with chronic kidney disease (CKD), the eGFR is useful in  determining the stage of disease according to KDOQI CKD classification.  All eGFR results are reported numerically with the following  interpretation.          GFR                    With                 Without     (ml/min/1.73 m2)    Kidney Damage       Kidney Damage        >= 90                    Stage 1                     Normal        60-89                    Stage 2                     Decreased GFR        30-59     Stage 3                     Stage 3        15-29                    Stage 4                     Stage 4        < 15                      Stage 5                     Stage 5  Each stage of CKD assumes that the associated GFR level has been in  effect for at least 3 months. Determination of stages one and two (with  eGFR > 59 ml/min/m2) requires estimation of kidney damage for at least 3  months as defined by structural or functional abnormalities.  Limitations: All estimates of GFR will be less accurate for patients at  extremes of muscle mass (including but not limited to frail elderly,  critically ill, or cancer patients), those with unusual diets, and those  with conditions associated with reduced secretion or extrarenal  elimination of creatinine. The eGFR equation is not recommended for use  in patients with unstable creatinine levels. mL/min/1.73M2    eGFR if African American: 76 mL/min/1.73M2    Prothrombin Time and INR, Plasma (19 @ 11:05)    Prothrombin Time, Plasma: 11.0: Effective 2018 the reference range for PT has changed. sec    INR: 0.96: RECOMMENDED RANGES FOR THERAPEUTIC INR:    2.0-3.0 for most medical and surgical thromboembolic states    2.0-3.0 for atrial fibrillation    2.0-3.0 for bileaflet mechanical valve in aortic position    2.5-3.5 for mechanical heart valves   Chest 2004;[id number]  The presence of direct thrombin inhibitors (argatroban, refludan)  may falsely increase results. ratio    Activated Partial Thromboplastin Time (19 @ 11:05)    Activated Partial Thromboplastin Time: 30.9: The recommended therapeutic heparin range (full dose) is 58-99 seconds.  Recommended therapeutic Argatroban range is 1.5 to 3.0 times the baseline  APTT value, not to exceed 100 seconds. Recommended therapeutic Refludan  range is 1.5 to 2.5 times thebaseline APTT.  Effective 2018 the reference range has changed. sec    ECHO  < from: TTE Echo Complete w/Doppler (19 @ 08:29) >  2D AND M-MODE MEASUREMENTS (normal ranges within parentheses):  Left                 Normal   Aorta/Left            Normal  Ventricle:                 Atrium:  IVSd (2D):    1.14  (0.7-1.1) Left Atrium  4.12 cm (1.9-4.0)                 cm             (2D):  LVPWd (2D):   1.20  (0.7-1.1) LA Volume     27.5                 cm             Index         ml/m²  LVIDd (2D):   4.71  (3.4-5.7)                 cm  LVIDs (2D):   2.89                 cm  LV FS (2D):   38.6   (>25%)                  %  Relative Wall 0.51   (<0.42)  Thickness    LV SYSTOLIC FUNCTION BY 2D PLANIMETRY (MOD):  EF-Biplane: 48 %    LV DIASTOLIC FUNCTION:  MV Peak E: 0.50 m/s  MV Peak A: 0.78 m/s  E/A Ratio: 0.64    SPECTRAL DOPPLER ANALYSIS (where applicable):  Aortic Valve: AoV Max Edin: 3.11 m/s AoV Peak P.6 mmHg AoV Mean P.4 mmHg    LVOT Vmax: 0.64 m/s LVOT VTI: 0.176 m LVOT Diameter: 2.15 cm    AoV Area, Vmax: 0.74 cm² AoV Area, VTI: 0.71 cm² AoV Area, Vmn: 0.65 cm²  Ao VTI: 0.897  Tricuspid Valve and PA/RV Systolic Pressure: TR Max Velocity: 2.89 m/s RA   Pressure: 3 mmHg RVSP/PASP: 36.4 mmHg       PHYSICIAN INTERPRETATION:  Left Ventricle: Endocardial visualization was enhanced with intravenous   echo contrast. The left ventricular internal cavity size is normal.  Global LV systolic function was normal. Left ventricular ejection   fraction, by visual estimation, is 55 to 60%. Spectral Doppler shows   impaired relaxation pattern of left ventricular myocardial filling (Grade   I diastolic dysfunction).  Right Ventricle: Normal right ventricular size and function. The right   ventricular size is normal. RV systolic function is normal.  Left Atrium: Normal left atrial size.  Right Atrium: Normal right atrial size.  Pericardium: There is no evidence of pericardial effusion.  Mitral Valve: Mild thickening of the anterior and posterior mitral valve   leaflets. Mild mitral valve regurgitation is seen.  Tricuspid Valve: Mild tricuspid regurgitation is visualized. Estimated   pulmonary artery systolic pressure is 36.4 mmHg assuming a right atrial   pressure of 3 mmHg, which is consistent with borderline pulmonary   hypertension.  Aortic Valve: Sclerotic aortic valve with decreased opening. Peak   transaortic gradient equals 38.6 mmHg, mean transaortic gradient equals   25.4 mmHg, the calculated aortic valve area equals 0.71 cm² by the   continuity equation consistent with severe aortic stenosis. The peak   aortic velocity was obtained from the apical view. No evidence of aortic   valve regurgitation is seen. Low velocity, low gradient aortic stenosis.   The Dimesionless Index value is 0.2.  Pulmonic Valve: The pulmonic valve was not well visualized.  Aorta: The aorta was not well visualized.  Pulmonary Artery: The pulmonary artery is not well seen.  Venous: The inferior vena cava was normal sized, with respiratory size   variation greater than 50%.    < end of copied text >  --------------------------------------------------------------------------------------  PREVIOUS CATH REPORT - TRANSCRIBED (paraphrased): Official Report on paper chart  2/15/17 Right and Left Heart Cath  RFA - Angioseal     Hemodynamics:   1. Right atrial A-wave 6, v-wave 7, mean of 4, RVSP 30, RVDP 7  2. PASP 27, PADP 9  3. PCWP 12  4. CO 4.5  5. EDP 12  6 Aortic valve - 10mm gradient noted    Conclusions:   1. pLMCA 100% occluded   2. oRCA 80% - 2.75 x 16mm Synergy TAMMY intervention  3. SVG to dRCA is totally occluded  4. SVG to dLCX is patent   5. LIMA-mLAD has mild disease  6. Normal Left vent systolic function.     Physicial Plan:   "The patient does not appear to have any significant aortic stenosis and this can be followed with an echocardiogram periodically"     <end of paraphrased transcription of 2/15/19 Cath report from Gus Gutierrez MD in [place]>  ---------------------------------------------------------------------  MEDICATIONS  (STANDING):  amLODIPine   Tablet 5 milliGRAM(s) Oral daily  aspirin enteric coated 81 milliGRAM(s) Oral daily  atorvastatin 10 milliGRAM(s) Oral at bedtime  brimonidine 0.2% Ophthalmic Solution 1 Drop(s) Both EYES two times a day  clopidogrel Tablet 75 milliGRAM(s) Oral daily  dextrose 5%. 1000 milliLiter(s) IV Continuous <Continuous>  dextrose 50% Injectable 12.5 Gram(s) IV Push once  dextrose 50% Injectable 25 Gram(s) IV Push once  dextrose 50% Injectable 25 Gram(s) IV Push once  enoxaparin Injectable 40 milliGRAM(s) SubCutaneous daily  insulin lispro (HumaLOG) corrective regimen sliding scale   SubCutaneous three times a day before meals  pantoprazole    Tablet 40 milliGRAM(s) Oral before breakfast  timolol 0.5% Solution 1 Drop(s) Both EYES two times a day    MEDICATIONS  (PRN):  dextrose 40% Gel 15 Gram(s) Oral once PRN Blood Glucose LESS THAN 70 milliGRAM(s)/deciliter  glucagon  Injectable 1 milliGRAM(s) IntraMuscular once PRN Glucose LESS THAN 70 milligrams/deciliter  hydrALAZINE Injectable 10 milliGRAM(s) IV Push every 6 hours PRN SBP > 160    PHYSICAL EXAM:  Vital Signs Last 24 Hrs  T(C): 36.5 (2019 14:29), Max: 36.6 (2019 23:54)  T(F): 97.7 (2019 14:29), Max: 97.9 (2019 23:54)  HR: 51 (2019 14:29) (48 - 70)  BP: 180/72 (2019 14:29) (139/65 - 180/72)  BP(mean): 99 (2019 12:24) (91 - 99)  RR: 18 (2019 14:29) (16 - 18)  SpO2: 100% (2019 14:29) (96% - 100%)  GENERAL: NAD, well-groomed, well-developed  HEAD:  Atraumatic, Normocephalic  NECK: Supple, No JVD  NERVOUS SYSTEM:  Alert & Oriented X3, Good concentration; Motor Strength 5/5 B/L upper and lower extremities, sensation intact  CHEST/LUNG: Clear to auscultation bilaterally, No rales, rhonchi, wheezing, or rubs, grade II/VI RSB systolic crescendo-decrescendo murmur.   HEART: Regular rate and rhythm; s1 and s2 auscultated, No murmurs, rubs, or gallops  ABDOMEN: Soft, Nontender, Nondistended; Bowel sounds present and normoactive.   EXTREMITIES:  2+ Peripheral Pulses, No clubbing, cyanosis, or edema  SKIN: No rashes or lesions  CATH SITE: Right groin benign s/p cath.  No bleeding, no ecchymosis, no hematoma. Extremity Warm to touch, with palpable distal pulses, and brisk capillary refill.

## 2019-07-22 NOTE — PROGRESS NOTE ADULT - ASSESSMENT
83M presented with complaints of persistent dizziness. The patient reported that he has had intermittent episodes of dizziness which is mostly when he gets up from bed. The symptoms usually resolves with time and the patient is able to continue with his daily activities. Most recently he has had persistent dizziness which did not resolve. The patient notes that the dose of metoprolol was increased about one months prior which was about the time that the symptoms began. He denied any associated chest pain or palpitations. He has had no episodes of syncope or falls. The patient is unaware of any aggravating or relieving factors. He denied any orthopnea, lower extremity edema, or dyspnea on exertion. In the emergency department, the patient was noted have bradycardia as well as orthostatic changes on examination. The patient was placed in observation overnight but had persistent symptoms and thought to warrant admission to the hospital. Upon further discussion with the patient's wife, the patient had been noting that the dizziness had been taking longer to resolve with each episode over the past month. Patient underwent echo which showed severe aortic stenosis and previous had a cath in 2017 which did not have severe AS. Patient on physical exam does have a systolic murmur II/VI RSB but patient claims to have had this murmur since childhood. Patient now s/p LHC w/ patent SVG to LCX and patent mLAD, SVG to RCA is totally occluded as noted on previous cath. Left ventricular systolic function is preserved. Peak aortic valve gradient is 34 consistent with Severe AS.

## 2019-07-23 DIAGNOSIS — I10 ESSENTIAL (PRIMARY) HYPERTENSION: ICD-10-CM

## 2019-07-23 DIAGNOSIS — I25.10 ATHEROSCLEROTIC HEART DISEASE OF NATIVE CORONARY ARTERY WITHOUT ANGINA PECTORIS: ICD-10-CM

## 2019-07-23 DIAGNOSIS — E11.9 TYPE 2 DIABETES MELLITUS WITHOUT COMPLICATIONS: ICD-10-CM

## 2019-07-23 LAB
ANION GAP SERPL CALC-SCNC: 9 MMOL/L — SIGNIFICANT CHANGE UP (ref 5–17)
BUN SERPL-MCNC: 25 MG/DL — HIGH (ref 8–20)
CALCIUM SERPL-MCNC: 9.5 MG/DL — SIGNIFICANT CHANGE UP (ref 8.6–10.2)
CHLORIDE SERPL-SCNC: 105 MMOL/L — SIGNIFICANT CHANGE UP (ref 98–107)
CO2 SERPL-SCNC: 25 MMOL/L — SIGNIFICANT CHANGE UP (ref 22–29)
CREAT SERPL-MCNC: 1.13 MG/DL — SIGNIFICANT CHANGE UP (ref 0.5–1.3)
GLUCOSE BLDC GLUCOMTR-MCNC: 120 MG/DL — HIGH (ref 70–99)
GLUCOSE BLDC GLUCOMTR-MCNC: 134 MG/DL — HIGH (ref 70–99)
GLUCOSE BLDC GLUCOMTR-MCNC: 91 MG/DL — SIGNIFICANT CHANGE UP (ref 70–99)
GLUCOSE SERPL-MCNC: 190 MG/DL — HIGH (ref 70–115)
POTASSIUM SERPL-MCNC: 4.1 MMOL/L — SIGNIFICANT CHANGE UP (ref 3.5–5.3)
POTASSIUM SERPL-SCNC: 4.1 MMOL/L — SIGNIFICANT CHANGE UP (ref 3.5–5.3)
SODIUM SERPL-SCNC: 139 MMOL/L — SIGNIFICANT CHANGE UP (ref 135–145)

## 2019-07-23 PROCEDURE — 99232 SBSQ HOSP IP/OBS MODERATE 35: CPT

## 2019-07-23 PROCEDURE — 99221 1ST HOSP IP/OBS SF/LOW 40: CPT

## 2019-07-23 PROCEDURE — 99233 SBSQ HOSP IP/OBS HIGH 50: CPT

## 2019-07-23 PROCEDURE — 70551 MRI BRAIN STEM W/O DYE: CPT | Mod: 26

## 2019-07-23 RX ORDER — ATORVASTATIN CALCIUM 80 MG/1
40 TABLET, FILM COATED ORAL AT BEDTIME
Refills: 0 | Status: DISCONTINUED | OUTPATIENT
Start: 2019-07-23 | End: 2019-07-24

## 2019-07-23 RX ORDER — ACETYLCYSTEINE 200 MG/ML
600 VIAL (ML) MISCELLANEOUS
Refills: 0 | Status: DISCONTINUED | OUTPATIENT
Start: 2019-07-23 | End: 2019-07-24

## 2019-07-23 RX ADMIN — AMLODIPINE BESYLATE 5 MILLIGRAM(S): 2.5 TABLET ORAL at 12:11

## 2019-07-23 RX ADMIN — ATORVASTATIN CALCIUM 40 MILLIGRAM(S): 80 TABLET, FILM COATED ORAL at 21:12

## 2019-07-23 RX ADMIN — Medication 81 MILLIGRAM(S): at 12:11

## 2019-07-23 RX ADMIN — BRIMONIDINE TARTRATE 1 DROP(S): 2 SOLUTION/ DROPS OPHTHALMIC at 17:36

## 2019-07-23 RX ADMIN — CLOPIDOGREL BISULFATE 75 MILLIGRAM(S): 75 TABLET, FILM COATED ORAL at 12:11

## 2019-07-23 RX ADMIN — PANTOPRAZOLE SODIUM 40 MILLIGRAM(S): 20 TABLET, DELAYED RELEASE ORAL at 06:28

## 2019-07-23 RX ADMIN — BRIMONIDINE TARTRATE 1 DROP(S): 2 SOLUTION/ DROPS OPHTHALMIC at 06:27

## 2019-07-23 RX ADMIN — ENOXAPARIN SODIUM 40 MILLIGRAM(S): 100 INJECTION SUBCUTANEOUS at 12:11

## 2019-07-23 RX ADMIN — Medication 1 DROP(S): at 17:36

## 2019-07-23 RX ADMIN — Medication 1 DROP(S): at 06:27

## 2019-07-23 RX ADMIN — Medication 600 MILLIGRAM(S): at 21:20

## 2019-07-23 NOTE — PROGRESS NOTE ADULT - SUBJECTIVE AND OBJECTIVE BOX
INTERVAL HISTORY:    episode of light headendes and slurred last night in setting of hypotension. All resolved    VITAL SIGNS:  Vital Signs Last 24 Hrs  T(C): 36.5 (23 Jul 2019 08:33), Max: 36.9 (23 Jul 2019 00:16)  T(F): 97.7 (23 Jul 2019 08:33), Max: 98.5 (23 Jul 2019 00:16)  HR: 56 (23 Jul 2019 08:00) (48 - 70)  BP: 136/59 (23 Jul 2019 08:00) (108/57 - 193/79)  BP(mean): 79 (23 Jul 2019 08:00) (52 - 99)  RR: 18 (23 Jul 2019 08:00) (16 - 22)  SpO2: 100% (23 Jul 2019 08:00) (95% - 100%)    PHYSICAL EXAMINATION:    Mentation:  nl  Language/Speech: nl  CN: nl  Visual Fields: full  Motor: nl  Sensory:  DTR:  Babinski:      MEDS:  MEDICATIONS  (STANDING):  amLODIPine   Tablet 5 milliGRAM(s) Oral daily  aspirin enteric coated 81 milliGRAM(s) Oral daily  atorvastatin 40 milliGRAM(s) Oral at bedtime  brimonidine 0.2% Ophthalmic Solution 1 Drop(s) Both EYES two times a day  clopidogrel Tablet 75 milliGRAM(s) Oral daily  dextrose 5%. 1000 milliLiter(s) (50 mL/Hr) IV Continuous <Continuous>  dextrose 50% Injectable 12.5 Gram(s) IV Push once  dextrose 50% Injectable 25 Gram(s) IV Push once  dextrose 50% Injectable 25 Gram(s) IV Push once  enoxaparin Injectable 40 milliGRAM(s) SubCutaneous daily  insulin lispro (HumaLOG) corrective regimen sliding scale   SubCutaneous three times a day before meals  pantoprazole    Tablet 40 milliGRAM(s) Oral before breakfast  timolol 0.5% Solution 1 Drop(s) Both EYES two times a day    MEDICATIONS  (PRN):  dextrose 40% Gel 15 Gram(s) Oral once PRN Blood Glucose LESS THAN 70 milliGRAM(s)/deciliter  glucagon  Injectable 1 milliGRAM(s) IntraMuscular once PRN Glucose LESS THAN 70 milligrams/deciliter      LABS:                  RADIOLOGY & ADDITIONAL STUDIES:      IMPRESSION & PLAN:    Dizziness/ pre-syncope  severe AS for TAVR  Doubt primary CNS event/   MR pending

## 2019-07-23 NOTE — CONSULT NOTE ADULT - ASSESSMENT
83y Male presents with Dizziness, apparent TIA during Cardiac Catheterization, unable to speak, ?TIA, CT Head negative, MR pending.  Pt with severe AS, no significant new cardiac dx, SVG to LAD and Cx patent.      Plan for TAVR with Dr. Cabrera August 2 pending completion of work up.  TAVR CTA ordered and to be completed prior to pt d/c.  Plan for readmission on August 2 for TAVR.    Case d/w Cardiac Surgery team and agrees with above.      E/M TIME SPENT:   38 minutes of noncontinuous time spent   Evaluating/treating patient, reviewing data/labs/imaging, discussing case with multidisciplinary team, discussing plan/goals of care with patient/family about patient's condition . Non-inclusive of procedure time.   greater than 50% of time spent educating patient about condition, medication and prognosis. 83y Male presents with Dizziness, apparent TIA during Cardiac Catheterization, unable to speak, ?TIA, CT Head negative, MR pending.  Pt with severe AS, no significant new cardiac dx, SVG to LAD and Cx patent.      Plan for TAVR with Dr. Cabrera August 2 pending completion of work up.  TAVR CTA ordered and to be completed prior to pt d/c.  Plan for readmission on August 2 for TAVR.    Case d/w Cardiac Surgery team and agrees with above.      E/M TIME SPENT:   48 minutes of noncontinuous time spent   Evaluating/treating patient, reviewing data/labs/imaging, discussing case with multidisciplinary team, discussing plan/goals of care with patient/family about patient's condition . Non-inclusive of procedure time.   greater than 50% of time spent educating patient about condition, medication and prognosis.

## 2019-07-23 NOTE — PROGRESS NOTE ADULT - ASSESSMENT
83M with recurrent episodes of dizziness worsening over the past month found to have bradycardia and aortic stenosis.    Dizziness - Noted to have orthostatic changes on examination. Status post intravenous fluid resuscitation. Cardiology and Neurology consultation noted. MRI today shows punctate acute/subacute left frontal lobe infarct.    Aortic stenosis - Consulted by cardiology, had diagnostic cath yesterday, was Code Stroke in cath lab due to expressive aphasia. MRI w small CVA as above. Pt is planned for TAVR w Dr Cabrera on 8/3/19. Will be discharged and readmitted on 8/2/19. Scheduled  for pre-TAVR CTA tomorrow am.     Bradycardia - Sinus bradycardia on EKG. Metoprolol decreased. Telemetry monitor reviewed, bradycardia improved.     Hypertension - Close blood pressure monitoring. Amlodipine was started 7/20. Cont careful BP monitoring. Will allow a bit of permissive HTN due to new CVA. If BP remains elevated can increase Amlodipine to 10 mg once out of 24 hour window. .     Carotid stenosis - CT angiogram and carotid doppler results noted. Vascular Surgery consultation noted. No intervention recommended at this time.    CAD - On aspirin and clopidogrel. No evidence of acute ischemia.    Diabetes - Insulin coverage, close monitoring of blood glucose levels. Have been stable.     DISPO: Pt w acute CVA on MRI s/p Code Stroke in cath lab. Pt planned for TAVR on 8/3/19 w Dr Cabrera. Pt may be able to be discharged tomorrow after cleared by neurology and after pre TAVR CTA.

## 2019-07-23 NOTE — PROGRESS NOTE ADULT - SUBJECTIVE AND OBJECTIVE BOX
CHIEF COMPLAINT:Patient is a 83y old  Male who presents with a chief complaint of dizziness (2019 17:42)    INTERVAL HISTORY:  pt with cath, LIMA to LAD patent, SVG to Lcx patent, SVG to RCA occluded.  Pt with severe AS.   TIA, unable to speak, CT scan was negative.  MR is pending.     Allergies  No Known Allergies  	  MEDICATIONS:  amLODIPine   Tablet 5 milliGRAM(s) Oral daily  hydrALAZINE Injectable 10 milliGRAM(s) IV Push every 6 hours PRN  pantoprazole    Tablet 40 milliGRAM(s) Oral before breakfast  atorvastatin 10 milliGRAM(s) Oral at bedtime  dextrose 40% Gel 15 Gram(s) Oral once PRN  dextrose 50% Injectable 12.5 Gram(s) IV Push once  dextrose 50% Injectable 25 Gram(s) IV Push once  dextrose 50% Injectable 25 Gram(s) IV Push once  glucagon  Injectable 1 milliGRAM(s) IntraMuscular once PRN  insulin lispro (HumaLOG) corrective regimen sliding scale   SubCutaneous three times a day before meals  aspirin enteric coated 81 milliGRAM(s) Oral daily  brimonidine 0.2% Ophthalmic Solution 1 Drop(s) Both EYES two times a day  clopidogrel Tablet 75 milliGRAM(s) Oral daily  dextrose 5%. 1000 milliLiter(s) IV Continuous <Continuous>  enoxaparin Injectable 40 milliGRAM(s) SubCutaneous daily  timolol 0.5% Solution 1 Drop(s) Both EYES two times a day    PHYSICAL EXAM:  T(C): 36.5 (19 @ 08:33), Max: 36.9 (19 @ 00:16)  HR: 56 (19 @ 08:00) (48 - 70)  BP: 136/59 (19 @ 08:00) (108/57 - 193/79)  RR: 18 (19 @ 08:00) (16 - 22)  SpO2: 100% (19 @ 08:00) (95% - 100%)  I&O's Summary    2019 07:01  -  2019 07:00  --------------------------------------------------------  IN: 0 mL / OUT: 625 mL / NET: -625 mL    2019 07:01  -  2019 09:07  --------------------------------------------------------  IN: 140 mL / OUT: 0 mL / NET: 140 mL    Daily Height in cm: 177.8 (2019 23:08)    Daily Weight in k.4 (2019 05:39)    Appearance: Normal	  HEENT:   NC/AT  Eye: Pink Conjunctiva  Lungs: CTA B/L  CVS: RRR, Normal S1, no s2, 3/6 sm lsb  Pulses: Normal distal pulses.  Neuro: A&O x3    TELEMETRY: no events    ASSESSMENT/PLAN: 	  Pt with severe AS  Spoke to his cardiologist from [place]  Pt is partial to have TAVR done here  MRI of brain today  Increase statin  If family decides to have TAVR in [place], I will call CT surgery for them.

## 2019-07-23 NOTE — PROGRESS NOTE ADULT - SUBJECTIVE AND OBJECTIVE BOX
VALDEMAR HODGE Male 83y [id number]    Patient is a 83y old  Male who presents with a chief complaint of Dizziness (23 Jul 2019 14:27)      Subjective/objective:  Pt seen and examined at bedside, no over night event reported by night staff. Pt reports doing well and has no new complaints.     Review of system:  No fever, chills, nausea, vomiting, headache, dizziness, chest pain, SOB or palpitation.      PHYSICAL EXAM:    Vital Signs Last 24 Hrs  T(C): 36.8 (23 Jul 2019 16:27), Max: 37 (23 Jul 2019 13:47)  T(F): 98.2 (23 Jul 2019 16:27), Max: 98.6 (23 Jul 2019 13:47)  HR: 55 (23 Jul 2019 12:00) (49 - 70)  BP: 148/52 (23 Jul 2019 12:00) (108/57 - 193/79)  BP(mean): 74 (23 Jul 2019 12:00) (52 - 79)  RR: 18 (23 Jul 2019 12:00) (16 - 22)  SpO2: 100% (23 Jul 2019 12:00) (95% - 100%)    GENERAL: Pt lying comfortably, NAD.  ENMT: PERRL, +EOMI.  NECK: soft, Supple, No JVD,   CHEST/LUNG: Clear to auscultation bilaterally; No wheezing.  HEART: S1S2+, Regular rate and rhythm; No murmurs.  ABDOMEN: Soft, Nontender, Nondistended; Bowel sounds present.  MUSCULOSKELETAL: Normal range of motion.  SKIN: No rashes or lesions.  NEURO: AAOX3, no focal deficits, no motor r sensory loss.  PSYCH: normal mood.          MEDICATIONS  (STANDING):  acetylcysteine  Oral Solution 600 milliGRAM(s) Oral two times a day  amLODIPine   Tablet 5 milliGRAM(s) Oral daily  aspirin enteric coated 81 milliGRAM(s) Oral daily  atorvastatin 40 milliGRAM(s) Oral at bedtime  brimonidine 0.2% Ophthalmic Solution 1 Drop(s) Both EYES two times a day  clopidogrel Tablet 75 milliGRAM(s) Oral daily  dextrose 5%. 1000 milliLiter(s) (50 mL/Hr) IV Continuous <Continuous>  dextrose 50% Injectable 12.5 Gram(s) IV Push once  dextrose 50% Injectable 25 Gram(s) IV Push once  dextrose 50% Injectable 25 Gram(s) IV Push once  enoxaparin Injectable 40 milliGRAM(s) SubCutaneous daily  insulin lispro (HumaLOG) corrective regimen sliding scale   SubCutaneous three times a day before meals  pantoprazole    Tablet 40 milliGRAM(s) Oral before breakfast  timolol 0.5% Solution 1 Drop(s) Both EYES two times a day    MEDICATIONS  (PRN):  dextrose 40% Gel 15 Gram(s) Oral once PRN Blood Glucose LESS THAN 70 milliGRAM(s)/deciliter  glucagon  Injectable 1 milliGRAM(s) IntraMuscular once PRN Glucose LESS THAN 70 milligrams/deciliter        Labs:  LABS:    07-23    139  |  105  |  25.0<H>  ----------------------------<  190<H>  4.1   |  25.0  |  1.13    Ca    9.5      23 Jul 2019 10:39        TEST RESULTS:       MRI head 7/23/19  IMPRESSION:  No acute intracranial hemorrhage.    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 Pt reports doing well and has no new complaints.     Review of system:  No fever, chills, nausea, vomiting, headache, dizziness, chest pain, SOB or palpitation.      PHYSICAL EXAM:    Vital Signs Last 24 Hrs  T(C): 36.8 (23 Jul 2019 16:27), Max: 37 (23 Jul 2019 13:47)  T(F): 98.2 (23 Jul 2019 16:27), Max: 98.6 (23 Jul 2019 13:47)  HR: 55 (23 Jul 2019 12:00) (49 - 70)  BP: 148/52 (23 Jul 2019 12:00) (108/57 - 193/79)  BP(mean): 74 (23 Jul 2019 12:00) (52 - 79)  RR: 18 (23 Jul 2019 12:00) (16 - 22)  SpO2: 100% (23 Jul 2019 12:00) (95% - 100%)    GENERAL: Pt lying comfortably, NAD.  ENMT: PERRL, +EOMI.  NECK: soft, Supple, No JVD,   CHEST/LUNG: Clear to auscultation bilaterally; No wheezing.  HEART: S1S2+, Regular rate and rhythm; No murmurs.  ABDOMEN: Soft, Nontender, Nondistended; Bowel sounds present.  MUSCULOSKELETAL: Normal range of motion.  SKIN: No rashes or lesions.  NEURO: AAOX3, no focal deficits, no motor r sensory loss.  PSYCH: normal mood.          MEDICATIONS  (STANDING):  acetylcysteine  Oral Solution 600 milliGRAM(s) Oral two times a day  amLODIPine   Tablet 5 milliGRAM(s) Oral daily  aspirin enteric coated 81 milliGRAM(s) Oral daily  atorvastatin 40 milliGRAM(s) Oral at bedtime  brimonidine 0.2% Ophthalmic Solution 1 Drop(s) Both EYES two times a day  clopidogrel Tablet 75 milliGRAM(s) Oral daily  dextrose 5%. 1000 milliLiter(s) (50 mL/Hr) IV Continuous <Continuous>  dextrose 50% Injectable 12.5 Gram(s) IV Push once  dextrose 50% Injectable 25 Gram(s) IV Push once  dextrose 50% Injectable 25 Gram(s) IV Push once  enoxaparin Injectable 40 milliGRAM(s) SubCutaneous daily  insulin lispro (HumaLOG) corrective regimen sliding scale   SubCutaneous three times a day before meals  pantoprazole    Tablet 40 milliGRAM(s) Oral before breakfast  timolol 0.5% Solution 1 Drop(s) Both EYES two times a day    MEDICATIONS  (PRN):  dextrose 40% Gel 15 Gram(s) Oral once PRN Blood Glucose LESS THAN 70 milliGRAM(s)/deciliter  glucagon  Injectable 1 milliGRAM(s) IntraMuscular once PRN Glucose LESS THAN 70 milligrams/deciliter        Labs:  LABS:    07-23    139  |  105  |  25.0<H>  ----------------------------<  190<H>  4.1   |  25.0  |  1.13    Ca    9.5      23 Jul 2019 10:39        TEST RESULTS:       MRI head 7/23/19  IMPRESSION:  No acute intracranial hemorrhage.    Punctate acute/subacute infarct high left posterior medial frontal lobe

## 2019-07-23 NOTE — CONSULT NOTE ADULT - SUBJECTIVE AND OBJECTIVE BOX
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    HPI:  83M presented with complaints of persistent dizziness. The patient reported that he has had intermittent episodes of dizziness which is mostly when he gets up from bed. The symptoms usually resolves with time and the patient is able to continue with his daily activities. Most recently he has had persistent dizziness which did not resolve. The patient notes that the dose of metoprolol was increased about one months prior which was about the time that the symptoms began. He denied any associated chest pain or palpitations. He has had no episodes of syncope or falls. The patient is unaware of any aggravating or relieving factors. He denied any orthopnea, lower extremity edema, or dyspnea on exertion. In the emergency department, the patient was noted have bradycardia as well as orthostatic changes on examination. The patient was placed in observation overnight but had persistent symptoms and thought to warrant admission to the hospital.    Upon further discussion with the patient's wife, the patient had been noting that the dizziness had been taking longer to resolve with each episode over the past month. (21 Jul 2019 16:08)      PAST MEDICAL & SURGICAL HISTORY:  DM2 (diabetes mellitus, type 2)  CAD (coronary artery disease)  HTN (hypertension)  S/P CABG (coronary artery bypass graft)  Macular Degeneration b/l  Hyperlipidemia    PAST SURGICAL HISTORY:  Appendectomy  Cholecystectomy  B/L Partial Knee Replacements      MEDICATIONS  (STANDING):  acetylcysteine  Oral Solution 600 milliGRAM(s) Oral two times a day  amLODIPine   Tablet 5 milliGRAM(s) Oral daily  aspirin enteric coated 81 milliGRAM(s) Oral daily  atorvastatin 40 milliGRAM(s) Oral at bedtime  brimonidine 0.2% Ophthalmic Solution 1 Drop(s) Both EYES two times a day  clopidogrel Tablet 75 milliGRAM(s) Oral daily  dextrose 5%. 1000 milliLiter(s) (50 mL/Hr) IV Continuous <Continuous>  dextrose 50% Injectable 12.5 Gram(s) IV Push once  dextrose 50% Injectable 25 Gram(s) IV Push once  dextrose 50% Injectable 25 Gram(s) IV Push once  enoxaparin Injectable 40 milliGRAM(s) SubCutaneous daily  insulin lispro (HumaLOG) corrective regimen sliding scale   SubCutaneous three times a day before meals  pantoprazole    Tablet 40 milliGRAM(s) Oral before breakfast  timolol 0.5% Solution 1 Drop(s) Both EYES two times a day    MEDICATIONS  (PRN):  dextrose 40% Gel 15 Gram(s) Oral once PRN Blood Glucose LESS THAN 70 milliGRAM(s)/deciliter  glucagon  Injectable 1 milliGRAM(s) IntraMuscular once PRN Glucose LESS THAN 70 milligrams/deciliter    Antiplatelet therapy:                           Last dose/amt:    Allergies: No Known Allergies      SOCIAL HISTORY:  Smoker: [ ] Yes  [x ] No        PACK YEARS:     10                    WHEN QUIT?  40 yrs ago  ETOH use: [ ] Yes  [x ] No              FREQUENCY / QUANTITY:  Ilicit Drug use:  [ ] Yes  [ x] No  Occupation:  Retired, works part time at [employer], maintains grounds  Live with:  wife  Assist device use:  None    Relevant Family History  FAMILY HISTORY:  None      Review of Systems  GENERAL:  Fevers[] chills[] sweats[] fatigue[] weight loss[] weight gain []                                      [x ] NEGATIVE  NEURO:  parathesias[] seizures []  syncope []  confusion []                                                                [x ] NEGATIVE                 EYES: glasses[]  blurry vision[]  discharge[] pain[] glaucoma [] Macular degeneration [x]                  [ ] NEGATIVE                 ENMT:  difficulty hearing []  vertigo[]  dysphagia[] epistaxis[] recent dental work []                     [ x NEGATIVE                 CV:  chest pain[] palpitations[] ELIZONDO [] diaphoresis [] edema[]                                                           [ x] NEGATIVE                                 RESPIRATORY:  wheezing[] SOB[] cough [] sputum[] hemoptysis[]                                                   [ x] NEGATIVE               GI:  nausea[]  vomiting [x]  diarrhea occ with dizziness [] constipation [] melena []                            [ ] NEGATIVE            : hematuria[ ]  dysuria[ ] urgency[] incontinence[]                                                                          [ ] NEGATIVE                    MUSKULOSKELETAL:  arthritis[ ]  joint swelling [ ] muscle weakness [ ]                                            [ ] NEGATIVE                     SKIN/BREAST:  rash[ ] itching [ ]  hair loss[ ] masses[ ]                                                                          [ ] NEGATIVE                     PSYCH:  dementia [ ] depression [ ] anxiety[ ]                                                                                          [ ] NEGATIVE                        HEME/LYMPH:  bruises easily[ ] enlarged lymph nodes[ ] tender lymph nodes[ ]                           [ ] NEGATIVE                      ENDOCRINE:  cold intolerance[ ] heat intolerance[ ] polydipsia[ ]                                                      [ ] NEGATIVE                            PHYSICAL EXAM  General: Well nourished, well developed, no acute distress.                                                         Neuro: Normal exam oriented to person/place & time with no focal motor or sensory  deficits.                     ENT: Normal exam of nasal/oral mucosa with absence of cyanosis.   Neck: Normal exam of jugular veins, trachea & thyroid.   Chest: Normal lung exam with good air movement absence of wheezes, rales, or rhonchi:                                                                          CV:  Auscultation: normal [ ] S3[ ] S4[ ] Irregular [ ] Rub[ ] Clicks[ ]  Murmurs none:[ x]systolic [ ]  diastolic [ ] holosystolic   Abdomen:  soft, nontender, nondistended, positive bowel sounds  Extremities:  warm, well perfused, no edema, +DP pulses                                                                           Vital Signs Last 24 Hrs  T(C): 37 (23 Jul 2019 13:47), Max: 37 (23 Jul 2019 13:47)  T(F): 98.6 (23 Jul 2019 13:47), Max: 98.6 (23 Jul 2019 13:47)  HR: 55 (23 Jul 2019 12:00) (49 - 70)  BP: 148/52 (23 Jul 2019 12:00) (108/57 - 193/79)  BP(mean): 74 (23 Jul 2019 12:00) (52 - 79)  RR: 18 (23 Jul 2019 12:00) (16 - 22)  SpO2: 100% (23 Jul 2019 12:00) (95% - 100%)                                                          LABS:    07-23    139  |  105  |  25.0<H>  ----------------------------<  190<H>  4.1   |  25.0  |  1.13    Ca    9.5      23 Jul 2019 10:39                  Cardiac Cath:    < from: Cardiac Cath Lab - Adult (07.22.19 @ 14:58) >  DIAGNOSTIC IMPRESSIONS: Totally occluded LM  Patent mid RCA stent with moderate proximal RCA stenosis.  Patent MASTERS to LAD  Patent SVG to OM3 and retrogradely filling then whole CX system ( mid 80%  CX stenosis)  Known occluded SVG to RCA ( cath 2017)  Peak to peak gradient on pullback across the aortic valve is 27 mmHg (  likely reflecting moderate aortic stenosis )  Elevated left sided filling pressure ( LVEDP 20 mmHg)  DIAGNOSTIC RECOMMENDATIONS: continue medical therapy for CAD.  Evaluation for AVR ( surgical vs TAVR) based on echo results ( severe low  flow, low gradient aortic stenosis)  Prepared and signed by  Víctor Harrington MD  Signed 07/22/2019 17:34:51    < end of copied text >      TTE / TEGAN:    < from: TTE Echo Complete w/Doppler (07.22.19 @ 08:29) >  Summary:   1. Technically fair study.   2. Endocardial visualization was enhanced with intravenous echo contrast.   3. Normal global left ventricular systolic function.   4. Left ventricular ejection fraction, by visual estimation, is 55 to   60%.   5. Spectral Doppler shows impaired relaxation pattern of left   ventricular myocardial filling (Grade I diastolic dysfunction).   6. There is mild left ventricular hypertrophy.   7. There is no evidence of pericardial effusion.   8. Mild mitral valve regurgitation.   9. Mild thickening of the anterior and posterior mitral valve leaflets.  10. Low velocity, low gradient aortic stenosis.  11. Sclerotic aortic valve with decreased opening.  12. Peak transaortic gradient equals 38.6 mmHg, mean transaortic gradient   equals 25.4 mmHg, the calculated aortic valve area equals 0.71 cm² by the   continuity equation consistent with severe aortic stenosis.  13. Estimated pulmonary artery systolic pressure is 36.4 mmHg assuming a   right atrial pressure of 3 mmHg, whichis consistent with borderline   pulmonary hypertension.    MD Wolf Electronically signed on 7/22/2019 at 10:14:18 AM    < end of copied text >

## 2019-07-24 ENCOUNTER — TRANSCRIPTION ENCOUNTER (OUTPATIENT)
Age: 84
End: 2019-07-24

## 2019-07-24 VITALS — TEMPERATURE: 99 F

## 2019-07-24 LAB
CHOLEST SERPL-MCNC: 139 MG/DL — SIGNIFICANT CHANGE UP (ref 110–199)
GLUCOSE BLDC GLUCOMTR-MCNC: 117 MG/DL — HIGH (ref 70–99)
GLUCOSE BLDC GLUCOMTR-MCNC: 127 MG/DL — HIGH (ref 70–99)
GLUCOSE BLDC GLUCOMTR-MCNC: 151 MG/DL — HIGH (ref 70–99)
HDLC SERPL-MCNC: 39 MG/DL — LOW
LIPID PNL WITH DIRECT LDL SERPL: 82 MG/DL — SIGNIFICANT CHANGE UP
TOTAL CHOLESTEROL/HDL RATIO MEASUREMENT: 4 RATIO — SIGNIFICANT CHANGE UP (ref 3.4–9.6)
TRIGL SERPL-MCNC: 92 MG/DL — SIGNIFICANT CHANGE UP (ref 10–200)
TSH SERPL-MCNC: 6.27 UIU/ML — HIGH (ref 0.27–4.2)

## 2019-07-24 PROCEDURE — 93306 TTE W/DOPPLER COMPLETE: CPT

## 2019-07-24 PROCEDURE — 71045 X-RAY EXAM CHEST 1 VIEW: CPT

## 2019-07-24 PROCEDURE — 82962 GLUCOSE BLOOD TEST: CPT

## 2019-07-24 PROCEDURE — 83735 ASSAY OF MAGNESIUM: CPT

## 2019-07-24 PROCEDURE — 99239 HOSP IP/OBS DSCHRG MGMT >30: CPT

## 2019-07-24 PROCEDURE — 99232 SBSQ HOSP IP/OBS MODERATE 35: CPT

## 2019-07-24 PROCEDURE — 85730 THROMBOPLASTIN TIME PARTIAL: CPT

## 2019-07-24 PROCEDURE — 93005 ELECTROCARDIOGRAM TRACING: CPT

## 2019-07-24 PROCEDURE — 70551 MRI BRAIN STEM W/O DYE: CPT

## 2019-07-24 PROCEDURE — 93880 EXTRACRANIAL BILAT STUDY: CPT

## 2019-07-24 PROCEDURE — 99285 EMERGENCY DEPT VISIT HI MDM: CPT | Mod: 25

## 2019-07-24 PROCEDURE — 99231 SBSQ HOSP IP/OBS SF/LOW 25: CPT

## 2019-07-24 PROCEDURE — 84484 ASSAY OF TROPONIN QUANT: CPT

## 2019-07-24 PROCEDURE — 96374 THER/PROPH/DIAG INJ IV PUSH: CPT | Mod: XU

## 2019-07-24 PROCEDURE — 81001 URINALYSIS AUTO W/SCOPE: CPT

## 2019-07-24 PROCEDURE — 83036 HEMOGLOBIN GLYCOSYLATED A1C: CPT

## 2019-07-24 PROCEDURE — 97167 OT EVAL HIGH COMPLEX 60 MIN: CPT

## 2019-07-24 PROCEDURE — G0378: CPT

## 2019-07-24 PROCEDURE — 80053 COMPREHEN METABOLIC PANEL: CPT

## 2019-07-24 PROCEDURE — 74174 CTA ABD&PLVS W/CONTRAST: CPT | Mod: 26

## 2019-07-24 PROCEDURE — 94010 BREATHING CAPACITY TEST: CPT

## 2019-07-24 PROCEDURE — 80048 BASIC METABOLIC PNL TOTAL CA: CPT

## 2019-07-24 PROCEDURE — 74174 CTA ABD&PLVS W/CONTRAST: CPT

## 2019-07-24 PROCEDURE — 85027 COMPLETE CBC AUTOMATED: CPT

## 2019-07-24 PROCEDURE — 84443 ASSAY THYROID STIM HORMONE: CPT

## 2019-07-24 PROCEDURE — 70498 CT ANGIOGRAPHY NECK: CPT

## 2019-07-24 PROCEDURE — 71275 CT ANGIOGRAPHY CHEST: CPT | Mod: 26

## 2019-07-24 PROCEDURE — 80061 LIPID PANEL: CPT

## 2019-07-24 PROCEDURE — 83880 ASSAY OF NATRIURETIC PEPTIDE: CPT

## 2019-07-24 PROCEDURE — 71275 CT ANGIOGRAPHY CHEST: CPT

## 2019-07-24 PROCEDURE — 70450 CT HEAD/BRAIN W/O DYE: CPT

## 2019-07-24 PROCEDURE — 36415 COLL VENOUS BLD VENIPUNCTURE: CPT

## 2019-07-24 PROCEDURE — 85610 PROTHROMBIN TIME: CPT

## 2019-07-24 PROCEDURE — 70496 CT ANGIOGRAPHY HEAD: CPT

## 2019-07-24 RX ORDER — HYDRALAZINE HCL 50 MG
10 TABLET ORAL ONCE
Refills: 0 | Status: COMPLETED | OUTPATIENT
Start: 2019-07-24 | End: 2019-07-24

## 2019-07-24 RX ORDER — CLOPIDOGREL BISULFATE 75 MG/1
1 TABLET, FILM COATED ORAL
Qty: 0 | Refills: 0 | DISCHARGE

## 2019-07-24 RX ORDER — LOVASTATIN 20 MG
1 TABLET ORAL
Qty: 0 | Refills: 0 | DISCHARGE

## 2019-07-24 RX ORDER — ASPIRIN/CALCIUM CARB/MAGNESIUM 324 MG
1 TABLET ORAL
Qty: 30 | Refills: 0
Start: 2019-07-24 | End: 2019-08-22

## 2019-07-24 RX ORDER — METOPROLOL TARTRATE 50 MG
1 TABLET ORAL
Qty: 0 | Refills: 0 | DISCHARGE

## 2019-07-24 RX ORDER — LOVASTATIN 20 MG
1 TABLET ORAL
Qty: 30 | Refills: 0
Start: 2019-07-24 | End: 2019-08-22

## 2019-07-24 RX ORDER — OMEPRAZOLE 10 MG/1
1 CAPSULE, DELAYED RELEASE ORAL
Qty: 0 | Refills: 0 | DISCHARGE

## 2019-07-24 RX ORDER — CLOPIDOGREL BISULFATE 75 MG/1
1 TABLET, FILM COATED ORAL
Qty: 30 | Refills: 0
Start: 2019-07-24 | End: 2019-08-22

## 2019-07-24 RX ORDER — AMLODIPINE BESYLATE 2.5 MG/1
10 TABLET ORAL ONCE
Refills: 0 | Status: COMPLETED | OUTPATIENT
Start: 2019-07-24 | End: 2019-07-24

## 2019-07-24 RX ORDER — AMLODIPINE BESYLATE 2.5 MG/1
5 TABLET ORAL ONCE
Refills: 0 | Status: COMPLETED | OUTPATIENT
Start: 2019-07-24 | End: 2019-07-24

## 2019-07-24 RX ORDER — METFORMIN HYDROCHLORIDE 850 MG/1
1 TABLET ORAL
Qty: 60 | Refills: 0
Start: 2019-07-24 | End: 2019-08-22

## 2019-07-24 RX ORDER — METFORMIN HYDROCHLORIDE 850 MG/1
1 TABLET ORAL
Qty: 0 | Refills: 0 | DISCHARGE

## 2019-07-24 RX ORDER — OMEPRAZOLE 10 MG/1
1 CAPSULE, DELAYED RELEASE ORAL
Qty: 30 | Refills: 0
Start: 2019-07-24 | End: 2019-08-22

## 2019-07-24 RX ORDER — BRIMONIDINE TARTRATE, TIMOLOL MALEATE 2; 5 MG/ML; MG/ML
1 SOLUTION/ DROPS OPHTHALMIC
Qty: 0 | Refills: 0 | DISCHARGE

## 2019-07-24 RX ORDER — AMLODIPINE BESYLATE 2.5 MG/1
1 TABLET ORAL
Qty: 30 | Refills: 0
Start: 2019-07-24 | End: 2019-08-22

## 2019-07-24 RX ORDER — BRIMONIDINE TARTRATE, TIMOLOL MALEATE 2; 5 MG/ML; MG/ML
1 SOLUTION/ DROPS OPHTHALMIC
Qty: 1 | Refills: 0
Start: 2019-07-24 | End: 2019-08-22

## 2019-07-24 RX ADMIN — Medication 1 DROP(S): at 06:24

## 2019-07-24 RX ADMIN — Medication 1: at 16:39

## 2019-07-24 RX ADMIN — ENOXAPARIN SODIUM 40 MILLIGRAM(S): 100 INJECTION SUBCUTANEOUS at 11:20

## 2019-07-24 RX ADMIN — AMLODIPINE BESYLATE 5 MILLIGRAM(S): 2.5 TABLET ORAL at 11:21

## 2019-07-24 RX ADMIN — PANTOPRAZOLE SODIUM 40 MILLIGRAM(S): 20 TABLET, DELAYED RELEASE ORAL at 06:24

## 2019-07-24 RX ADMIN — BRIMONIDINE TARTRATE 1 DROP(S): 2 SOLUTION/ DROPS OPHTHALMIC at 06:24

## 2019-07-24 RX ADMIN — Medication 10 MILLIGRAM(S): at 16:37

## 2019-07-24 RX ADMIN — Medication 81 MILLIGRAM(S): at 11:21

## 2019-07-24 RX ADMIN — AMLODIPINE BESYLATE 5 MILLIGRAM(S): 2.5 TABLET ORAL at 14:33

## 2019-07-24 RX ADMIN — CLOPIDOGREL BISULFATE 75 MILLIGRAM(S): 75 TABLET, FILM COATED ORAL at 11:21

## 2019-07-24 NOTE — PHYSICAL THERAPY INITIAL EVALUATION ADULT - ADDITIONAL COMMENTS
pt reporting that he lives in [place] but will be staying with his son here in [place]. pt will be on the second floor with a full flight and a single hand rail. Pt does not use an AD.

## 2019-07-24 NOTE — DISCHARGE NOTE PROVIDER - PROVIDER TOKENS
PROVIDER:[TOKEN:[[id number]]],PROVIDER:[TOKEN:[[id number]]] PROVIDER:[TOKEN:[[id number]]],PROVIDER:[TOKEN:[[id number]]],PROVIDER:[TOKEN:[[id number]]]

## 2019-07-24 NOTE — OCCUPATIONAL THERAPY INITIAL EVALUATION ADULT - PERTINENT HX OF CURRENT PROBLEM, REHAB EVAL
Pt presents with c/o lightheadedness x one month. Bilateral Carotid reveals 52 to 69% stenosis left internal carotid artery by peak systolic velocity criteria. CTA reveals no acute findingsl; bilateral ICA stenosis. MRI head reveals no acute intracranial hemorrhage; punctate acute/subacute infarct high left posterior medial frontal lobe.

## 2019-07-24 NOTE — DISCHARGE NOTE PROVIDER - HOSPITAL COURSE
83M with PMH CAD, HTN, HLD, DM, presented w recurrent episodes of dizziness worsening over the past month found to have bradycardia and aortic stenosis.        Pt was noted to have orthostatic changes on examination. Dizzinesss improved s/p IVF. Cardiology, Neurology and CTSx followed the patient.  MRI showed punctate acute/subacute left frontal lobe infarct. TEGAN showed aortic stenosis.     Cardiology was consulted for AS and bradycardia. Metoprolol discontinued by cardiology. Telemetry monitor reviewed, bradycardia improved. Started on Amlodipine for HTN. Pt had diagnostic cath 7/23, was Code Stroke in cath lab due to expressive aphasia. MRI w small CVA as above. Neurology signed off on the patient. Pt is back at baseline without neurologic deficit. Pt is planned for TAVR w Dr Cabrera on 8/2/19. Will be discharged and readmitted on 8/2/19. Had pre-op TAVR CTA performed today. Will be read within a few days and followed up by CTSx.     Pt was noted w carotid stenosis on CTA and doppler. Vascular Surgery was consulted and determined no intervention recommended at this time.    Plan is for bowennet to call Cts[facility] office [phone] within 1-2 days and the office will schedule PST appointment. Pt will receive further instruction as to when to stop DAPT treatment. Pt can continue with all other home medications as directed.          PHYSICAL EXAM:     GENERAL: Pt lying comfortably, NAD.    ENMT: PERRL, +EOMI.    NECK: soft, Supple, No JVD,     CHEST/LUNG: Clear to auscultation bilaterally; No wheezing.    HEART: S1S2+, Regular rate and rhythm; No murmurs.    ABDOMEN: Soft, Nontender, Nondistended; Bowel sounds present.    MUSCULOSKELETAL: Normal range of motion.    SKIN: No rashes or lesions.    NEURO: AAOX3, no focal deficits, no motor or sensory loss.    PSYCH: normal mood.        Pt expresses understanding and agreement with plan of discharge and follow up as outlined above. Patient has had the opportunity to ask questions and all questions have been answered, Patient is in no acute distress at discharge and vital signs are stable. Patient is advised to return to the ED immediately with any worsening or worrisome symptoms.         45 minutes spent on discharge summary and plan. 83M with PMH CAD, HTN, HLD, DM, presented w recurrent episodes of dizziness worsening over the past month found to have bradycardia and aortic stenosis.        Pt was noted to have orthostatic changes on examination. Dizzinesss improved s/p IVF. Cardiology, Neurology and CTSx followed the patient.  MRI showed punctate acute/subacute left frontal lobe infarct. TEGAN showed aortic stenosis.     Cardiology was consulted for AS and bradycardia. Metoprolol discontinued by cardiology. Telemetry monitor reviewed, bradycardia improved. Started on Amlodipine for HTN. Pt had diagnostic cath 7/23, was Code Stroke in cath lab due to expressive aphasia. MRI w small CVA as above. Neurology signed off on the patient. Pt is back at baseline without neurologic deficit. Pt is planned for TAVR w Dr Cabrera on 8/2/19. Will be discharged and readmitted on 8/2/19. Had pre-op TAVR CTA performed today. Will be read within a few days and followed up by CTSx.     Pt was noted w carotid stenosis on CTA and doppler. Vascular Surgery was consulted and determined no intervention recommended at this time.    Plan is for nathalie to call Ctsx office [phone] within 1-2 days and the office will schedule PST appointment. Pt will receive further instruction as to when to stop DAPT treatment. Pt can continue with all other home medications as directed.          PHYSICAL EXAM:     GENERAL: Pt lying comfortably, NAD.    ENMT: PERRL, +EOMI.    NECK: soft, Supple, No JVD,     CHEST/LUNG: Clear to auscultation bilaterally; No wheezing.    HEART: S1S2+, Regular rate and rhythm; No murmurs.    ABDOMEN: Soft, Nontender, Nondistended; Bowel sounds present.    MUSCULOSKELETAL: Normal range of motion.    SKIN: No rashes or lesions.    NEURO: AAOX3, no focal deficits, no motor or sensory loss.    PSYCH: normal mood.        seen and examined at bedside. denied complaints. exam NAD. stable for DC. spoke to radiologist regarding CTA TAVR protocol. suggested it will take     couple of days. CTS to f/u CTA and discuss with the patient. BP elevated. would prefer to keep BP around 150-160 for severe AS. cardiology agrees. discussed plan of care with PA, RN, patient wife and patient at bedside. return precautions discussed. Pt expresses understanding and agreement with plan of discharge and follow up as outlined above. Patient has had the opportunity to ask questions and all questions have been answered, Patient is in no acute distress at discharge and vital signs are stable. Patient is advised to return to the ED immediately with any worsening or worrisome symptoms.         45 minutes spent on discharge summary and plan.

## 2019-07-24 NOTE — PROGRESS NOTE ADULT - SUBJECTIVE AND OBJECTIVE BOX
INTERVAL HISTORY:  stable      VITAL SIGNS:  Vital Signs Last 24 Hrs  T(C): 37.1 (24 Jul 2019 05:00), Max: 37.1 (24 Jul 2019 05:00)  T(F): 98.8 (24 Jul 2019 05:00), Max: 98.8 (24 Jul 2019 05:00)  HR: 62 (24 Jul 2019 04:11) (53 - 62)  BP: 140/85 (24 Jul 2019 04:11) (115/50 - 148/52)  BP(mean): 95 (24 Jul 2019 04:11) (65 - 95)  RR: 18 (24 Jul 2019 04:11) (18 - 20)  SpO2: 97% (24 Jul 2019 04:11) (97% - 100%)    PHYSICAL EXAMINATION:    Mentation:  nl   speech/lang- ml  CN: nl  Visual Fields: full  Motor: nl, gait nl age  Sensory:  DTR:  Babinski:      MEDS:  MEDICATIONS  (STANDING):  acetylcysteine  Oral Solution 600 milliGRAM(s) Oral two times a day  amLODIPine   Tablet 5 milliGRAM(s) Oral daily  aspirin enteric coated 81 milliGRAM(s) Oral daily  atorvastatin 40 milliGRAM(s) Oral at bedtime  brimonidine 0.2% Ophthalmic Solution 1 Drop(s) Both EYES two times a day  clopidogrel Tablet 75 milliGRAM(s) Oral daily  dextrose 5%. 1000 milliLiter(s) (50 mL/Hr) IV Continuous <Continuous>  dextrose 50% Injectable 12.5 Gram(s) IV Push once  dextrose 50% Injectable 25 Gram(s) IV Push once  dextrose 50% Injectable 25 Gram(s) IV Push once  enoxaparin Injectable 40 milliGRAM(s) SubCutaneous daily  insulin lispro (HumaLOG) corrective regimen sliding scale   SubCutaneous three times a day before meals  pantoprazole    Tablet 40 milliGRAM(s) Oral before breakfast  timolol 0.5% Solution 1 Drop(s) Both EYES two times a day    MEDICATIONS  (PRN):  dextrose 40% Gel 15 Gram(s) Oral once PRN Blood Glucose LESS THAN 70 milliGRAM(s)/deciliter  glucagon  Injectable 1 milliGRAM(s) IntraMuscular once PRN Glucose LESS THAN 70 milligrams/deciliter      LABS:      07-23    139  |  105  |  25.0<H>  ----------------------------<  190<H>  4.1   |  25.0  |  1.13    Ca    9.5      23 Jul 2019 10:39            RADIOLOGY & ADDITIONAL STUDIES:      < from: MR Head No Cont (07.23.19 @ 15:59) >  IMPRESSION:  No acute intracranial hemorrhage.    Punctate acute/subacute infarct high left posterior medial frontal lobe      < end of copied text >\    IMPRESSION & PLAN:    Tiny  acute infarct seen on MR.  No focal deficit on exam.   Sever AS Scheculed for TAVE on 8/1.    REC:  Antiplatelet therapy as prescribed.  Further management as per cardioology- TAVR  Will not actively follow.   Neurologically cleared for discharge/disposition.  Please recontact as needed.

## 2019-07-24 NOTE — DISCHARGE NOTE PROVIDER - CARE PROVIDER_API CALL
Wilber Cabrera)  Surgery; Thoracic and Cardiac Surgery  [address] [place] [address]  Phone: [phone]  Fax: [phone]  Follow Up Time:     Reji Cat)  Neurology  [address] [place] [address]  Phone: [phone]  Fax: [phone]  Follow Up Time: Wilber Cabrera)  Surgery; Thoracic and Cardiac Surgery  [address] [place] [address]  Phone: [phone]  Fax: [phone]  Follow Up Time:     Reji Cat)  Neurology  [address] [place] [address]  Phone: [phone]  Fax: [phone]  Follow Up Time:     Kun Partida)  Cardiovascular Disease  [address] [facility], [address]  [place] [address]  Phone: [phone]  Fax: [phone]  Follow Up Time:

## 2019-07-24 NOTE — DISCHARGE NOTE PROVIDER - NSDCCPCAREPLAN_GEN_ALL_CORE_FT
PRINCIPAL DISCHARGE DIAGNOSIS  Diagnosis: Dizziness  Assessment and Plan of Treatment: Secondary to aortic stenosis. Plan is for TAVR. Call CT Surgery office in 1-2 days to schedule your  presurgical testing appointment. They will give you further instruction on which day to discontinue aspirin and Plavix.      SECONDARY DISCHARGE DIAGNOSES  Diagnosis: CVA (cerebrovascular accident)  Assessment and Plan of Treatment: You had a small stroke. Take medications as prescribed and follow up w neurology within 1-2 weeks.    Diagnosis: Bradycardia  Assessment and Plan of Treatment: Plan as above. Metoprolol discontinued. Take Amlodipine for HTN. PRINCIPAL DISCHARGE DIAGNOSIS  Diagnosis: Dizziness  Assessment and Plan of Treatment: Secondary to aortic stenosis. Plan is for TAVR. Call CT Surgery office in 1-2 days to schedule your  presurgical testing appointment. They will give you further instruction on which day to discontinue aspirin and Plavix. follow up with cardiology in 1 week      SECONDARY DISCHARGE DIAGNOSES  Diagnosis: Hypertension  Assessment and Plan of Treatment: please talke meds and DASh/TLC diet. see cardiology in 1 week    Diagnosis: CVA (cerebrovascular accident)  Assessment and Plan of Treatment: You had a small stroke. Take medications as prescribed and follow up w neurology within 1-2 weeks.    Diagnosis: Bradycardia  Assessment and Plan of Treatment: Plan as above. Metoprolol discontinued. Take Amlodipine for HTN.

## 2019-07-24 NOTE — CONSULT NOTE ADULT - PROBLEM SELECTOR PROBLEM 1
Severe aortic stenosis by prior echocardiogram
Lightheadedness
Severe aortic stenosis by prior echocardiogram

## 2019-07-24 NOTE — OCCUPATIONAL THERAPY INITIAL EVALUATION ADULT - ADDITIONAL COMMENTS
Pt lives in [place] and is visiting son; pt will be discharge to son's house. Son's house has 3 [address] and 12 steps inside up to bedroom and bathroom. Bathroom has shower stall with curtains. Pt does not own any DME. Pt is right handed. Pt drives. Pt's wife is able and available to assist upon discharge; wife drives.

## 2019-07-24 NOTE — DISCHARGE NOTE PROVIDER - NSDCQMCOGNITION_NEU_ALL_CORE
No difficulties/Difficulty concentrating/Difficulty making decisions/Difficulty remembering No difficulties/Difficulty making decisions

## 2019-07-24 NOTE — CONSULT NOTE ADULT - CONSULT REQUESTED DATE/TIME
20-Jul-2019 21:42
21-Jul-2019 10:01
21-Jul-2019 13:39
22-Jul-2019 18:30
23-Jul-2019 11:30
24-Jul-2019 11:14

## 2019-07-24 NOTE — DISCHARGE NOTE PROVIDER - CARE PROVIDERS DIRECT ADDRESSES
[email or web address] [email or web address],DirectAddress_Unknown,[email or web address]

## 2019-07-24 NOTE — CONSULT NOTE ADULT - PROBLEM SELECTOR RECOMMENDATION 2
as above
check Orthostatic BP - Tele  reduce beta blocker dose
Cath reveals SVG to LAD and Cx patent  Continue all cardiac meds, including ASA  Discuss with Cardiac team when to hold Plavix prior to TAVR

## 2019-07-24 NOTE — CONSULT NOTE ADULT - PROBLEM SELECTOR RECOMMENDATION 9
as above
SIVAN 0.7 cm on most recent echo  TAVR CTA ordered  Plan for TAVR August 2, 2019 with Dr. Cabrera, pt to be d/c'd to son's house and return electively for surgery.

## 2019-07-24 NOTE — CONSULT NOTE ADULT - SUBJECTIVE AND OBJECTIVE BOX
SUBJECTIVE        HPI   83M presented with complaints of persistent dizziness. The patient reported that he has had intermittent episodes of dizziness which is mostly when he gets up from bed. The symptoms usually resolves with time and the patient is able to continue with his daily activities. Most recently he has had persistent dizziness which did not resolve. The patient notes that the dose of metoprolol was increased about one months prior which was about the time that the symptoms began. He denied any associated chest pain or palpitations. He has had no episodes of syncope or falls. The patient is unaware of any aggravating or relieving factors. He denied any orthopnea, lower extremity edema, or dyspnea on exertion. In the emergency department, the patient was noted have bradycardia as well as orthostatic changes on examination. The patient was placed in observation overnight but had persistent symptoms and thought to warrant admission to the hospital.    Upon further discussion with the patient's wife, the patient had been noting that the dizziness had been taking longer to resolve with each episode over the past month. (21 Jul 2019 16:08)    LAST BLOOD THINNER--> aspirin enteric coated   81 milliGRAM(s) Oral (07-23-19 @ 12:11)   81 milliGRAM(s) Oral (07-22-19 @ 12:34)   81 milliGRAM(s) Oral (07-21-19 @ 17:13)    clopidogrel Tablet   75 milliGRAM(s) Oral (07-23-19 @ 12:11)   75 milliGRAM(s) Oral (07-22-19 @ 12:34)   75 milliGRAM(s) Oral (07-21-19 @ 14:19)    enoxaparin Injectable   40 milliGRAM(s) SubCutaneous (07-23-19 @ 12:11)   40 milliGRAM(s) SubCutaneous (07-22-19 @ 12:34)   40 milliGRAM(s) SubCutaneous (07-21-19 @ 17:34)        SOCIAL HISTORY   patient lives with ; stairs; assist devices  smoking history   drinking history   previous occupation     PAST MEDICAL / SURGICAL HISTORY   PAST MEDICAL & SURGICAL HISTORY:  DM2 (diabetes mellitus, type 2)  CAD (coronary artery disease)  HTN (hypertension)  S/P CABG (coronary artery bypass graft)    HOME MEDICATIONS  Home Medications:  clopidogrel 75 mg oral tablet: 1 tab(s) orally once a day (21 Jul 2019 16:46)  Combigan 0.2%-0.5% ophthalmic solution: 1 drop(s) to each affected eye every 12 hours (21 Jul 2019 16:46)  lovastatin 40 mg oral tablet: 1 tab(s) orally once a day (21 Jul 2019 16:46)  metFORMIN 500 mg oral tablet: 1 tab(s) orally 2 times a day (21 Jul 2019 16:46)  metoprolol tartrate 25 mg oral tablet: 1 tab(s) orally 2 times a day (21 Jul 2019 16:46)  omeprazole 20 mg oral delayed release tablet: 1 tab(s) orally once a day (21 Jul 2019 16:46)      ALLERGIES  Allergies    No Known Allergies    Intolerances        OBJECTIVE DATA    VITALS   currently in sinus rhythm  ICU Vital Signs Last 24 Hrs  T(C): 37.1 (24 Jul 2019 05:00), Max: 37.1 (24 Jul 2019 05:00)  T(F): 98.8 (24 Jul 2019 05:00), Max: 98.8 (24 Jul 2019 05:00)  HR: 72 (24 Jul 2019 08:00) (53 - 72)  BP: 150/83 (24 Jul 2019 08:00) (115/50 - 150/83)  BP(mean): 100 (24 Jul 2019 08:00) (65 - 100)  ABP: --  ABP(mean): --  RR: 16 (24 Jul 2019 08:00) (16 - 20)  SpO2: 100% (24 Jul 2019 08:00) (97% - 100%)      LABS   07-23    139  |  105  |  25.0<H>  ----------------------------<  190<H>  4.1   |  25.0  |  1.13    Ca    9.5      23 Jul 2019 10:39          INTAKE/OUTAKE  I&O's Summary    23 Jul 2019 07:01  -  24 Jul 2019 07:00  --------------------------------------------------------  IN: 580 mL / OUT: 950 mL / NET: -370 mL              CATH   < from: Cardiac Cath Lab - Adult (07.22.19 @ 14:58) >  VALVES: AORTIC VALVE: The aortic valve was evaluated by hemodynamic  measurement, P2P gradient 27 mmHg. There was moderate aortic stenosis.  CORONARY VESSELS: The coronary circulation is right dominant.  LM:   --  Ostial LM: There was a 100 % stenosis.  LAD:   --  LAD: The artery was supplied by a patent bypass graft.  --  Mid LAD: Angiography showed no evidence of disease.  --  Distal LAD: The vessel was small to medium sized. Angiography showed no  evidence of disease.  CX:   --  Mid circumflex: The artery was supplied by retrograde flow from a  graft (OM3 graft) There was a diffuse 80 % stenosis.  --  OM3: The vessel was medium sized. The artery was supplied by a patent  bypass graft.  RCA:   --  Proximal RCA: There was a tubular 50 % stenosis.  --  Mid RCA: patent stent.  --  RPDA: The vessel was medium sized. Angiography showed mild  atherosclerosis with no flow limiting lesions.  GRAFTS:   --  Graft to the LAD: The graft was a large sized MASTERS. Graft  angiography showed no evidence of disease.  --  Graft to the 3rd obtuse marginal: The graft was a large sized saphenous  vein graft from the ascending aorta. Graft angiography showed no evidence  of disease.    < end of copied text >      ECHO   < from: TTE Echo Complete w/Doppler (07.22.19 @ 08:29) >  Summary:   1. Technically fair study.   2. Endocardial visualization was enhanced with intravenous echo contrast.   3. Normal global left ventricular systolic function.   4. Left ventricular ejection fraction, by visual estimation, is 55 to   60%.   5. Spectral Doppler shows impaired relaxation pattern of left   ventricular myocardial filling (Grade I diastolic dysfunction).   6. There is mild left ventricular hypertrophy.   7. There is no evidence of pericardial effusion.   8. Mild mitral valve regurgitation.   9. Mild thickening of the anterior and posterior mitral valve leaflets.  10. Low velocity, low gradient aortic stenosis.  11. Sclerotic aortic valve with decreased opening.  12. Peak transaortic gradient equals 38.6 mmHg, mean transaortic gradient   equals 25.4 mmHg, the calculated aortic valve area equals 0.71 cm² by the   continuity equation consistent with severe aortic stenosis.  13. Estimated pulmonary artery systolic pressure is 36.4 mmHg assuming a   right atrial pressure of 3 mmHg, whichis consistent with borderline   pulmonary hypertension.    < end of copied text > SUBJECTIVE  "wait which team are you from"   patient is found with wife at bedside, laying flat, no acute complaints at this time.    patient and wife educated on current plan; pending CT for access   patient tentatively scheduled for TAVR Aug 2nd   	    HPI   83M presented with complaints of persistent dizziness. The patient reported that he has had intermittent episodes of dizziness which is mostly when he gets up from bed. The symptoms usually resolves with time and the patient is able to continue with his daily activities. Most recently he has had persistent dizziness which did not resolve. The patient notes that the dose of metoprolol was increased about one months prior which was about the time that the symptoms began. He denied any associated chest pain or palpitations. He has had no episodes of syncope or falls. The patient is unaware of any aggravating or relieving factors. He denied any orthopnea, lower extremity edema, or dyspnea on exertion. In the emergency department, the patient was noted have bradycardia as well as orthostatic changes on examination. The patient was placed in observation overnight but had persistent symptoms and thought to warrant admission to the hospital.    Upon further discussion with the patient's wife, the patient had been noting that the dizziness had been taking longer to resolve with each episode over the past month. (21 Jul 2019 16:08)    LAST BLOOD THINNER--> aspirin enteric coated   81 milliGRAM(s) Oral (07-23-19 @ 12:11)   81 milliGRAM(s) Oral (07-22-19 @ 12:34)   81 milliGRAM(s) Oral (07-21-19 @ 17:13)    clopidogrel Tablet   75 milliGRAM(s) Oral (07-23-19 @ 12:11)   75 milliGRAM(s) Oral (07-22-19 @ 12:34)   75 milliGRAM(s) Oral (07-21-19 @ 14:19)    enoxaparin Injectable   40 milliGRAM(s) SubCutaneous (07-23-19 @ 12:11)   40 milliGRAM(s) SubCutaneous (07-22-19 @ 12:34)   40 milliGRAM(s) SubCutaneous (07-21-19 @ 17:34)        SOCIAL HISTORY   patient lives with ; stairs; assist devices  smoking history   drinking history   previous occupation     PAST MEDICAL / SURGICAL HISTORY   PAST MEDICAL & SURGICAL HISTORY:  DM2 (diabetes mellitus, type 2)  CAD (coronary artery disease)  HTN (hypertension)  S/P CABG (coronary artery bypass graft)    HOME MEDICATIONS  Home Medications:  clopidogrel 75 mg oral tablet: 1 tab(s) orally once a day (21 Jul 2019 16:46)  Combigan 0.2%-0.5% ophthalmic solution: 1 drop(s) to each affected eye every 12 hours (21 Jul 2019 16:46)  lovastatin 40 mg oral tablet: 1 tab(s) orally once a day (21 Jul 2019 16:46)  metFORMIN 500 mg oral tablet: 1 tab(s) orally 2 times a day (21 Jul 2019 16:46)  metoprolol tartrate 25 mg oral tablet: 1 tab(s) orally 2 times a day (21 Jul 2019 16:46)  omeprazole 20 mg oral delayed release tablet: 1 tab(s) orally once a day (21 Jul 2019 16:46)      ALLERGIES  Allergies    No Known Allergies    Intolerances        OBJECTIVE DATA    VITALS   currently in sinus rhythm  ICU Vital Signs Last 24 Hrs  T(C): 37.1 (24 Jul 2019 05:00), Max: 37.1 (24 Jul 2019 05:00)  T(F): 98.8 (24 Jul 2019 05:00), Max: 98.8 (24 Jul 2019 05:00)  HR: 72 (24 Jul 2019 08:00) (53 - 72)  BP: 150/83 (24 Jul 2019 08:00) (115/50 - 150/83)  BP(mean): 100 (24 Jul 2019 08:00) (65 - 100)  ABP: --  ABP(mean): --  RR: 16 (24 Jul 2019 08:00) (16 - 20)  SpO2: 100% (24 Jul 2019 08:00) (97% - 100%)      LABS   07-23    139  |  105  |  25.0<H>  ----------------------------<  190<H>  4.1   |  25.0  |  1.13    Ca    9.5      23 Jul 2019 10:39          INTAKE/OUTAKE  I&O's Summary    23 Jul 2019 07:01  -  24 Jul 2019 07:00  --------------------------------------------------------  IN: 580 mL / OUT: 950 mL / NET: -370 mL              CATH   < from: Cardiac Cath Lab - Adult (07.22.19 @ 14:58) >  VALVES: AORTIC VALVE: The aortic valve was evaluated by hemodynamic  measurement, P2P gradient 27 mmHg. There was moderate aortic stenosis.  CORONARY VESSELS: The coronary circulation is right dominant.  LM:   --  Ostial LM: There was a 100 % stenosis.  LAD:   --  LAD: The artery was supplied by a patent bypass graft.  --  Mid LAD: Angiography showed no evidence of disease.  --  Distal LAD: The vessel was small to medium sized. Angiography showed no  evidence of disease.  CX:   --  Mid circumflex: The artery was supplied by retrograde flow from a  graft (OM3 graft) There was a diffuse 80 % stenosis.  --  OM3: The vessel was medium sized. The artery was supplied by a patent  bypass graft.  RCA:   --  Proximal RCA: There was a tubular 50 % stenosis.  --  Mid RCA: patent stent.  --  RPDA: The vessel was medium sized. Angiography showed mild  atherosclerosis with no flow limiting lesions.  GRAFTS:   --  Graft to the LAD: The graft was a large sized MASTERS. Graft  angiography showed no evidence of disease.  --  Graft to the 3rd obtuse marginal: The graft was a large sized saphenous  vein graft from the ascending aorta. Graft angiography showed no evidence  of disease.    < end of copied text >      ECHO   < from: TTE Echo Complete w/Doppler (07.22.19 @ 08:29) >  Summary:   1. Technically fair study.   2. Endocardial visualization was enhanced with intravenous echo contrast.   3. Normal global left ventricular systolic function.   4. Left ventricular ejection fraction, by visual estimation, is 55 to   60%.   5. Spectral Doppler shows impaired relaxation pattern of left   ventricular myocardial filling (Grade I diastolic dysfunction).   6. There is mild left ventricular hypertrophy.   7. There is no evidence of pericardial effusion.   8. Mild mitral valve regurgitation.   9. Mild thickening of the anterior and posterior mitral valve leaflets.  10. Low velocity, low gradient aortic stenosis.  11. Sclerotic aortic valve with decreased opening.  12. Peak transaortic gradient equals 38.6 mmHg, mean transaortic gradient   equals 25.4 mmHg, the calculated aortic valve area equals 0.71 cm² by the   continuity equation consistent with severe aortic stenosis.  13. Estimated pulmonary artery systolic pressure is 36.4 mmHg assuming a   right atrial pressure of 3 mmHg, whichis consistent with borderline   pulmonary hypertension.    < end of copied text >
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
## 2019-07-24 NOTE — PROGRESS NOTE ADULT - SUBJECTIVE AND OBJECTIVE BOX
CHIEF COMPLAINT:Patient is a 83y old  Male who presents with a chief complaint of Dizziness (23 Jul 2019 14:27)    INTERVAL HISTORY:  MRI reviewed.   I think his symptoms on presentation were related to CVA.  Doing better today.  For CT TAVR protocol.    Allergies  No Known Allergies  	  MEDICATIONS:  amLODIPine   Tablet 5 milliGRAM(s) Oral daily  pantoprazole    Tablet 40 milliGRAM(s) Oral before breakfast  atorvastatin 40 milliGRAM(s) Oral at bedtime  dextrose 40% Gel 15 Gram(s) Oral once PRN  dextrose 50% Injectable 12.5 Gram(s) IV Push once  dextrose 50% Injectable 25 Gram(s) IV Push once  dextrose 50% Injectable 25 Gram(s) IV Push once  glucagon  Injectable 1 milliGRAM(s) IntraMuscular once PRN  insulin lispro (HumaLOG) corrective regimen sliding scale   SubCutaneous three times a day before meals  aspirin enteric coated 81 milliGRAM(s) Oral daily  brimonidine 0.2% Ophthalmic Solution 1 Drop(s) Both EYES two times a day  clopidogrel Tablet 75 milliGRAM(s) Oral daily  dextrose 5%. 1000 milliLiter(s) IV Continuous <Continuous>  enoxaparin Injectable 40 milliGRAM(s) SubCutaneous daily  timolol 0.5% Solution 1 Drop(s) Both EYES two times a day    PHYSICAL EXAM:  T(C): 37.1 (07-24-19 @ 05:00), Max: 37.1 (07-24-19 @ 05:00)  HR: 62 (07-24-19 @ 04:11) (53 - 62)  BP: 140/85 (07-24-19 @ 04:11) (115/50 - 148/52)  RR: 18 (07-24-19 @ 04:11) (18 - 20)  SpO2: 97% (07-24-19 @ 04:11) (97% - 100%)  I&O's Summary    23 Jul 2019 07:01  -  24 Jul 2019 07:00  --------------------------------------------------------  IN: 580 mL / OUT: 950 mL / NET: -370 mL  Appearance: Normal	  HEENT:   NC/AT  Eye: Pink Conjunctiva  Lungs: CTA B/L  CVS: RRR, Normal S1, no s2.   Pulses: Normal distal pulses.  Neuro: A&O x3    LABS:	 	  07-23    139  |  105  |  25.0<H>  ----------------------------<  190<H>  4.1   |  25.0  |  1.13    Ca    9.5      23 Jul 2019 10:39  TSH: Thyroid Stimulating Hormone, Serum: 6.27 uIU/mL (07-24 @ 04:47)    ASSESSMENT/PLAN: 	  1. Severe AS.  2. Plan for TAVR.  3. BP slightly elevated, would not increase BB meds with severe AS  4. Small CVa, Dr Cat on the case.  5. FU with Dr Wilber Cabrera. Plan for TAVR.  Spoke with pt and wife. Risk of stroke, complicaitons including need for PPM was d/w them. All their questions were answered.     I will sign off , please call if needed.  Thank you for allowing me to contribute to your patient's care.

## 2019-07-25 PROBLEM — Z00.00 ENCOUNTER FOR PREVENTIVE HEALTH EXAMINATION: Status: ACTIVE | Noted: 2019-07-25

## 2019-07-26 PROBLEM — E11.9 TYPE 2 DIABETES MELLITUS WITHOUT COMPLICATIONS: Chronic | Status: ACTIVE | Noted: 2019-07-20

## 2019-07-26 PROBLEM — Z86.39 HISTORY OF DIABETES MELLITUS: Status: RESOLVED | Noted: 2019-07-26 | Resolved: 2019-07-26

## 2019-07-26 PROBLEM — I25.10 ATHEROSCLEROTIC HEART DISEASE OF NATIVE CORONARY ARTERY WITHOUT ANGINA PECTORIS: Chronic | Status: ACTIVE | Noted: 2019-07-20

## 2019-07-26 PROBLEM — R42 DIZZINESS: Status: ACTIVE | Noted: 2019-07-26

## 2019-07-26 PROBLEM — Z86.79 HISTORY OF HYPERTENSION: Status: RESOLVED | Noted: 2019-07-26 | Resolved: 2019-07-26

## 2019-07-26 PROBLEM — Z86.79 HISTORY OF CORONARY ARTERY DISEASE: Status: RESOLVED | Noted: 2019-07-26 | Resolved: 2019-07-26

## 2019-07-26 PROBLEM — I35.0 AORTIC STENOSIS: Status: ACTIVE | Noted: 2019-07-26

## 2019-07-26 PROBLEM — I10 ESSENTIAL (PRIMARY) HYPERTENSION: Chronic | Status: ACTIVE | Noted: 2019-07-20

## 2019-07-26 RX ORDER — BRIMONIDINE TARTRATE, TIMOLOL MALEATE 2; 5 MG/ML; MG/ML
0.2-0.5 SOLUTION/ DROPS OPHTHALMIC
Refills: 0 | Status: ACTIVE | COMMUNITY

## 2019-07-26 RX ORDER — LOVASTATIN 40 MG/1
40 TABLET ORAL
Refills: 0 | Status: ACTIVE | COMMUNITY

## 2019-07-26 RX ORDER — CLOPIDOGREL 75 MG/1
75 TABLET, FILM COATED ORAL
Refills: 0 | Status: ACTIVE | COMMUNITY

## 2019-07-26 RX ORDER — METFORMIN HYDROCHLORIDE 500 MG/1
500 TABLET, COATED ORAL TWICE DAILY
Refills: 0 | Status: ACTIVE | COMMUNITY

## 2019-07-26 NOTE — ED CDU PROVIDER SUBSEQUENT DAY NOTE - CONSTITUTIONAL, MLM
normal... Well appearing, well nourished, awake, alert, oriented to person, place, time/situation and in no apparent distress. thin appear

## 2019-07-26 NOTE — ED CDU PROVIDER SUBSEQUENT DAY NOTE - MEDICAL DECISION MAKING DETAILS
82 Y/o male pMh of HTn, DM II , CAD S.p CaBG Brought in ER and c.o dizziness intermittent for past month and lucas cardia   called consult cardio and neurology - as per vacular pt can F.u ou pt - pt remain sinus lucas occasional dizziness - pending MRI brain  will admit the opt

## 2019-07-26 NOTE — ED CDU PROVIDER SUBSEQUENT DAY NOTE - HISTORY
82 Y/o male pMh of HTn, DM II , CAD S.p CaBG Brought in ER and c.o dizziness intermittent for past month and lucas cardia . states the dizziness when he tries to walk and standing up from sitting position . he had Cardiology f.u  at [place]  recently increased dose of metoprolol. currently stable pending vascular - cardio and neurology consult

## 2019-07-29 ENCOUNTER — OUTPATIENT (OUTPATIENT)
Dept: OUTPATIENT SERVICES | Facility: HOSPITAL | Age: 84
LOS: 1 days | End: 2019-07-29
Payer: MEDICARE

## 2019-07-29 ENCOUNTER — APPOINTMENT (OUTPATIENT)
Dept: CARDIOTHORACIC SURGERY | Facility: CLINIC | Age: 84
End: 2019-07-29
Payer: MEDICARE

## 2019-07-29 VITALS
HEART RATE: 53 BPM | WEIGHT: 162 LBS | HEIGHT: 70 IN | DIASTOLIC BLOOD PRESSURE: 77 MMHG | OXYGEN SATURATION: 100 % | SYSTOLIC BLOOD PRESSURE: 169 MMHG | BODY MASS INDEX: 23.19 KG/M2 | RESPIRATION RATE: 16 BRPM

## 2019-07-29 VITALS
HEIGHT: 70 IN | RESPIRATION RATE: 16 BRPM | HEART RATE: 72 BPM | DIASTOLIC BLOOD PRESSURE: 78 MMHG | TEMPERATURE: 98 F | WEIGHT: 163.14 LBS | SYSTOLIC BLOOD PRESSURE: 130 MMHG

## 2019-07-29 DIAGNOSIS — Z29.9 ENCOUNTER FOR PROPHYLACTIC MEASURES, UNSPECIFIED: ICD-10-CM

## 2019-07-29 DIAGNOSIS — N20.0 CALCULUS OF KIDNEY: Chronic | ICD-10-CM

## 2019-07-29 DIAGNOSIS — E11.9 TYPE 2 DIABETES MELLITUS WITHOUT COMPLICATIONS: ICD-10-CM

## 2019-07-29 DIAGNOSIS — Z01.818 ENCOUNTER FOR OTHER PREPROCEDURAL EXAMINATION: ICD-10-CM

## 2019-07-29 DIAGNOSIS — Z90.49 ACQUIRED ABSENCE OF OTHER SPECIFIED PARTS OF DIGESTIVE TRACT: Chronic | ICD-10-CM

## 2019-07-29 DIAGNOSIS — Z13.89 ENCOUNTER FOR SCREENING FOR OTHER DISORDER: ICD-10-CM

## 2019-07-29 DIAGNOSIS — Z95.1 PRESENCE OF AORTOCORONARY BYPASS GRAFT: Chronic | ICD-10-CM

## 2019-07-29 DIAGNOSIS — Z96.659 PRESENCE OF UNSPECIFIED ARTIFICIAL KNEE JOINT: Chronic | ICD-10-CM

## 2019-07-29 DIAGNOSIS — I25.10 ATHEROSCLEROTIC HEART DISEASE OF NATIVE CORONARY ARTERY WITHOUT ANGINA PECTORIS: ICD-10-CM

## 2019-07-29 DIAGNOSIS — I35.0 NONRHEUMATIC AORTIC (VALVE) STENOSIS: ICD-10-CM

## 2019-07-29 LAB
ALBUMIN SERPL ELPH-MCNC: 4.5 G/DL — SIGNIFICANT CHANGE UP (ref 3.3–5.2)
ALP SERPL-CCNC: 90 U/L — SIGNIFICANT CHANGE UP (ref 40–120)
ALT FLD-CCNC: 16 U/L — SIGNIFICANT CHANGE UP
ANION GAP SERPL CALC-SCNC: 11 MMOL/L — SIGNIFICANT CHANGE UP (ref 5–17)
APPEARANCE UR: CLEAR — SIGNIFICANT CHANGE UP
APTT BLD: 30 SEC — SIGNIFICANT CHANGE UP (ref 27.5–36.3)
AST SERPL-CCNC: 20 U/L — SIGNIFICANT CHANGE UP
BACTERIA # UR AUTO: NEGATIVE — SIGNIFICANT CHANGE UP
BASOPHILS # BLD AUTO: 0.04 K/UL — SIGNIFICANT CHANGE UP (ref 0–0.2)
BASOPHILS NFR BLD AUTO: 0.7 % — SIGNIFICANT CHANGE UP (ref 0–2)
BILIRUB SERPL-MCNC: 0.4 MG/DL — SIGNIFICANT CHANGE UP (ref 0.4–2)
BILIRUB UR-MCNC: NEGATIVE — SIGNIFICANT CHANGE UP
BLD GP AB SCN SERPL QL: SIGNIFICANT CHANGE UP
BUN SERPL-MCNC: 21 MG/DL — HIGH (ref 8–20)
CALCIUM SERPL-MCNC: 10 MG/DL — SIGNIFICANT CHANGE UP (ref 8.6–10.2)
CHLORIDE SERPL-SCNC: 104 MMOL/L — SIGNIFICANT CHANGE UP (ref 98–107)
CO2 SERPL-SCNC: 25 MMOL/L — SIGNIFICANT CHANGE UP (ref 22–29)
COLOR SPEC: YELLOW — SIGNIFICANT CHANGE UP
CREAT SERPL-MCNC: 1.11 MG/DL — SIGNIFICANT CHANGE UP (ref 0.5–1.3)
DIFF PNL FLD: NEGATIVE — SIGNIFICANT CHANGE UP
EOSINOPHIL # BLD AUTO: 0.1 K/UL — SIGNIFICANT CHANGE UP (ref 0–0.5)
EOSINOPHIL NFR BLD AUTO: 1.8 % — SIGNIFICANT CHANGE UP (ref 0–6)
EPI CELLS # UR: SIGNIFICANT CHANGE UP
GLUCOSE SERPL-MCNC: 126 MG/DL — HIGH (ref 70–115)
GLUCOSE UR QL: 100 MG/DL
HBA1C BLD-MCNC: 6.3 % — HIGH (ref 4–5.6)
HCT VFR BLD CALC: 37.5 % — LOW (ref 39–50)
HGB BLD-MCNC: 12.6 G/DL — LOW (ref 13–17)
IMM GRANULOCYTES NFR BLD AUTO: 0.2 % — SIGNIFICANT CHANGE UP (ref 0–1.5)
INR BLD: 0.98 RATIO — SIGNIFICANT CHANGE UP (ref 0.88–1.16)
KETONES UR-MCNC: NEGATIVE — SIGNIFICANT CHANGE UP
LEUKOCYTE ESTERASE UR-ACNC: NEGATIVE — SIGNIFICANT CHANGE UP
LYMPHOCYTES # BLD AUTO: 1.6 K/UL — SIGNIFICANT CHANGE UP (ref 1–3.3)
LYMPHOCYTES # BLD AUTO: 28.9 % — SIGNIFICANT CHANGE UP (ref 13–44)
MCHC RBC-ENTMCNC: 32.5 PG — SIGNIFICANT CHANGE UP (ref 27–34)
MCHC RBC-ENTMCNC: 33.6 GM/DL — SIGNIFICANT CHANGE UP (ref 32–36)
MCV RBC AUTO: 96.6 FL — SIGNIFICANT CHANGE UP (ref 80–100)
MONOCYTES # BLD AUTO: 0.43 K/UL — SIGNIFICANT CHANGE UP (ref 0–0.9)
MONOCYTES NFR BLD AUTO: 7.8 % — SIGNIFICANT CHANGE UP (ref 2–14)
MRSA PCR RESULT.: SIGNIFICANT CHANGE UP
NEUTROPHILS # BLD AUTO: 3.36 K/UL — SIGNIFICANT CHANGE UP (ref 1.8–7.4)
NEUTROPHILS NFR BLD AUTO: 60.6 % — SIGNIFICANT CHANGE UP (ref 43–77)
NITRITE UR-MCNC: NEGATIVE — SIGNIFICANT CHANGE UP
NT-PROBNP SERPL-SCNC: 425 PG/ML — HIGH (ref 0–300)
PH UR: 5 — SIGNIFICANT CHANGE UP (ref 5–8)
PLATELET # BLD AUTO: 269 K/UL — SIGNIFICANT CHANGE UP (ref 150–400)
POTASSIUM SERPL-MCNC: 4.6 MMOL/L — SIGNIFICANT CHANGE UP (ref 3.5–5.3)
POTASSIUM SERPL-SCNC: 4.6 MMOL/L — SIGNIFICANT CHANGE UP (ref 3.5–5.3)
PREALB SERPL-MCNC: 27 MG/DL — SIGNIFICANT CHANGE UP (ref 18–38)
PROT SERPL-MCNC: 7.2 G/DL — SIGNIFICANT CHANGE UP (ref 6.6–8.7)
PROT UR-MCNC: 15 MG/DL
PROTHROM AB SERPL-ACNC: 11.3 SEC — SIGNIFICANT CHANGE UP (ref 10–12.9)
RBC # BLD: 3.88 M/UL — LOW (ref 4.2–5.8)
RBC # FLD: 12.1 % — SIGNIFICANT CHANGE UP (ref 10.3–14.5)
RBC CASTS # UR COMP ASSIST: NEGATIVE /HPF — SIGNIFICANT CHANGE UP (ref 0–4)
S AUREUS DNA NOSE QL NAA+PROBE: SIGNIFICANT CHANGE UP
SODIUM SERPL-SCNC: 140 MMOL/L — SIGNIFICANT CHANGE UP (ref 135–145)
SP GR SPEC: 1.02 — SIGNIFICANT CHANGE UP (ref 1.01–1.02)
T3 SERPL-MCNC: 83 NG/DL — SIGNIFICANT CHANGE UP (ref 80–200)
T4 AB SER-ACNC: 7.3 UG/DL — SIGNIFICANT CHANGE UP (ref 4.5–12)
TSH SERPL-MCNC: 3.96 UIU/ML — SIGNIFICANT CHANGE UP (ref 0.27–4.2)
UROBILINOGEN FLD QL: NEGATIVE MG/DL — SIGNIFICANT CHANGE UP
WBC # BLD: 5.54 K/UL — SIGNIFICANT CHANGE UP (ref 3.8–10.5)
WBC # FLD AUTO: 5.54 K/UL — SIGNIFICANT CHANGE UP (ref 3.8–10.5)
WBC UR QL: SIGNIFICANT CHANGE UP

## 2019-07-29 PROCEDURE — 99212 OFFICE O/P EST SF 10 MIN: CPT

## 2019-07-29 RX ORDER — CEFUROXIME AXETIL 250 MG
1500 TABLET ORAL ONCE
Refills: 0 | Status: COMPLETED | OUTPATIENT
Start: 2019-08-02 | End: 2019-08-02

## 2019-07-29 RX ORDER — SODIUM CHLORIDE 9 MG/ML
3 INJECTION INTRAMUSCULAR; INTRAVENOUS; SUBCUTANEOUS EVERY 8 HOURS
Refills: 0 | Status: DISCONTINUED | OUTPATIENT
Start: 2019-08-02 | End: 2019-08-03

## 2019-07-29 NOTE — PATIENT PROFILE ADULT - NSPROEDALEARNPREF_GEN_A_NUR
written material/individual instruction/Pre op teaching surgical scrub pain management instructions given to pt and his wife/verbal instruction

## 2019-07-29 NOTE — H&P PST ADULT - NSICDXPROBLEM_GEN_ALL_CORE_FT
PROBLEM DIAGNOSES  Problem: Non-rheumatic aortic stenosis  Assessment and Plan: Transcatheter Aortic Valve Replacement, Transfemoral     Problem: Need for prophylactic measure  Assessment and Plan:     Problem: Screening for substance abuse  Assessment and Plan: PROBLEM DIAGNOSES  Problem: DM2 (diabetes mellitus, type 2)  Assessment and Plan: Follows with PCP    Problem: CAD (coronary artery disease)  Assessment and Plan: Follows with Cardiologist     Problem: Non-rheumatic aortic stenosis  Assessment and Plan: Transcatheter Aortic Valve Replacement, Transfemoral     Problem: Need for prophylactic measure  Assessment and Plan: Moderate risk. Surgical Team to evaluate need for pharmacologic VTE Prophylaxis    Problem: Screening for substance abuse  Assessment and Plan: Opioid screening tool score =0.  Low risk for potential future abuse

## 2019-07-29 NOTE — H&P PST ADULT - ASSESSMENT
CAPRINI VTE 2.0 SCORE [CLOT updated 2019]    AGE RELATED RISK FACTORS                                                       MOBILITY RELATED FACTORS  [ ] Age 41-60 years                                            (1 Point)                    [ ] Bed rest                                                        (1 Point)  [ ] Age: 61-74 years                                           (2 Points)                  [ ] Plaster cast                                                   (2 Points)  [ ] Age= 75 years                                              (3 Points)                    [ ] Bed bound for more than 72 hours                 (2 Points)    DISEASE RELATED RISK FACTORS                                               GENDER SPECIFIC FACTORS  [ ] Edema in the lower extremities                       (1 Point)              [ ] Pregnancy                                                     (1 Point)  [ ] Varicose veins                                               (1 Point)                     [ ] Post-partum < 6 weeks                                   (1 Point)             [ ] BMI > 25 Kg/m2                                            (1 Point)                     [ ] Hormonal therapy  or oral contraception          (1 Point)                 [ ] Sepsis (in the previous month)                        (1 Point)               [ ] History of pregnancy complications                 (1 point)  [ ] Pneumonia or serious lung disease                                               [ ] Unexplained or recurrent                     (1 Point)           (in the previous month)                               (1 Point)  [ ] Abnormal pulmonary function test                     (1 Point)                 SURGERY RELATED RISK FACTORS  [ ] Acute myocardial infarction                              (1 Point)               [ ]  Section                                             (1 Point)  [ ] Congestive heart failure (in the previous month)  (1 Point)      [ ] Minor surgery                                                  (1 Point)   [ ] Inflammatory bowel disease                             (1 Point)               [ ] Arthroscopic surgery                                        (2 Points)  [ ] Central venous access                                      (2 Points)                [ ] General surgery lasting more than 45 minutes (2 points)  [ ] Malignancy- Present or previous                   (2 Points)                [ ] Elective arthroplasty                                         (5 points)    [ ] Stroke (in the previous month)                          (5 Points)                                                                                                                                                           HEMATOLOGY RELATED FACTORS                                                 TRAUMA RELATED RISK FACTORS  [ ] Prior episodes of VTE                                     (3 Points)                [ ] Fracture of the hip, pelvis, or leg                       (5 Points)  [ ] Positive family history for VTE                         (3 Points)             [ ] Acute spinal cord injury (in the previous month)  (5 Points)  [ ] Prothrombin 19269 A                                     (3 Points)               [ ] Paralysis  (less than 1 month)                             (5 Points)  [ ] Factor V Leiden                                             (3 Points)                  [ ] Multiple Trauma within 1 month                        (5 Points)  [ ] Lupus anticoagulants                                     (3 Points)                                                           [ ] Anticardiolipin antibodies                               (3 Points)                                                       [ ] High homocysteine in the blood                      (3 Points)                                             [ ] Other congenital or acquired thrombophilia      (3 Points)                                                [ ] Heparin induced thrombocytopenia                  (3 Points)                                     Total Score [          ]    OPIOID RISK TOOL    AMADOR EACH BOX THAT APPLIES AND ADD TOTALS AT THE END    FAMILY HISTORY OF SUBSTANCE ABUSE                 FEMALE         MALE                                                Alcohol                            [    ] 1 pt         [     ] 3pts                                               Illegal Drugs                    [    ] 2 pts       [     ] 3pts                                               Rx Drugs                          [      ]4 pts      [     ] 4 pts    PERSONAL HISTORY OF SUBSTANCE ABUSE                                                                                          Alcohol                            [     ] 3 pts       [     ] 3 pts                                               Illegal Drugs                    [     ] 4 pts       [     ] 4 pts                                               Rx Drugs                          [     ] 5 pts       [     ] 5 pts    AGE BETWEEN 16-45 YEARS                                     [     ] 1 pt         [     ] 1 pt    HISTORY OF PREADOLESCENT   SEXUAL ABUSE                                                            [     ] 3 pts        [     ] 0pts    PSYCHOLOGICAL DISEASE                     ADD, OCD, Bipolar, Schizophrenia        [     ] 2 pts        [     ] 2 pts                      Depression                                               [     ] 1 pt          [     ] 1 pt           SCORING TOTAL   (add numbers and type here)              (      )                                     A score of 3 or lower indicated LOW risk for future opioid abuse  A score of 4 to 7 indicated moderate risk for future opioid abuse  A score of 8 or higher indicates a high risk for opioid abuse CAPRINI VTE 2.0 SCORE [CLOT updated 2019]    AGE RELATED RISK FACTORS                                                       MOBILITY RELATED FACTORS  [ ] Age 41-60 years                                            (1 Point)                    [ ] Bed rest                                                        (1 Point)  [ ] Age: 61-74 years                                           (2 Points)                  [ ] Plaster cast                                                   (2 Points)  [x ] Age= 75 years                                              (3 Points)                    [ ] Bed bound for more than 72 hours                 (2 Points)    DISEASE RELATED RISK FACTORS                                               GENDER SPECIFIC FACTORS  [ ] Edema in the lower extremities                       (1 Point)              [ ] Pregnancy                                                     (1 Point)  [ ] Varicose veins                                               (1 Point)                     [ ] Post-partum < 6 weeks                                   (1 Point)             [ ] BMI > 25 Kg/m2                                            (1 Point)                     [ ] Hormonal therapy  or oral contraception          (1 Point)                 [ ] Sepsis (in the previous month)                        (1 Point)               [ ] History of pregnancy complications                 (1 point)  [ ] Pneumonia or serious lung disease                                               [ ] Unexplained or recurrent                     (1 Point)           (in the previous month)                               (1 Point)  [ ] Abnormal pulmonary function test                     (1 Point)                 SURGERY RELATED RISK FACTORS  [ ] Acute myocardial infarction                              (1 Point)               [ ]  Section                                             (1 Point)  [ ] Congestive heart failure (in the previous month)  (1 Point)      [ ] Minor surgery                                                  (1 Point)   [ ] Inflammatory bowel disease                             (1 Point)               [ ] Arthroscopic surgery                                        (2 Points)  [ ] Central venous access                                      (2 Points)                [x ] General surgery lasting more than 45 minutes (2 points)  [ ] Malignancy- Present or previous                   (2 Points)                [ ] Elective arthroplasty                                         (5 points)    [ ] Stroke (in the previous month)                          (5 Points)                                                                                                                                                           HEMATOLOGY RELATED FACTORS                                                 TRAUMA RELATED RISK FACTORS  [ ] Prior episodes of VTE                                     (3 Points)                [ ] Fracture of the hip, pelvis, or leg                       (5 Points)  [ ] Positive family history for VTE                         (3 Points)             [ ] Acute spinal cord injury (in the previous month)  (5 Points)  [ ] Prothrombin 27044 A                                     (3 Points)               [ ] Paralysis  (less than 1 month)                             (5 Points)  [ ] Factor V Leiden                                             (3 Points)                  [ ] Multiple Trauma within 1 month                        (5 Points)  [ ] Lupus anticoagulants                                     (3 Points)                                                           [ ] Anticardiolipin antibodies                               (3 Points)                                                       [ ] High homocysteine in the blood                      (3 Points)                                             [ ] Other congenital or acquired thrombophilia      (3 Points)                                                [ ] Heparin induced thrombocytopenia                  (3 Points)                                     Total Score [     5     ]    OPIOID RISK TOOL    AMADOR EACH BOX THAT APPLIES AND ADD TOTALS AT THE END    FAMILY HISTORY OF SUBSTANCE ABUSE                 FEMALE         MALE                                                Alcohol                            [    ] 1 pt         [     ] 3pts                                               Illegal Drugs                    [    ] 2 pts       [     ] 3pts                                               Rx Drugs                          [      ]4 pts      [     ] 4 pts    PERSONAL HISTORY OF SUBSTANCE ABUSE                                                                                          Alcohol                            [     ] 3 pts       [     ] 3 pts                                               Illegal Drugs                    [     ] 4 pts       [     ] 4 pts                                               Rx Drugs                          [     ] 5 pts       [     ] 5 pts    AGE BETWEEN 16-45 YEARS                                     [     ] 1 pt         [     ] 1 pt    HISTORY OF PREADOLESCENT   SEXUAL ABUSE                                                            [     ] 3 pts        [     ] 0pts    PSYCHOLOGICAL DISEASE                     ADD, OCD, Bipolar, Schizophrenia        [     ] 2 pts        [     ] 2 pts                      Depression                                               [     ] 1 pt          [     ] 1 pt           SCORING TOTAL   (add numbers and type here)              (    0  )                                     A score of 3 or lower indicated LOW risk for future opioid abuse  A score of 4 to 7 indicated moderate risk for future opioid abuse  A score of 8 or higher indicates a high risk for opioid abuse

## 2019-07-29 NOTE — H&P PST ADULT - NSICDXPASTSURGICALHX_GEN_ALL_CORE_FT
PAST SURGICAL HISTORY:  S/P CABG (coronary artery bypass graft) PAST SURGICAL HISTORY:  History of partial knee replacement Bilateral    Kidney stones     S/P CABG (coronary artery bypass graft) CABG x    S/P cholecystectomy

## 2019-07-29 NOTE — H&P PST ADULT - NSICDXPASTMEDICALHX_GEN_ALL_CORE_FT
PAST MEDICAL HISTORY:  CAD (coronary artery disease)     DM2 (diabetes mellitus, type 2)     HTN (hypertension) PAST MEDICAL HISTORY:  CAD (coronary artery disease)     DM2 (diabetes mellitus, type 2)     History of TIAs Recently diagnosed this July 2019 while in ED at [facility]    HTN (hypertension)     Macular degeneration     Risk factors for obstructive sleep apnea

## 2019-07-29 NOTE — H&P PST ADULT - HISTORY OF PRESENT ILLNESS
This is an 83 y.o male who presents to PST today. This is an 83 y.o male who presents to PST today.  The  pt reports approximately one month ago onset of dizziness, fatigue and ELIZONDO.  He was evaluated at [facility] ED (pt resides in [place]) and was admitted and discharged this past Thursday July 25, 2019.  He was diagnosed with a "mini-stroke" and aortic valve disorder.   He is scheduled for a TAVR in the near future.

## 2019-07-30 LAB
CULTURE RESULTS: NO GROWTH — SIGNIFICANT CHANGE UP
SPECIMEN SOURCE: SIGNIFICANT CHANGE UP

## 2019-08-01 ENCOUNTER — TRANSCRIPTION ENCOUNTER (OUTPATIENT)
Age: 84
End: 2019-08-01

## 2019-08-02 ENCOUNTER — INPATIENT (INPATIENT)
Facility: HOSPITAL | Age: 84
LOS: 0 days | Discharge: HOME | DRG: 267 | End: 2019-08-03
Attending: THORACIC SURGERY (CARDIOTHORACIC VASCULAR SURGERY) | Admitting: THORACIC SURGERY (CARDIOTHORACIC VASCULAR SURGERY)
Payer: MEDICARE

## 2019-08-02 ENCOUNTER — APPOINTMENT (OUTPATIENT)
Dept: CARDIOTHORACIC SURGERY | Facility: HOSPITAL | Age: 84
End: 2019-08-02

## 2019-08-02 VITALS
OXYGEN SATURATION: 97 % | RESPIRATION RATE: 16 BRPM | SYSTOLIC BLOOD PRESSURE: 155 MMHG | DIASTOLIC BLOOD PRESSURE: 56 MMHG | TEMPERATURE: 97 F | HEART RATE: 58 BPM | HEIGHT: 70 IN | WEIGHT: 163.14 LBS

## 2019-08-02 DIAGNOSIS — N20.0 CALCULUS OF KIDNEY: Chronic | ICD-10-CM

## 2019-08-02 DIAGNOSIS — I35.0 NONRHEUMATIC AORTIC (VALVE) STENOSIS: ICD-10-CM

## 2019-08-02 DIAGNOSIS — Z86.79 PERSONAL HISTORY OF OTHER DISEASES OF THE CIRCULATORY SYSTEM: ICD-10-CM

## 2019-08-02 DIAGNOSIS — Z90.49 ACQUIRED ABSENCE OF OTHER SPECIFIED PARTS OF DIGESTIVE TRACT: Chronic | ICD-10-CM

## 2019-08-02 DIAGNOSIS — Z96.659 PRESENCE OF UNSPECIFIED ARTIFICIAL KNEE JOINT: Chronic | ICD-10-CM

## 2019-08-02 DIAGNOSIS — R42 DIZZINESS AND GIDDINESS: ICD-10-CM

## 2019-08-02 DIAGNOSIS — Z86.39 PERSONAL HISTORY OF OTHER ENDOCRINE, NUTRITIONAL AND METABOLIC DISEASE: ICD-10-CM

## 2019-08-02 DIAGNOSIS — Z95.1 PRESENCE OF AORTOCORONARY BYPASS GRAFT: Chronic | ICD-10-CM

## 2019-08-02 LAB
ABO RH CONFIRMATION: SIGNIFICANT CHANGE UP
ANION GAP SERPL CALC-SCNC: 11 MMOL/L — SIGNIFICANT CHANGE UP (ref 5–17)
APTT BLD: 66.4 SEC — HIGH (ref 27.5–36.3)
BUN SERPL-MCNC: 23 MG/DL — HIGH (ref 8–20)
CALCIUM SERPL-MCNC: 9 MG/DL — SIGNIFICANT CHANGE UP (ref 8.6–10.2)
CHLORIDE SERPL-SCNC: 105 MMOL/L — SIGNIFICANT CHANGE UP (ref 98–107)
CK SERPL-CCNC: 48 U/L — SIGNIFICANT CHANGE UP (ref 30–200)
CO2 SERPL-SCNC: 21 MMOL/L — LOW (ref 22–29)
CREAT SERPL-MCNC: 1.08 MG/DL — SIGNIFICANT CHANGE UP (ref 0.5–1.3)
GAS PNL BLDA: SIGNIFICANT CHANGE UP
GLUCOSE BLDC GLUCOMTR-MCNC: 139 MG/DL — HIGH (ref 70–99)
GLUCOSE SERPL-MCNC: 193 MG/DL — HIGH (ref 70–115)
HCT VFR BLD CALC: 29.4 % — LOW (ref 39–50)
HGB BLD-MCNC: 10.5 G/DL — LOW (ref 13–17)
INR BLD: 1.08 RATIO — SIGNIFICANT CHANGE UP (ref 0.88–1.16)
MAGNESIUM SERPL-MCNC: 1.7 MG/DL — SIGNIFICANT CHANGE UP (ref 1.6–2.6)
MCHC RBC-ENTMCNC: 33.5 PG — SIGNIFICANT CHANGE UP (ref 27–34)
MCHC RBC-ENTMCNC: 35.7 GM/DL — SIGNIFICANT CHANGE UP (ref 32–36)
MCV RBC AUTO: 93.9 FL — SIGNIFICANT CHANGE UP (ref 80–100)
PLATELET # BLD AUTO: 217 K/UL — SIGNIFICANT CHANGE UP (ref 150–400)
POTASSIUM SERPL-MCNC: 4.5 MMOL/L — SIGNIFICANT CHANGE UP (ref 3.5–5.3)
POTASSIUM SERPL-SCNC: 4.5 MMOL/L — SIGNIFICANT CHANGE UP (ref 3.5–5.3)
PROTHROM AB SERPL-ACNC: 12.4 SEC — SIGNIFICANT CHANGE UP (ref 10–12.9)
RBC # BLD: 3.13 M/UL — LOW (ref 4.2–5.8)
RBC # FLD: 11.9 % — SIGNIFICANT CHANGE UP (ref 10.3–14.5)
SODIUM SERPL-SCNC: 137 MMOL/L — SIGNIFICANT CHANGE UP (ref 135–145)
TROPONIN T SERPL-MCNC: 0.04 NG/ML — SIGNIFICANT CHANGE UP (ref 0–0.06)
WBC # BLD: 7.72 K/UL — SIGNIFICANT CHANGE UP (ref 3.8–10.5)
WBC # FLD AUTO: 7.72 K/UL — SIGNIFICANT CHANGE UP (ref 3.8–10.5)

## 2019-08-02 PROCEDURE — 36415 COLL VENOUS BLD VENIPUNCTURE: CPT

## 2019-08-02 PROCEDURE — 85027 COMPLETE CBC AUTOMATED: CPT

## 2019-08-02 PROCEDURE — 86900 BLOOD TYPING SEROLOGIC ABO: CPT

## 2019-08-02 PROCEDURE — 84134 ASSAY OF PREALBUMIN: CPT

## 2019-08-02 PROCEDURE — 84480 ASSAY TRIIODOTHYRONINE (T3): CPT

## 2019-08-02 PROCEDURE — 86901 BLOOD TYPING SEROLOGIC RH(D): CPT

## 2019-08-02 PROCEDURE — 99223 1ST HOSP IP/OBS HIGH 75: CPT

## 2019-08-02 PROCEDURE — 86923 COMPATIBILITY TEST ELECTRIC: CPT

## 2019-08-02 PROCEDURE — 86850 RBC ANTIBODY SCREEN: CPT

## 2019-08-02 PROCEDURE — 84436 ASSAY OF TOTAL THYROXINE: CPT

## 2019-08-02 PROCEDURE — G0463: CPT

## 2019-08-02 PROCEDURE — 81001 URINALYSIS AUTO W/SCOPE: CPT

## 2019-08-02 PROCEDURE — 85730 THROMBOPLASTIN TIME PARTIAL: CPT

## 2019-08-02 PROCEDURE — 87086 URINE CULTURE/COLONY COUNT: CPT

## 2019-08-02 PROCEDURE — 83036 HEMOGLOBIN GLYCOSYLATED A1C: CPT

## 2019-08-02 PROCEDURE — 80053 COMPREHEN METABOLIC PANEL: CPT

## 2019-08-02 PROCEDURE — 93010 ELECTROCARDIOGRAM REPORT: CPT

## 2019-08-02 PROCEDURE — 33362 REPLACE AORTIC VALVE OPEN: CPT | Mod: Q0,62

## 2019-08-02 PROCEDURE — 93880 EXTRACRANIAL BILAT STUDY: CPT | Mod: 26

## 2019-08-02 PROCEDURE — 85610 PROTHROMBIN TIME: CPT

## 2019-08-02 PROCEDURE — 83880 ASSAY OF NATRIURETIC PEPTIDE: CPT

## 2019-08-02 PROCEDURE — 93355 ECHO TRANSESOPHAGEAL (TEE): CPT

## 2019-08-02 PROCEDURE — 84443 ASSAY THYROID STIM HORMONE: CPT

## 2019-08-02 PROCEDURE — 87641 MR-STAPH DNA AMP PROBE: CPT

## 2019-08-02 PROCEDURE — 71045 X-RAY EXAM CHEST 1 VIEW: CPT | Mod: 26

## 2019-08-02 PROCEDURE — 87640 STAPH A DNA AMP PROBE: CPT

## 2019-08-02 RX ORDER — ALBUMIN HUMAN 25 %
250 VIAL (ML) INTRAVENOUS ONCE
Refills: 0 | Status: COMPLETED | OUTPATIENT
Start: 2019-08-02 | End: 2019-08-02

## 2019-08-02 RX ORDER — SODIUM CHLORIDE 9 MG/ML
1000 INJECTION INTRAMUSCULAR; INTRAVENOUS; SUBCUTANEOUS
Refills: 0 | Status: DISCONTINUED | OUTPATIENT
Start: 2019-08-02 | End: 2019-08-03

## 2019-08-02 RX ORDER — MAGNESIUM SULFATE 500 MG/ML
2 VIAL (ML) INJECTION ONCE
Refills: 0 | Status: COMPLETED | OUTPATIENT
Start: 2019-08-02 | End: 2019-08-02

## 2019-08-02 RX ORDER — MAGNESIUM SULFATE 500 MG/ML
1 VIAL (ML) INJECTION ONCE
Refills: 0 | Status: COMPLETED | OUTPATIENT
Start: 2019-08-02 | End: 2019-08-02

## 2019-08-02 RX ORDER — HYDROMORPHONE HYDROCHLORIDE 2 MG/ML
0.5 INJECTION INTRAMUSCULAR; INTRAVENOUS; SUBCUTANEOUS ONCE
Refills: 0 | Status: DISCONTINUED | OUTPATIENT
Start: 2019-08-02 | End: 2019-08-02

## 2019-08-02 RX ORDER — ATORVASTATIN CALCIUM 80 MG/1
10 TABLET, FILM COATED ORAL AT BEDTIME
Refills: 0 | Status: DISCONTINUED | OUTPATIENT
Start: 2019-08-02 | End: 2019-08-03

## 2019-08-02 RX ORDER — CEFUROXIME AXETIL 250 MG
1500 TABLET ORAL EVERY 8 HOURS
Refills: 0 | Status: COMPLETED | OUTPATIENT
Start: 2019-08-02 | End: 2019-08-03

## 2019-08-02 RX ORDER — ONDANSETRON 8 MG/1
4 TABLET, FILM COATED ORAL ONCE
Refills: 0 | Status: DISCONTINUED | OUTPATIENT
Start: 2019-08-02 | End: 2019-08-02

## 2019-08-02 RX ORDER — PROTAMINE SULFATE 10 MG/ML
50 AMPUL (ML) INTRAVENOUS ONCE
Refills: 0 | Status: DISCONTINUED | OUTPATIENT
Start: 2019-08-02 | End: 2019-08-02

## 2019-08-02 RX ORDER — ASPIRIN/CALCIUM CARB/MAGNESIUM 324 MG
81 TABLET ORAL DAILY
Refills: 0 | Status: DISCONTINUED | OUTPATIENT
Start: 2019-08-02 | End: 2019-08-03

## 2019-08-02 RX ORDER — ONDANSETRON 8 MG/1
4 TABLET, FILM COATED ORAL ONCE
Refills: 0 | Status: COMPLETED | OUTPATIENT
Start: 2019-08-02 | End: 2019-08-02

## 2019-08-02 RX ORDER — CLOPIDOGREL BISULFATE 75 MG/1
75 TABLET, FILM COATED ORAL DAILY
Refills: 0 | Status: DISCONTINUED | OUTPATIENT
Start: 2019-08-02 | End: 2019-08-03

## 2019-08-02 RX ADMIN — SODIUM CHLORIDE 10 MILLILITER(S): 9 INJECTION INTRAMUSCULAR; INTRAVENOUS; SUBCUTANEOUS at 14:58

## 2019-08-02 RX ADMIN — ONDANSETRON 4 MILLIGRAM(S): 8 TABLET, FILM COATED ORAL at 14:59

## 2019-08-02 RX ADMIN — Medication 125 MILLILITER(S): at 14:58

## 2019-08-02 RX ADMIN — Medication 50 GRAM(S): at 16:08

## 2019-08-02 RX ADMIN — Medication 81 MILLIGRAM(S): at 22:23

## 2019-08-02 RX ADMIN — ATORVASTATIN CALCIUM 10 MILLIGRAM(S): 80 TABLET, FILM COATED ORAL at 22:23

## 2019-08-02 RX ADMIN — SODIUM CHLORIDE 3 MILLILITER(S): 9 INJECTION INTRAMUSCULAR; INTRAVENOUS; SUBCUTANEOUS at 21:58

## 2019-08-02 RX ADMIN — HYDROMORPHONE HYDROCHLORIDE 0.5 MILLIGRAM(S): 2 INJECTION INTRAMUSCULAR; INTRAVENOUS; SUBCUTANEOUS at 14:56

## 2019-08-02 RX ADMIN — Medication 100 MILLIGRAM(S): at 17:57

## 2019-08-02 RX ADMIN — SODIUM CHLORIDE 3 MILLILITER(S): 9 INJECTION INTRAMUSCULAR; INTRAVENOUS; SUBCUTANEOUS at 15:00

## 2019-08-02 RX ADMIN — HYDROMORPHONE HYDROCHLORIDE 0.5 MILLIGRAM(S): 2 INJECTION INTRAMUSCULAR; INTRAVENOUS; SUBCUTANEOUS at 14:35

## 2019-08-02 RX ADMIN — CLOPIDOGREL BISULFATE 75 MILLIGRAM(S): 75 TABLET, FILM COATED ORAL at 22:23

## 2019-08-02 NOTE — CONSULT NOTE ADULT - SUBJECTIVE AND OBJECTIVE BOX
83 year old male patient with a history of HTN, DM, HLD, BERNIE, CAD s/p CABG, admitted for elective TAVR. Patient reports approximately one month ago onset of dizziness, fatigue and ELIZONDO.  He has a remote admission to [facility] ED (pt resides in [place]) and was admitted and discharged this past .  He was noted to have severe aortic stenosis with preserved LV function. He underwent a successful 29mm Diaz Thomas valve on 19 via RFA.    PAST MEDICAL & SURGICAL HISTORY:  Macular degeneration  Risk factors for obstructive sleep apnea  History of TIAs: Recently diagnosed this 2019 while in ED at [facility]  DM2 (diabetes mellitus, type 2)  CAD (coronary artery disease)  HTN (hypertension)  History of partial knee replacement: Bilateral  Kidney stones  S/P cholecystectomy  S/P CABG (coronary artery bypass graft): CABG x    REVIEW OF SYSTEMS  General:	  Skin/Breast:	  Ophthalmologic:	  ENMT:	  Respiratory and Thorax:	  Cardiovascular:	  Gastrointestinal:	  Genitourinary:	  Musculoskeletal:	  Neurological:	  Psychiatric:	  Hematology/Lymphatics:	  Endocrine:	  Allergic/Immunologic:	    MEDICATIONS  (STANDING):  aspirin enteric coated 81 milliGRAM(s) Oral daily  atorvastatin 10 milliGRAM(s) Oral at bedtime  cefuroxime  IVPB 1500 milliGRAM(s) IV Intermittent every 8 hours  cefuroxime  IVPB 1500 milliGRAM(s) IV Intermittent once  clopidogrel Tablet 75 milliGRAM(s) Oral daily  magnesium sulfate  IVPB 2 Gram(s) IV Intermittent once  sodium chloride 0.9% lock flush 3 milliLiter(s) IV Push every 8 hours  sodium chloride 0.9%. 1000 milliLiter(s) (10 mL/Hr) IV Continuous <Continuous>    Allergies  No Known Allergies    SOCIAL HISTORY:    FAMILY HISTORY:  FH: myocardial infarction (Mother)    Vital Signs Last 24 Hrs  T(C): 35.5 (02 Aug 2019 14:15), Max: 36.2 (02 Aug 2019 08:54)  T(F): 95.9 (02 Aug 2019 14:15), Max: 97.2 (02 Aug 2019 08:54)  HR: 60 (02 Aug 2019 15:15) (52 - 62)  BP: 155/56 (02 Aug 2019 08:54) (155/56 - 155/56)  RR: 13 (02 Aug 2019 15:15) (10 - 29)  SpO2: 97% (02 Aug 2019 15:15) (94% - 98%)    Physical Exam:  Constitutional: AAOx3, NAD  Neck: supple, No JVD  Cardiovascular: +S1S2 RRR, no murmurs, rubs, gallops   Pulmonary: CTA b/l, unlabored, no wheezes, rales. rhonci  Abdomen: +BS, soft NTND  Extremities: no edema b/l, +distal pulses b/l  Neuro: non focal, speech clear, ZHU x 4    LABS:                        10.5   7.72  )-----------( 217      ( 02 Aug 2019 13:53 )             29.4     137  |  105  |  23.0<H>  ----------------------------<  193<H>  4.5   |  21.0<L>  |  1.08  Ca    9.0      02 Aug 2019 13:53  Mg     1.7     08-02  PT/INR - ( 02 Aug 2019 13:53 )   PT: 12.4 sec;   INR: 1.08 ratio    PTT - ( 02 Aug 2019 13:53 )  PTT:66.4 secCARDIAC MARKERS ( 02 Aug 2019 13:53 )  x     / 0.04 ng/mL / 48 U/L / x     / x        RADIOLOGY & ADDITIONAL STUDIES:  TTE: 19  Summary:   1. Technically fair study.   2. Endocardial visualization was enhanced with intravenous echo contrast.   3. Normal global left ventricular systolic function.   4. Left ventricular ejection fraction, by visual estimation, is 55 to 60%.   5. Spectral Doppler shows impaired relaxation pattern of left ventricular myocardial filling (Grade I diastolic dysfunction).   6. There is mild left ventricular hypertrophy.   7. There is no evidence of pericardial effusion.   8. Mild mitral valve regurgitation.   9. Mild thickening of the anterior and posterior mitral valve leaflets.  10. Low velocity, low gradient aortic stenosis.  11. Sclerotic aortic valve with decreased opening.  12. Peak transaortic gradient equals 38.6 mmHg, mean transaortic gradient equals 25.4 mmHg, the calculated aortic valve area equals 0.71 cm² by the continuity equation consistent with severe aortic stenosis.  13. Estimated pulmonary artery systolic pressure is 36.4 mmHg assuming a right atrial pressure of 3 mmHg, which is consistent with borderline pulmonary hypertension.    Cath 19  DIAGNOSTIC IMPRESSIONS: Totally occluded LM  Patent mid RCA stent with moderate proximal RCA stenosis.  Patent MASTERS to LAD  Patent SVG to OM3 and retrogradely filling then whole CX system ( mid 80%  CX stenosis)  Known occluded SVG to RCA ( cath 2017)  Peak to peak gradient on pullback across the aortic valve is 27 mmHg (  likely reflecting moderate aortic stenosis )  Elevated left sided filling pressure ( LVEDP 20 mmHg)  DIAGNOSTIC RECOMMENDATIONS: continue medical therapy for CAD.  Evaluation for AVR ( surgical vs TAVR) based on echo results ( severe low  flow, low gradient aortic stenosis)    Cath 19  DIAGNOSTIC IMPRESSIONS: Severe Aortic Stenosis  Normal Ascending Aorta  Normal Distal aorta, common Iliac and External Iliac Arteries.  Successful TAVR (Diaz Thomas) 29mm via RFA  Successful deployment and Removal of Temporary Pacemaker  Successful Deployment of LFA Closure Device (Angioseal)    CXR 19  Impression:  Status post insertion of prosthetic aortic valve. Pacer wire noted..    EK19  SR at 51bpm; QRSD 102ms; single PAC    A/P 83 year old male patient with a history of HTN, DM, HLD, BERNIE, CAD s/p CABG, admitted for elective TAVR. Patient reports approximately one month ago onset of dizziness, fatigue and ELIZONDO.  He has a remote admission to [facility] ED (pt resides in [place]) and was admitted and discharged this past .  He was noted to have severe aortic stenosis with preserved LV function. He underwent a successful 29mm Diaz Thomas valve on 19 via RFA.    PAST MEDICAL & SURGICAL HISTORY:  Macular degeneration  Risk factors for obstructive sleep apnea  History of TIAs: Recently diagnosed this 2019 while in ED at [facility]  DM2 (diabetes mellitus, type 2)  CAD (coronary artery disease)  HTN (hypertension)  History of partial knee replacement: Bilateral  Kidney stones  S/P cholecystectomy  S/P CABG (coronary artery bypass graft): CABG x    REVIEW OF SYSTEMS  General: - fever or chills  Skin/Breast: - rashes  Ophthalmologic: - blurred vision	  ENMT: - sore throat  Respiratory and Thorax: + dyspnea, - cough	  Cardiovascular:	+ELIZONDO, - chest pain  Gastrointestinal: - N/V/D/C  Genitourinary: - dysuria  Musculoskeletal:	 - arthritis  Neurological: - weaknesses  Psychiatric: - depression or anxiety	    MEDICATIONS  (STANDING):  aspirin enteric coated 81 milliGRAM(s) Oral daily  atorvastatin 10 milliGRAM(s) Oral at bedtime  cefuroxime  IVPB 1500 milliGRAM(s) IV Intermittent every 8 hours  cefuroxime  IVPB 1500 milliGRAM(s) IV Intermittent once  clopidogrel Tablet 75 milliGRAM(s) Oral daily  magnesium sulfate  IVPB 2 Gram(s) IV Intermittent once  sodium chloride 0.9% lock flush 3 milliLiter(s) IV Push every 8 hours  sodium chloride 0.9%. 1000 milliLiter(s) (10 mL/Hr) IV Continuous <Continuous>    Allergies  No Known Allergies    SOCIAL HISTORY: Social ETOH, former smoker    FAMILY HISTORY:  FH: myocardial infarction (Mother)    Vital Signs Last 24 Hrs  T(C): 35.5 (02 Aug 2019 14:15), Max: 36.2 (02 Aug 2019 08:54)  T(F): 95.9 (02 Aug 2019 14:15), Max: 97.2 (02 Aug 2019 08:54)  HR: 60 (02 Aug 2019 15:15) (52 - 62)  BP: 155/56 (02 Aug 2019 08:54) (155/56 - 155/56)  RR: 13 (02 Aug 2019 15:15) (10 - 29)  SpO2: 97% (02 Aug 2019 15:15) (94% - 98%)    Physical Exam:  Constitutional: AAOx3, NAD  Neck: supple, No JVD  Cardiovascular: +S1S2 RRR, no murmurs, rubs, gallops   Pulmonary: CTA b/l, unlabored, no wheezes, rales. No rhonchi  Abdomen: +BS, soft NTND  Extremities: no edema b/l, +distal pulses b/l  Neuro: non focal, speech clear, ZHU x 4    LABS:                        10.5   7.72  )-----------( 217      ( 02 Aug 2019 13:53 )             29.4     137  |  105  |  23.0<H>  ----------------------------<  193<H>  4.5   |  21.0<L>  |  1.08  Ca    9.0      02 Aug 2019 13:53  Mg     1.7     08-02  PT/INR - ( 02 Aug 2019 13:53 )   PT: 12.4 sec;   INR: 1.08 ratio    PTT - ( 02 Aug 2019 13:53 )  PTT:66.4 secCARDIAC MARKERS ( 02 Aug 2019 13:53 )  x     / 0.04 ng/mL / 48 U/L / x     / x        RADIOLOGY & ADDITIONAL STUDIES:  TTE: 19  Summary:   1. Technically fair study.   2. Endocardial visualization was enhanced with intravenous echo contrast.   3. Normal global left ventricular systolic function.   4. Left ventricular ejection fraction, by visual estimation, is 55 to 60%.   5. Spectral Doppler shows impaired relaxation pattern of left ventricular myocardial filling (Grade I diastolic dysfunction).   6. There is mild left ventricular hypertrophy.   7. There is no evidence of pericardial effusion.   8. Mild mitral valve regurgitation.   9. Mild thickening of the anterior and posterior mitral valve leaflets.  10. Low velocity, low gradient aortic stenosis.  11. Sclerotic aortic valve with decreased opening.  12. Peak transaortic gradient equals 38.6 mmHg, mean transaortic gradient equals 25.4 mmHg, the calculated aortic valve area equals 0.71 cm² by the continuity equation consistent with severe aortic stenosis.  13. Estimated pulmonary artery systolic pressure is 36.4 mmHg assuming a right atrial pressure of 3 mmHg, which is consistent with borderline pulmonary hypertension.    Cath 19  DIAGNOSTIC IMPRESSIONS: Totally occluded LM  Patent mid RCA stent with moderate proximal RCA stenosis.  Patent MASTERS to LAD  Patent SVG to OM3 and retrogradely filling then whole CX system ( mid 80%  CX stenosis)  Known occluded SVG to RCA ( cath 2017)  Peak to peak gradient on pullback across the aortic valve is 27 mmHg (  likely reflecting moderate aortic stenosis )  Elevated left sided filling pressure ( LVEDP 20 mmHg)  DIAGNOSTIC RECOMMENDATIONS: continue medical therapy for CAD.  Evaluation for AVR ( surgical vs TAVR) based on echo results ( severe low  flow, low gradient aortic stenosis)    Cath 19  DIAGNOSTIC IMPRESSIONS: Severe Aortic Stenosis  Normal Ascending Aorta  Normal Distal aorta, common Iliac and External Iliac Arteries.  Successful TAVR (Diaz Thomas) 29mm via RFA  Successful deployment and Removal of Temporary Pacemaker  Successful Deployment of LFA Closure Device (Angioseal)    CXR 19  Impression:  Status post insertion of prosthetic aortic valve. Pacer wire noted..    EK19  SR at 51bpm; QRSD 102ms; single PAC    A/P 83 year old male patient with a history of HTN, DM, HLD, BERNIE, CAD s/p CABG, admitted for elective TAVR. Patient reports approximately one month ago onset of dizziness, fatigue and ELIZONDO.  He has a remote admission to [facility] ED (pt resides in [place]) and was admitted and discharged this past .  He was noted to have severe aortic stenosis with preserved LV function. He underwent a successful 29mm Diaz Thomas valve on 19 via RFA.    PAST MEDICAL & SURGICAL HISTORY:  Macular degeneration  Risk factors for obstructive sleep apnea  History of TIAs: Recently diagnosed this 2019 while in ED at [facility]  DM2 (diabetes mellitus, type 2)  CAD (coronary artery disease)  HTN (hypertension)  History of partial knee replacement: Bilateral  Kidney stones  S/P cholecystectomy  S/P CABG (coronary artery bypass graft): CABG x    REVIEW OF SYSTEMS  General: - fever or chills  Skin/Breast: - rashes  Ophthalmologic: - blurred vision	  ENMT: - sore throat  Respiratory and Thorax: + dyspnea, - cough	  Cardiovascular:	+ELIZONDO, - chest pain  Gastrointestinal: - N/V/D/C  Genitourinary: - dysuria  Musculoskeletal:	 - arthritis  Neurological: - weaknesses  Psychiatric: - depression or anxiety	    MEDICATIONS  (STANDING):  aspirin enteric coated 81 milliGRAM(s) Oral daily  atorvastatin 10 milliGRAM(s) Oral at bedtime  cefuroxime  IVPB 1500 milliGRAM(s) IV Intermittent every 8 hours  cefuroxime  IVPB 1500 milliGRAM(s) IV Intermittent once  clopidogrel Tablet 75 milliGRAM(s) Oral daily  magnesium sulfate  IVPB 2 Gram(s) IV Intermittent once  sodium chloride 0.9% lock flush 3 milliLiter(s) IV Push every 8 hours  sodium chloride 0.9%. 1000 milliLiter(s) (10 mL/Hr) IV Continuous <Continuous>    Allergies  No Known Allergies    SOCIAL HISTORY: Social ETOH, former smoker    FAMILY HISTORY:  FH: myocardial infarction (Mother)    Vital Signs Last 24 Hrs  T(C): 35.5 (02 Aug 2019 14:15), Max: 36.2 (02 Aug 2019 08:54)  T(F): 95.9 (02 Aug 2019 14:15), Max: 97.2 (02 Aug 2019 08:54)  HR: 60 (02 Aug 2019 15:15) (52 - 62)  BP: 155/56 (02 Aug 2019 08:54) (155/56 - 155/56)  RR: 13 (02 Aug 2019 15:15) (10 - 29)  SpO2: 97% (02 Aug 2019 15:15) (94% - 98%)    Physical Exam:  Constitutional: AAOx3, NAD  Neck: supple, No JVD  Cardiovascular: +S1S2 RRR,  soft 2/6 MISSY at LSB  Pulmonary: Coarse breath sounds bilaterally, equal expansion  Abdomen: +BS, soft NTND  Extremities: no edema b/l,   L Groin: Temporary wire with dressing CDI  Neuro: non focal, speech clear, ZHU x 4    LABS:                        10.5   7.72  )-----------( 217      ( 02 Aug 2019 13:53 )             29.4     137  |  105  |  23.0<H>  ----------------------------<  193<H>  4.5   |  21.0<L>  |  1.08  Ca    9.0      02 Aug 2019 13:53  Mg     1.7     08-02  PT/INR - ( 02 Aug 2019 13:53 )   PT: 12.4 sec;   INR: 1.08 ratio    PTT - ( 02 Aug 2019 13:53 )  PTT:66.4 secCARDIAC MARKERS ( 02 Aug 2019 13:53 )  x     / 0.04 ng/mL / 48 U/L / x     / x        RADIOLOGY & ADDITIONAL STUDIES:  TTE: 19  Summary:   1. Technically fair study.   2. Endocardial visualization was enhanced with intravenous echo contrast.   3. Normal global left ventricular systolic function.   4. Left ventricular ejection fraction, by visual estimation, is 55 to 60%.   5. Spectral Doppler shows impaired relaxation pattern of left ventricular myocardial filling (Grade I diastolic dysfunction).   6. There is mild left ventricular hypertrophy.   7. There is no evidence of pericardial effusion.   8. Mild mitral valve regurgitation.   9. Mild thickening of the anterior and posterior mitral valve leaflets.  10. Low velocity, low gradient aortic stenosis.  11. Sclerotic aortic valve with decreased opening.  12. Peak transaortic gradient equals 38.6 mmHg, mean transaortic gradient equals 25.4 mmHg, the calculated aortic valve area equals 0.71 cm² by the continuity equation consistent with severe aortic stenosis.  13. Estimated pulmonary artery systolic pressure is 36.4 mmHg assuming a right atrial pressure of 3 mmHg, which is consistent with borderline pulmonary hypertension.    Cath 19  DIAGNOSTIC IMPRESSIONS: Totally occluded LM  Patent mid RCA stent with moderate proximal RCA stenosis.  Patent MASTERS to LAD  Patent SVG to OM3 and retrogradely filling then whole CX system ( mid 80%  CX stenosis)  Known occluded SVG to RCA ( cath 2017)  Peak to peak gradient on pullback across the aortic valve is 27 mmHg (  likely reflecting moderate aortic stenosis )  Elevated left sided filling pressure ( LVEDP 20 mmHg)  DIAGNOSTIC RECOMMENDATIONS: continue medical therapy for CAD.  Evaluation for AVR ( surgical vs TAVR) based on echo results ( severe low  flow, low gradient aortic stenosis)    Cath 19  DIAGNOSTIC IMPRESSIONS: Severe Aortic Stenosis  Normal Ascending Aorta  Normal Distal aorta, common Iliac and External Iliac Arteries.  Successful TAVR (Diaz Thomas) 29mm via RFA  Successful deployment and Removal of Temporary Pacemaker  Successful Deployment of LFA Closure Device (Angioseal)    CXR 19  Impression:  Status post insertion of prosthetic aortic valve. Pacer wire noted..    EK19  SR at 51bpm; QRSD 102ms; single PAC    A/P  83 year old male patient with a history of HTN, DM, HLD, BERNIE, CAD s/p CABG, admitted for elective TAVR. Patient s/p 29mm Diaz Thomas valve. EF 55-60%. Narrow QRS complex pre op which remains narrow post op.     Telemetry significant for several runs of PM NSVT.    - No indication for emergent pacemaker at this time.  - Patient conducting on their own currently. Pacing threshold at 1mA  - 30 day MCOT can be considered if desired by CTS.   - Continue medical management per CTS. 83 year old male patient with a history of HTN, DM, HLD, BERNIE, CAD s/p CABG, admitted for elective TAVR. Patient reports approximately one month ago onset of dizziness, fatigue and ELIZONDO.  He has a remote admission to [facility] ED (pt resides in [place]) and was admitted and discharged this past .  He was noted to have severe aortic stenosis with preserved LV function. He underwent a successful 29mm Diaz Thomas valve on 19 via RFA.    PAST MEDICAL & SURGICAL HISTORY:  Macular degeneration  Risk factors for obstructive sleep apnea  History of TIAs: Recently diagnosed this 2019 while in ED at [facility]  DM2 (diabetes mellitus, type 2)  CAD (coronary artery disease)  HTN (hypertension)  History of partial knee replacement: Bilateral  Kidney stones  S/P cholecystectomy  S/P CABG (coronary artery bypass graft): CABG x    REVIEW OF SYSTEMS  General: - fever or chills  Skin/Breast: - rashes  Ophthalmologic: - blurred vision	  ENMT: - sore throat  Respiratory and Thorax: + dyspnea, - cough	  Cardiovascular:	+ELIZONDO, - chest pain  Gastrointestinal: - N/V/D/C  Genitourinary: - dysuria  Musculoskeletal:	 - arthritis  Neurological: - weaknesses  Psychiatric: - depression or anxiety	    MEDICATIONS  (STANDING):  aspirin enteric coated 81 milliGRAM(s) Oral daily  atorvastatin 10 milliGRAM(s) Oral at bedtime  cefuroxime  IVPB 1500 milliGRAM(s) IV Intermittent every 8 hours  cefuroxime  IVPB 1500 milliGRAM(s) IV Intermittent once  clopidogrel Tablet 75 milliGRAM(s) Oral daily  magnesium sulfate  IVPB 2 Gram(s) IV Intermittent once  sodium chloride 0.9% lock flush 3 milliLiter(s) IV Push every 8 hours  sodium chloride 0.9%. 1000 milliLiter(s) (10 mL/Hr) IV Continuous <Continuous>    Allergies  No Known Allergies    SOCIAL HISTORY: Social ETOH, former smoker    FAMILY HISTORY:  FH: myocardial infarction (Mother)    Vital Signs Last 24 Hrs  T(C): 35.5 (02 Aug 2019 14:15), Max: 36.2 (02 Aug 2019 08:54)  T(F): 95.9 (02 Aug 2019 14:15), Max: 97.2 (02 Aug 2019 08:54)  HR: 60 (02 Aug 2019 15:15) (52 - 62)  BP: 155/56 (02 Aug 2019 08:54) (155/56 - 155/56)  RR: 13 (02 Aug 2019 15:15) (10 - 29)  SpO2: 97% (02 Aug 2019 15:15) (94% - 98%)    Physical Exam:  Constitutional: AAOx3, NAD  Neck: supple, No JVD  Cardiovascular: +S1S2 RRR,  soft 2/6 MISSY at LSB  Pulmonary: Coarse breath sounds bilaterally, equal expansion  Abdomen: +BS, soft NTND  Extremities: no edema b/l,   L Groin: Temporary wire with dressing CDI  Neuro: non focal, speech clear, ZHU x 4    LABS:                        10.5   7.72  )-----------( 217      ( 02 Aug 2019 13:53 )             29.4     137  |  105  |  23.0<H>  ----------------------------<  193<H>  4.5   |  21.0<L>  |  1.08  Ca    9.0      02 Aug 2019 13:53  Mg     1.7     08-02  PT/INR - ( 02 Aug 2019 13:53 )   PT: 12.4 sec;   INR: 1.08 ratio    PTT - ( 02 Aug 2019 13:53 )  PTT:66.4 secCARDIAC MARKERS ( 02 Aug 2019 13:53 )  x     / 0.04 ng/mL / 48 U/L / x     / x        RADIOLOGY & ADDITIONAL STUDIES:  TTE: 19  Summary:   1. Technically fair study.   2. Endocardial visualization was enhanced with intravenous echo contrast.   3. Normal global left ventricular systolic function.   4. Left ventricular ejection fraction, by visual estimation, is 55 to 60%.   5. Spectral Doppler shows impaired relaxation pattern of left ventricular myocardial filling (Grade I diastolic dysfunction).   6. There is mild left ventricular hypertrophy.   7. There is no evidence of pericardial effusion.   8. Mild mitral valve regurgitation.   9. Mild thickening of the anterior and posterior mitral valve leaflets.  10. Low velocity, low gradient aortic stenosis.  11. Sclerotic aortic valve with decreased opening.  12. Peak transaortic gradient equals 38.6 mmHg, mean transaortic gradient equals 25.4 mmHg, the calculated aortic valve area equals 0.71 cm² by the continuity equation consistent with severe aortic stenosis.  13. Estimated pulmonary artery systolic pressure is 36.4 mmHg assuming a right atrial pressure of 3 mmHg, which is consistent with borderline pulmonary hypertension.    Cath 19  DIAGNOSTIC IMPRESSIONS: Totally occluded LM  Patent mid RCA stent with moderate proximal RCA stenosis.  Patent MASTERS to LAD  Patent SVG to OM3 and retrogradely filling then whole CX system ( mid 80%  CX stenosis)  Known occluded SVG to RCA ( cath 2017)  Peak to peak gradient on pullback across the aortic valve is 27 mmHg (  likely reflecting moderate aortic stenosis )  Elevated left sided filling pressure ( LVEDP 20 mmHg)  DIAGNOSTIC RECOMMENDATIONS: continue medical therapy for CAD.  Evaluation for AVR ( surgical vs TAVR) based on echo results ( severe low  flow, low gradient aortic stenosis)    Cath 19  DIAGNOSTIC IMPRESSIONS: Severe Aortic Stenosis  Normal Ascending Aorta  Normal Distal aorta, common Iliac and External Iliac Arteries.  Successful TAVR (Diaz Thomas) 29mm via RFA  Successful deployment and Removal of Temporary Pacemaker  Successful Deployment of LFA Closure Device (Angioseal)    CXR 19  Impression:  Status post insertion of prosthetic aortic valve. Pacer wire noted..    EK19  SR at 51bpm; QRSD 102ms; single PAC    A/P  83 year old male patient with a history of HTN, DM, HLD, BERNIE, CAD s/p CABG, admitted for elective TAVR. Patient s/p 29mm Diaz Thomas valve. EF 55-60%. Narrow QRS complex pre op which remains narrow post op.     Telemetry significant for several runs of PM NSVT.    - No indication for emergent pacemaker at this time.  - Patient conducting on their own currently. Pacing threshold at 1mA  - 30 day MCOT can be considered if desired by CTS after discharge.   - Continue medical management per CTS. 83 year old male patient with a history of HTN, DM, HLD, BERNIE, CAD s/p CABG, admitted for elective TAVR. Patient reports approximately one month ago onset of dizziness, fatigue and ELIZONDO.  He has a remote admission to [facility] ED (pt resides in [place]) and was admitted and discharged this past .  He was noted to have severe aortic stenosis with preserved LV function. He underwent a successful 29mm Diaz Thomas valve on 19 via RFA.    PAST MEDICAL & SURGICAL HISTORY:  Macular degeneration  Risk factors for obstructive sleep apnea  History of TIAs: Recently diagnosed this 2019 while in ED at [facility]  DM2 (diabetes mellitus, type 2)  CAD (coronary artery disease)  HTN (hypertension)  History of partial knee replacement: Bilateral  Kidney stones  S/P cholecystectomy  S/P CABG (coronary artery bypass graft): CABG x    REVIEW OF SYSTEMS  General: - fever or chills  Skin/Breast: - rashes  Ophthalmologic: - blurred vision	  ENMT: - sore throat  Respiratory and Thorax: + dyspnea, - cough	  Cardiovascular:	+ELIZONDO, - chest pain  Gastrointestinal: - N/V/D/C  Genitourinary: - dysuria  Musculoskeletal:	 - arthritis  Neurological: - weaknesses  Psychiatric: - depression or anxiety	    MEDICATIONS  (STANDING):  aspirin enteric coated 81 milliGRAM(s) Oral daily  atorvastatin 10 milliGRAM(s) Oral at bedtime  cefuroxime  IVPB 1500 milliGRAM(s) IV Intermittent every 8 hours  cefuroxime  IVPB 1500 milliGRAM(s) IV Intermittent once  clopidogrel Tablet 75 milliGRAM(s) Oral daily  magnesium sulfate  IVPB 2 Gram(s) IV Intermittent once  sodium chloride 0.9% lock flush 3 milliLiter(s) IV Push every 8 hours  sodium chloride 0.9%. 1000 milliLiter(s) (10 mL/Hr) IV Continuous <Continuous>    Allergies  No Known Allergies    SOCIAL HISTORY: Social ETOH, former smoker    FAMILY HISTORY:  FH: myocardial infarction (Mother)    Vital Signs Last 24 Hrs  T(C): 35.5 (02 Aug 2019 14:15), Max: 36.2 (02 Aug 2019 08:54)  T(F): 95.9 (02 Aug 2019 14:15), Max: 97.2 (02 Aug 2019 08:54)  HR: 60 (02 Aug 2019 15:15) (52 - 62)  BP: 155/56 (02 Aug 2019 08:54) (155/56 - 155/56)  RR: 13 (02 Aug 2019 15:15) (10 - 29)  SpO2: 97% (02 Aug 2019 15:15) (94% - 98%)    Physical Exam:  Constitutional: AAOx3, NAD  Neck: supple, No JVD  Cardiovascular: +S1S2 RRR,  soft 2/6 MISSY at LSB  Pulmonary: Coarse breath sounds bilaterally, equal expansion  Abdomen: +BS, soft NTND  Extremities: no edema b/l,   L Groin: Temporary wire with dressing CDI  Neuro: non focal, speech clear, ZHU x 4    LABS:                        10.5   7.72  )-----------( 217      ( 02 Aug 2019 13:53 )             29.4     137  |  105  |  23.0<H>  ----------------------------<  193<H>  4.5   |  21.0<L>  |  1.08  Ca    9.0      02 Aug 2019 13:53  Mg     1.7     08-02  PT/INR - ( 02 Aug 2019 13:53 )   PT: 12.4 sec;   INR: 1.08 ratio    PTT - ( 02 Aug 2019 13:53 )  PTT:66.4 secCARDIAC MARKERS ( 02 Aug 2019 13:53 )  x     / 0.04 ng/mL / 48 U/L / x     / x        RADIOLOGY & ADDITIONAL STUDIES:  TTE: 19  Summary:   1. Technically fair study.   2. Endocardial visualization was enhanced with intravenous echo contrast.   3. Normal global left ventricular systolic function.   4. Left ventricular ejection fraction, by visual estimation, is 55 to 60%.   5. Spectral Doppler shows impaired relaxation pattern of left ventricular myocardial filling (Grade I diastolic dysfunction).   6. There is mild left ventricular hypertrophy.   7. There is no evidence of pericardial effusion.   8. Mild mitral valve regurgitation.   9. Mild thickening of the anterior and posterior mitral valve leaflets.  10. Low velocity, low gradient aortic stenosis.  11. Sclerotic aortic valve with decreased opening.  12. Peak transaortic gradient equals 38.6 mmHg, mean transaortic gradient equals 25.4 mmHg, the calculated aortic valve area equals 0.71 cm² by the continuity equation consistent with severe aortic stenosis.  13. Estimated pulmonary artery systolic pressure is 36.4 mmHg assuming a right atrial pressure of 3 mmHg, which is consistent with borderline pulmonary hypertension.    Cath 19  DIAGNOSTIC IMPRESSIONS: Totally occluded LM  Patent mid RCA stent with moderate proximal RCA stenosis.  Patent MASTERS to LAD  Patent SVG to OM3 and retrogradely filling then whole CX system ( mid 80%  CX stenosis)  Known occluded SVG to RCA ( cath 2017)  Peak to peak gradient on pullback across the aortic valve is 27 mmHg (  likely reflecting moderate aortic stenosis )  Elevated left sided filling pressure ( LVEDP 20 mmHg)  DIAGNOSTIC RECOMMENDATIONS: continue medical therapy for CAD.  Evaluation for AVR ( surgical vs TAVR) based on echo results ( severe low  flow, low gradient aortic stenosis)    Cath 19  DIAGNOSTIC IMPRESSIONS: Severe Aortic Stenosis  Normal Ascending Aorta  Normal Distal aorta, common Iliac and External Iliac Arteries.  Successful TAVR (Diaz Thomas) 29mm via RFA  Successful deployment and Removal of Temporary Pacemaker  Successful Deployment of LFA Closure Device (Angioseal)    CXR 19  Impression:  Status post insertion of prosthetic aortic valve. Pacer wire noted..    EK19  SR at 51bpm; QRSD 102ms; single PAC

## 2019-08-02 NOTE — BRIEF OPERATIVE NOTE - COMMENTS
Invasive Lines: Right Radial Arterial Line, Left Femoral Venous Sheath  IV Medication Infusions: None

## 2019-08-02 NOTE — BRIEF OPERATIVE NOTE - NSICDXBRIEFPROCEDURE_GEN_ALL_CORE_FT
PROCEDURES:  TAVR, open femoral artery approach 02-Aug-2019 12:38:00 Transfemoral TAVR via Right Common Femoral Artery Cutdown (29mm Spaien S3) (NCT# [id number]) (STS/ACC TVT Registry Patient ID# [id number]) Bebeto Lott

## 2019-08-02 NOTE — CHART NOTE - NSCHARTNOTEFT_GEN_A_CORE
Commercial 29mm Diaz Thomas S3 Transfemoral TAVR via Right Common Femoral Artery Cutdown.  NCT# [id number], STS/ACC TVT Registry Patient ID# [id number].

## 2019-08-02 NOTE — ASU PREOP CHECKLIST - TAMPON REMOVED
----- Message from Henrique Garrison MD sent at 7/26/2019  8:45 AM CDT -----  Normal result or no major changes. Follow up as scheduled. n/a

## 2019-08-02 NOTE — BRIEF OPERATIVE NOTE - NSICDXBRIEFPOSTOP_GEN_ALL_CORE_FT
POST-OP DIAGNOSIS:  Chronic diastolic CHF (congestive heart failure), NYHA class 2 02-Aug-2019 12:37:52  Bebeto Lott  Severe aortic stenosis 02-Aug-2019 12:37:45  Bebeto Lott

## 2019-08-02 NOTE — BRIEF OPERATIVE NOTE - NSICDXBRIEFPREOP_GEN_ALL_CORE_FT
PRE-OP DIAGNOSIS:  Chronic diastolic CHF (congestive heart failure), NYHA class 2 02-Aug-2019 12:37:33  Bebeto Lott  Severe aortic stenosis 02-Aug-2019 12:37:20  Bebeto Lott

## 2019-08-02 NOTE — CONSULT NOTE ADULT - ATTENDING COMMENTS
S/p TAVR. Pt w/narrow QRS and normal WA interval. No indication for permanent pacing. May d/c temp wire and d/c in AM pending no development of AV block.

## 2019-08-02 NOTE — CONSULT NOTE ADULT - SUBJECTIVE AND OBJECTIVE BOX
Vascular Attending:        HPI:  This is an 83 y.o male with HTN, DM2, CAD s/p CABG, and known carotid stenosis who is known to vascular surgery from prior admission in July where he presented to the ED for dizziness for the past month. Patient received a CTA in the ED which reported R ICA of 50% and L ICA of 70%. Pt found with severe aortic stenosis. Pt was seen by vascular surgery and recommended medical management. 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 His symptoms resolved quickly and he was discharged home soon after.  He returns for elective valve surgery. He is now S/P TAVR and vascular surgery is reconsulted      PAST MEDICAL & SURGICAL HISTORY:  Macular degeneration  Risk factors for obstructive sleep apnea  History of TIAs: Recently diagnosed this July 2019 while in ED at [facility]  DM2 (diabetes mellitus, type 2)  CAD (coronary artery disease)  HTN (hypertension)  History of partial knee replacement: Bilateral  Kidney stones  S/P cholecystectomy  S/P CABG (coronary artery bypass graft): CABG x      REVIEW OF SYSTEMS  General: fatigue  Skin/Breast: denies	  Ophthalmologic: denies double/blurred vision	  ENMT:	denies  Respiratory and Thorax: denies SOB or cough	  Cardiovascular:	denies CP or palps  Gastrointestinal:	denies abd pain, N/V or change in bowel habits  Musculoskeletal:	 CLBP  Neurological:	no deficits      MEDICATIONS  (STANDING):  aspirin enteric coated 81 milliGRAM(s) Oral daily  atorvastatin 10 milliGRAM(s) Oral at bedtime  cefuroxime  IVPB 1500 milliGRAM(s) IV Intermittent every 8 hours  cefuroxime  IVPB 1500 milliGRAM(s) IV Intermittent once  clopidogrel Tablet 75 milliGRAM(s) Oral daily  sodium chloride 0.9% lock flush 3 milliLiter(s) IV Push every 8 hours  sodium chloride 0.9%. 1000 milliLiter(s) (10 mL/Hr) IV Continuous <Continuous>    MEDICATIONS  (PRN):      Allergies  No Known Allergies    SOCIAL HISTORY: , Retired, works part time at local [employer]. Former smoker quit 40 y/a      Vital Signs Last 24 Hrs  T(C): 35 (02 Aug 2019 13:15), Max: 36.2 (02 Aug 2019 08:54)  T(F): 95 (02 Aug 2019 13:15), Max: 97.2 (02 Aug 2019 08:54)  HR: 56 (02 Aug 2019 13:15) (52 - 58)  BP: 155/56 (02 Aug 2019 08:54) (155/56 - 155/56)  BP(mean): --  RR: 12 (02 Aug 2019 13:15) (11 - 20)  SpO2: 96% (02 Aug 2019 13:15) (96% - 98%)    PHYSICAL EXAM:    Constitutional: WD< WN M in NAD  Eyes: PERRL  ENMT: WNL  Neck: No JVD or carotid bruiits  Respiratory: CTA  Cardiovascular: normal S1, S2  Gastrointestinal: soft, ND, NT  Genitourinary: alexandra with clear yellow urine  Extremities: BLE WWP  Neurological: A&O X3 speech fluent, face symmetrical, tongue midline. ZHU X 4=    LABS:                        10.5   7.72  )-----------( 217      ( 02 Aug 2019 13:53 )             29.4     08-02    137  |  105  |  23.0<H>  ----------------------------<  193<H>  4.5   |  21.0<L>  |  1.08    Ca    9.0      02 Aug 2019 13:53  Mg     1.7     08-02      RADIOLOGY & ADDITIONAL STUDIES  < from: MR Head No Cont (07.23.19 @ 15:59) >  EXAM:  MR BRAIN                          PROCEDURE DATE:  07/23/2019          INTERPRETATION:  CLINICAL STATEMENT: Expressive aphasia    TECHNIQUE: MRI of the brain was performed without gadolinium.    COMPARISON: CT head 7/22/2019    FINDINGS:  There is mild diffuse parenchymal volume loss. There are T2 prolongation   signal abnormalities in the periventricular subcortical white matter   likely related to mild chronic microvascular ischemic changes.    Punctate acute/subacute infarct noted high left posterior medial frontal   lobe.    There is no acute parenchymal hemorrhage, parenchymal mass, mass effect   or midline shift. There is no extra-axial fluid collection.  There is no   hydrocephalus.    Mucosal thickening paranasal sinuses    IMPRESSION:  No acute intracranial hemorrhage.    Punctate acute/subacute infarct high left posterior medial frontal lobe    < end of copied text >    < from: CT Angio Neck w/ IV Cont (07.20.19 @ 12:29) >  EXAM:  CT ANGIO NECK (W)AW IC                         EXAM:  CT ANGIO BRAIN (W)AW IC                          PROCEDURE DATE:  07/20/2019          INTERPRETATION:  CT angiogram head and neck with and without contrast    History syncope and dizziness    Multiplanar MIPS included    Contrast Omnipaque 94 cc    Precontrast CT of the brain is negative for hemorrhage, cortical edema,   mass effect or hydrocephalus.    Examination of the intracranial circulation is negative for focal   occlusion, aneurysm or significant stenosis.    Examination of the cervical circulation demonstrates heavy bilateral   carotid bulb calcification, limiting specificity with estimated 50 %   stenosis on the right and 70% stenosis on the left. The vertebral   arteries are codominant and both patent. There is no occlusion or   dissection.    IMPRESSION:    No acute findings. Bilateral ICA stenosis as above      < end of copied text >  < from: US Duplex Carotid Arteries Complete, Bilateral (07.21.19 @ 12:07) >     EXAM:  US DPLX CAROTIDS COMPL BI                          PROCEDURE DATE:  07/21/2019          INTERPRETATION:  History: Dizziness. History of carotid stenosis.    Findings: Grayscale, color and spectral Doppler examination of both   carotid arteries was performed.     Correlation made with recent CT 7/20/2019. Reported prior studies not   available for comparison.    Grayscale imaging reveals moderate calcified plaque mid through distal   common carotid artery, carotid bulb and proximal internal carotid artery   bilaterally.    Blood flow velocities are as follows:    RIGHT:    PROX CCA = 77 ;  DIST CCA = 96 ;  PROX ICA = 85 ;  DIST ICA =   106 ;  ECA = 421    ICA/CCA ratio 1.1    LEFT   :    PROX CCA = 84 ;  DIST CCA = 98 ;  PROX ICA = 132 ;  DIST ICA   = 139 ;  ECA = 552    ICA/CCA ratio 1.4    Impression: 52 to 69 % stenosis left internal carotid artery by peak   systolic velocity criteria.        < end of copied text >    Impression and Plan:83 y.o male with HTN, DM2, CAD s/p CABG, and known carotid stenosis who is known to vascular surgery from prior admission with c/o dizziness which was likely from low BP and or bradycardia from beta blocker superimposed on vascular disease. No vascular surgical intervention indicated at that time with asymptomatic carotid stenosis.   Now with evidence of L CVA  Will have Dr Atkins review prior studies  Further recommendations to follow

## 2019-08-02 NOTE — CONSULT NOTE ADULT - ASSESSMENT
83 year old male patient with a history of HTN, DM, HLD, BERNIE, CAD s/p CABG, admitted for elective TAVR. Patient s/p 29mm Diaz Thomas valve. EF 55-60%. Narrow QRS complex pre op which remains narrow post op.     Telemetry significant for several runs of PM NSVT 2/2 catheter whip.    - No indication for emergent pacemaker at this time.  - Patient conducting on their own currently. Pacing threshold at 1mA  - 30 day MCOT can be considered if desired by CTS after discharge.   - Continue medical management per CTS.

## 2019-08-03 ENCOUNTER — TRANSCRIPTION ENCOUNTER (OUTPATIENT)
Age: 84
End: 2019-08-03

## 2019-08-03 VITALS
HEART RATE: 71 BPM | RESPIRATION RATE: 31 BRPM | OXYGEN SATURATION: 99 % | DIASTOLIC BLOOD PRESSURE: 66 MMHG | SYSTOLIC BLOOD PRESSURE: 152 MMHG

## 2019-08-03 LAB
ANION GAP SERPL CALC-SCNC: 12 MMOL/L — SIGNIFICANT CHANGE UP (ref 5–17)
BUN SERPL-MCNC: 24 MG/DL — HIGH (ref 8–20)
CALCIUM SERPL-MCNC: 8.9 MG/DL — SIGNIFICANT CHANGE UP (ref 8.6–10.2)
CHLORIDE SERPL-SCNC: 105 MMOL/L — SIGNIFICANT CHANGE UP (ref 98–107)
CK SERPL-CCNC: 43 U/L — SIGNIFICANT CHANGE UP (ref 30–200)
CO2 SERPL-SCNC: 19 MMOL/L — LOW (ref 22–29)
CREAT SERPL-MCNC: 1.06 MG/DL — SIGNIFICANT CHANGE UP (ref 0.5–1.3)
GLUCOSE SERPL-MCNC: 146 MG/DL — HIGH (ref 70–115)
HCT VFR BLD CALC: 28.2 % — LOW (ref 39–50)
HGB BLD-MCNC: 10.1 G/DL — LOW (ref 13–17)
MAGNESIUM SERPL-MCNC: 2 MG/DL — SIGNIFICANT CHANGE UP (ref 1.6–2.6)
MCHC RBC-ENTMCNC: 33.3 PG — SIGNIFICANT CHANGE UP (ref 27–34)
MCHC RBC-ENTMCNC: 35.8 GM/DL — SIGNIFICANT CHANGE UP (ref 32–36)
MCV RBC AUTO: 93.1 FL — SIGNIFICANT CHANGE UP (ref 80–100)
PLATELET # BLD AUTO: 217 K/UL — SIGNIFICANT CHANGE UP (ref 150–400)
POTASSIUM SERPL-MCNC: 4 MMOL/L — SIGNIFICANT CHANGE UP (ref 3.5–5.3)
POTASSIUM SERPL-SCNC: 4 MMOL/L — SIGNIFICANT CHANGE UP (ref 3.5–5.3)
RBC # BLD: 3.03 M/UL — LOW (ref 4.2–5.8)
RBC # FLD: 11.9 % — SIGNIFICANT CHANGE UP (ref 10.3–14.5)
SODIUM SERPL-SCNC: 136 MMOL/L — SIGNIFICANT CHANGE UP (ref 135–145)
TROPONIN T SERPL-MCNC: 0.04 NG/ML — SIGNIFICANT CHANGE UP (ref 0–0.06)
WBC # BLD: 10.04 K/UL — SIGNIFICANT CHANGE UP (ref 3.8–10.5)
WBC # FLD AUTO: 10.04 K/UL — SIGNIFICANT CHANGE UP (ref 3.8–10.5)

## 2019-08-03 PROCEDURE — 71045 X-RAY EXAM CHEST 1 VIEW: CPT

## 2019-08-03 PROCEDURE — 82550 ASSAY OF CK (CPK): CPT

## 2019-08-03 PROCEDURE — 83735 ASSAY OF MAGNESIUM: CPT

## 2019-08-03 PROCEDURE — 82962 GLUCOSE BLOOD TEST: CPT

## 2019-08-03 PROCEDURE — 84295 ASSAY OF SERUM SODIUM: CPT

## 2019-08-03 PROCEDURE — 82330 ASSAY OF CALCIUM: CPT

## 2019-08-03 PROCEDURE — C1769: CPT

## 2019-08-03 PROCEDURE — 82435 ASSAY OF BLOOD CHLORIDE: CPT

## 2019-08-03 PROCEDURE — C1760: CPT

## 2019-08-03 PROCEDURE — 93325 DOPPLER ECHO COLOR FLOW MAPG: CPT

## 2019-08-03 PROCEDURE — 99238 HOSP IP/OBS DSCHRG MGMT 30/<: CPT

## 2019-08-03 PROCEDURE — 80048 BASIC METABOLIC PNL TOTAL CA: CPT

## 2019-08-03 PROCEDURE — C1887: CPT

## 2019-08-03 PROCEDURE — 71045 X-RAY EXAM CHEST 1 VIEW: CPT | Mod: 26

## 2019-08-03 PROCEDURE — 85014 HEMATOCRIT: CPT

## 2019-08-03 PROCEDURE — P9045: CPT

## 2019-08-03 PROCEDURE — C1889: CPT

## 2019-08-03 PROCEDURE — 36415 COLL VENOUS BLD VENIPUNCTURE: CPT

## 2019-08-03 PROCEDURE — 82803 BLOOD GASES ANY COMBINATION: CPT

## 2019-08-03 PROCEDURE — 85730 THROMBOPLASTIN TIME PARTIAL: CPT

## 2019-08-03 PROCEDURE — 93880 EXTRACRANIAL BILAT STUDY: CPT

## 2019-08-03 PROCEDURE — 85610 PROTHROMBIN TIME: CPT

## 2019-08-03 PROCEDURE — C1894: CPT

## 2019-08-03 PROCEDURE — 76000 FLUOROSCOPY <1 HR PHYS/QHP: CPT

## 2019-08-03 PROCEDURE — 93312 ECHO TRANSESOPHAGEAL: CPT

## 2019-08-03 PROCEDURE — 85027 COMPLETE CBC AUTOMATED: CPT

## 2019-08-03 PROCEDURE — 93005 ELECTROCARDIOGRAM TRACING: CPT

## 2019-08-03 PROCEDURE — 93306 TTE W/DOPPLER COMPLETE: CPT

## 2019-08-03 PROCEDURE — 84484 ASSAY OF TROPONIN QUANT: CPT

## 2019-08-03 PROCEDURE — 84132 ASSAY OF SERUM POTASSIUM: CPT

## 2019-08-03 PROCEDURE — 93010 ELECTROCARDIOGRAM REPORT: CPT

## 2019-08-03 PROCEDURE — 93320 DOPPLER ECHO COMPLETE: CPT

## 2019-08-03 PROCEDURE — 82947 ASSAY GLUCOSE BLOOD QUANT: CPT

## 2019-08-03 PROCEDURE — 83605 ASSAY OF LACTIC ACID: CPT

## 2019-08-03 RX ORDER — ALBUMIN HUMAN 25 %
500 VIAL (ML) INTRAVENOUS ONCE
Refills: 0 | Status: COMPLETED | OUTPATIENT
Start: 2019-08-03 | End: 2019-08-03

## 2019-08-03 RX ORDER — AMLODIPINE BESYLATE 2.5 MG/1
10 TABLET ORAL ONCE
Refills: 0 | Status: COMPLETED | OUTPATIENT
Start: 2019-08-03 | End: 2019-08-03

## 2019-08-03 RX ADMIN — AMLODIPINE BESYLATE 10 MILLIGRAM(S): 2.5 TABLET ORAL at 12:01

## 2019-08-03 RX ADMIN — Medication 100 MILLIGRAM(S): at 11:14

## 2019-08-03 RX ADMIN — Medication 250 MILLILITER(S): at 11:14

## 2019-08-03 RX ADMIN — SODIUM CHLORIDE 3 MILLILITER(S): 9 INJECTION INTRAMUSCULAR; INTRAVENOUS; SUBCUTANEOUS at 05:53

## 2019-08-03 RX ADMIN — CLOPIDOGREL BISULFATE 75 MILLIGRAM(S): 75 TABLET, FILM COATED ORAL at 12:01

## 2019-08-03 RX ADMIN — Medication 100 MILLIGRAM(S): at 02:02

## 2019-08-03 RX ADMIN — Medication 81 MILLIGRAM(S): at 12:01

## 2019-08-03 NOTE — DISCHARGE NOTE NURSING/CASE MANAGEMENT/SOCIAL WORK - NSDCDPATPORTLINK_GEN_ALL_CORE
You can access the "[employer] Patient Portal, offered by [facility], by registering with the following website: [email or web address]

## 2019-08-03 NOTE — DISCHARGE NOTE PROVIDER - NSDCACTIVITY_GEN_ALL_CORE
Stairs allowed/Walking - Outdoors allowed/Walking - Indoors allowed/No heavy lifting/straining/Do not drive or operate machinery

## 2019-08-03 NOTE — DISCHARGE NOTE PROVIDER - CARE PROVIDER_API CALL
Sky Harris)  Vascular Surgery  [address] [facility], [place] [address]  Phone: [phone]  Fax: [phone]  Follow Up Time: 1 week Wilber Cabrera)  Surgery; Thoracic and Cardiac Surgery  [address] [place] [address]  Phone: [phone]  Fax: [phone]  Follow Up Time: 2 weeks

## 2019-08-03 NOTE — DISCHARGE NOTE PROVIDER - HOSPITAL COURSE
83M with complaints of dizziness.  f/w Aortic stenosis, referred for TAVR.  on 9/2 underwent transfemoral TAVR with Right femoral artery cutdown with a peter valve.  Post operative course uneventful.  Vascular team consulted for left carotid artery disease.  TTE post procedure performed.  Patient stable for discharge.

## 2019-08-03 NOTE — PROGRESS NOTE ADULT - ASSESSMENT
Impression and Plan:83 y.o male with HTN, DM2, CAD s/p CABG, and known carotid stenosis who is known to vascular surgery from prior admission with c/o dizziness which was likely from low BP and or bradycardia from beta blocker superimposed on vascular disease. No vascular surgical intervention indicated at that time with asymptomatic carotid stenosis. Patient is sp TVAR 8/2 patient will likely     - no vascular surgery intervention required at this time  - please continue to follow with outpatient cardiologist at home in [place] for repeat carotid duplex US ~q3mo  - continue home plavix  - patient okay for discharge from vascular surgery perspective

## 2019-08-03 NOTE — PROGRESS NOTE ADULT - SUBJECTIVE AND OBJECTIVE BOX
This is an 83 y.o male with HTN, DM2, CAD s/p CABG, and known carotid stenosis who is known to vascular surgery from prior admission in July where he presented to the ED for dizziness for the past month. Patient received a CTA in the ED which reported R ICA of 50% and L ICA of 70%. Pt found with severe aortic stenosis. Pt was seen by vascular surgery and recommended medical management. 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 His symptoms resolved quickly and he was discharged home soon after.  He returns for elective valve surgery. He is now S/P TAVR and vascular surgery is reconsulted    HPI/OVERNIGHT EVENTS: Patient seen and examined at bedside this AM. Nursing reports   Denies fever, chills, nausea, vomitting, chest pain, SOB, dizziness, abd pain or any other concerning symptoms    Vital Signs Last 24 Hrs  T(C): 36.6 (03 Aug 2019 12:25), Max: 36.9 (03 Aug 2019 10:00)  T(F): 97.9 (03 Aug 2019 12:25), Max: 98.4 (03 Aug 2019 10:00)  HR: 72 (03 Aug 2019 12:25) (56 - 85)  BP: 157/69 (03 Aug 2019 12:25) (125/58 - 174/72)  BP(mean): 99 (03 Aug 2019 12:25) (83 - 106)  RR: 25 (03 Aug 2019 12:25) (8 - 37)  SpO2: 99% (03 Aug 2019 12:25) (94% - 100%)    I&O's Detail    02 Aug 2019 07:01  -  03 Aug 2019 07:00  --------------------------------------------------------  IN:    Albumin 5%  - 250 mL: 250 mL    Oral Fluid: 240 mL    sodium chloride 0.9%: 60 mL    Solution: 100 mL    Solution: 50 mL  Total IN: 700 mL    OUT:    Indwelling Catheter - Urethral: 1705 mL  Total OUT: 1705 mL    Total NET: -1005 mL      03 Aug 2019 07:01  -  03 Aug 2019 13:05  --------------------------------------------------------  IN:    Albumin 5%  - 500 mL: 500 mL    Solution: 50 mL  Total IN: 550 mL    OUT:    Voided: 250 mL  Total OUT: 250 mL    Total NET: 300 mL    Constitutional: WD< WN M in NAD  Eyes: PERRL  ENMT: WNL  Neck: No JVD or carotid bruiits  Respiratory: CTA  Cardiovascular: normal S1, S2  Gastrointestinal: soft, ND, NT  Genitourinary: alexandra out  Extremities: BLE WWP  Neurological: A&O X3 speech fluent, face symmetrical, tongue midline                 10.1   10.04 )-----------( 217      ( 03 Aug 2019 02:35 )             28.2     08-03    136  |  105  |  24.0<H>  ----------------------------<  146<H>  4.0   |  19.0<L>  |  1.06    Ca    8.9      03 Aug 2019 02:35  Mg     2.0     08-03      PT/INR - ( 02 Aug 2019 13:53 )   PT: 12.4 sec;   INR: 1.08 ratio         PTT - ( 02 Aug 2019 13:53 )  PTT:66.4 sec      MEDICATIONS  (STANDING):  aspirin enteric coated 81 milliGRAM(s) Oral daily  clopidogrel Tablet 75 milliGRAM(s) Oral daily    MEDICATIONS  (PRN):

## 2019-08-03 NOTE — DISCHARGE NOTE NURSING/CASE MANAGEMENT/SOCIAL WORK - NSDCPEPT PROEDHF_GEN_ALL_CORE
Report signs and symptoms to primary care provider/Activities as tolerated/Low salt diet/Call primary care provider for follow up after discharge/Monitor weight daily

## 2019-08-03 NOTE — DISCHARGE NOTE PROVIDER - NSDCFUADDINST_GEN_ALL_CORE_FT
call cardiac surgery office at [phone] on Monday to schedule outpatient appointment with Dr. soto.      Please come to ED or Call Cardio thoracic office at [phone] if Chest pain, Shortness of Breath, persistant Nausea & vomiting, oozing from wounds, 2 lb increase in weight in 24 hours. call cardiac surgery office at [phone] on Monday to schedule outpatient appointment with Dr. soto.      Per Dr. Harris vascular surgeon, please have follow up appointment and ultrasound with cardiologist and vascular surgeon in [place] within the next 30 days.      Please come to ED or Call Cardio thoracic office at [phone] if Chest pain, Shortness of Breath, persistant Nausea & vomiting, oozing from wounds, 2 lb increase in weight in 24 hours.

## 2019-08-03 NOTE — DISCHARGE NOTE PROVIDER - PROVIDER TOKENS
PROVIDER:[TOKEN:[[id number]],FOLLOWUP:[1 week]] PROVIDER:[TOKEN:[[id number]],FOLLOWUP:[2 weeks]]

## 2019-08-03 NOTE — DISCHARGE NOTE PROVIDER - NSDCCPCAREPLAN_GEN_ALL_CORE_FT
PRINCIPAL DISCHARGE DIAGNOSIS  Diagnosis: Non-rheumatic aortic stenosis  Assessment and Plan of Treatment: 1. Take ALL of your medications as ordered. Fill your prescriptions the day you are discharged and take according to the schedule you were given. Continue to take a stool softener if you are taking narcotic pain medications. AVOID medications such as ibuprofen or naproxen if you have had bypass surgery. If you have any questions or are unable to fill the prescriptions, please call the office right away at [phone].  2. Shower daily. Clean all incisions daily while showering with warm water and mild soap, pat dry with a clean towel and do not cover with any dressings unless instructed to. No bathing, swimming in a pool or the ocean until instructed by MD.  DO NOT use creams or lotions on the wound.  3. We advise that you do not drive until instructed by MD. Sit in the rear passenger seat with the red pillow between chest and seatbelt to avoid injury in the event of a motor vehicle accident until you see the surgeon again in the office.  4. You may not return to work until instructed by MD.   5. Please eat a low fat, low cholesterol, low salt diet. (No added/extra salt). A nutritional supplement like Ensure or Glucerna (for diabetics) may help you reach your nutritional goals after surgery and aid in your recovery.  6. Weigh yourself every day in the morning and record it in the weight log in your red folder. Notify the office of any weight gain more than 2-3 pounds in 24 hours.  **Please LIMIT your fluid intake to less than a liter a day.**  7. Continue breathing exercises several times a day. Continue to use your heart pillow when coughing.  8. No heavy lifting nothing greater than 5 pounds until cleared by MD. We recommend that you sleep on your back and not your side or stomach for the next 4-6 weeks.  9. Call / Notify MD any fever greater than 101.0, any drainage from incisions or if they become red, hot or very tender to the touch.  10. Increase activity as tolerated. Walk indoors and/or outdoors at least 3 times a day.        SECONDARY DISCHARGE DIAGNOSES  Diagnosis: Carotid artery stenosis  Assessment and Plan of Treatment: follow up [facility] vascular surgery    Diagnosis: CAD (coronary artery disease)  Assessment and Plan of Treatment: take medications as prescribed

## 2019-08-03 NOTE — DISCHARGE NOTE PROVIDER - CARE PROVIDERS DIRECT ADDRESSES
[email or web address]

## 2019-08-06 PROBLEM — Z91.89 OTHER SPECIFIED PERSONAL RISK FACTORS, NOT ELSEWHERE CLASSIFIED: Chronic | Status: ACTIVE | Noted: 2019-07-29

## 2019-08-06 PROBLEM — H35.30 UNSPECIFIED MACULAR DEGENERATION: Chronic | Status: ACTIVE | Noted: 2019-07-29

## 2019-08-06 PROBLEM — Z86.73 PERSONAL HISTORY OF TRANSIENT ISCHEMIC ATTACK (TIA), AND CEREBRAL INFARCTION WITHOUT RESIDUAL DEFICITS: Chronic | Status: ACTIVE | Noted: 2019-07-29

## 2019-08-06 NOTE — HISTORY OF PRESENT ILLNESS
[FreeTextEntry1] : This is a 83 year old male visiting from [place] who presented in the ER with complaints of increasing dizziness with some relief when resting. History of hypertension, Type 2 diabetic, CAD with CABG in the past. Following cardiac cath patient had a TIA with expressive aphasia which quickly resolved, was seen by neurology and cleared. ECHO from 7/22/19 showed peak transaortic gradient equaling 38.6 mmHg.

## 2019-08-06 NOTE — CONSULT LETTER
[Dear  ___] : Dear  [unfilled], [Consult Letter:] : I had the pleasure of evaluating your patient, [unfilled]. [Please see my note below.] : Please see my note below. [Consult Closing:] : Thank you very much for allowing me to participate in the care of this patient.  If you have any questions, please do not hesitate to contact me. [Sincerely,] : Sincerely, [FreeTextEntry2] : Dr. Kun Partida [FreeTextEntry3] : Dr. Wilber Cabrera \par Chief, Cardiovascular Surgery at [facility]\par System Director of Surgical Heart Failure\par Professor\par Cardiovascular and Thoracic Surgery\par [employer]\par [facility]\par [address] [place] [address]\par [place] [id number]\par

## 2019-08-06 NOTE — REVIEW OF SYSTEMS
[Heart Rate Is Slow] : slow heart rate [Feeling Tired] : feeling tired [SOB on Exertion] : shortness of breath during exertion [Negative] : Heme/Lymph [Chest Pain] : no chest pain [Palpitations] : no palpitations [Lower Ext Edema] : no extremity edema [de-identified] : dizzy spells

## 2019-08-06 NOTE — PHYSICAL EXAM
[General Appearance - Alert] : alert [General Appearance - Well Nourished] : well nourished [Neck Appearance] : the appearance of the neck was normal [] : no respiratory distress [Murmurs] : no murmurs [Examination Of The Chest] : the chest was normal in appearance [Oriented To Time, Place, And Person] : oriented to person, place, and time [No Focal Deficits] : no focal deficits

## 2019-08-06 NOTE — ASSESSMENT
[FreeTextEntry1] : Very pleasant 83-year-old gentleman with severe critical aortic stenosis. The patient is very symptomatic with shortness of breath on minimal exertion. The patient has multiple risk factors including his age, previous open her surgery, and decreased functional condition. As a result, the patient is a moderate risk for open heart surgery, and therefore he would benefit from a transcatheter aortic valve replacement.

## 2019-08-07 ENCOUNTER — APPOINTMENT (OUTPATIENT)
Dept: CARE COORDINATION | Facility: HOME HEALTH | Age: 84
End: 2019-08-07

## 2019-08-07 VITALS
HEIGHT: 70 IN | BODY MASS INDEX: 22.48 KG/M2 | RESPIRATION RATE: 15 BRPM | WEIGHT: 157 LBS | SYSTOLIC BLOOD PRESSURE: 130 MMHG | HEART RATE: 72 BPM | DIASTOLIC BLOOD PRESSURE: 78 MMHG | OXYGEN SATURATION: 99 %

## 2019-08-07 RX ORDER — OMEPRAZOLE 20 MG/1
20 TABLET, DELAYED RELEASE ORAL
Refills: 0 | Status: DISCONTINUED | COMMUNITY
End: 2019-08-07

## 2019-08-07 RX ORDER — METOPROLOL TARTRATE 25 MG/1
25 TABLET, FILM COATED ORAL TWICE DAILY
Qty: 60 | Refills: 0 | Status: DISCONTINUED | COMMUNITY
End: 2019-08-07

## 2019-08-07 NOTE — REVIEW OF SYSTEMS
[Vomiting] : no vomiting [Abdominal Pain] : no abdominal pain [Constipation] : constipation [Fainting] : no fainting [Dizziness] : dizziness [Confused] : no confusion [Difficulty Walking] : no difficulty walking [Negative] : Musculoskeletal [de-identified] : soreness to sacrum/buttock area, excoriation noted [FreeTextEntry7] : last BM two days ago [de-identified] : transient

## 2019-08-07 NOTE — HISTORY OF PRESENT ILLNESS
[FreeTextEntry1] : 83 YOM with pmhx including AS, CAD with prior CABG, HTN, DMII, recent TIA, dizziness. Pt underwent TAVR with Dr. Cabrera on 8/1/19. Post op course with LBBB, otherwise unremarkable. Pt discharged home to son's home in [place] with support of wife. \par Initial [facility] visit \par CC "I still get dizzy some times, I just don't know why"

## 2019-08-07 NOTE — ASSESSMENT
[FreeTextEntry1] : Pt recovering well at home. Reviewed all medications and dosages with pt and pt spouse understanding. Pt has all medications in home and is taking as prescribed. Pain controlled with current medication regimen with Tylenol PRN. Reviewed post procedure expectations at length with pt and pt spouse. Pt often feels tired and not like walking, wife encouraging pt to walk daily, reinforced importance of ambulation. Pt also with decreased appetite, advised small more frequent meals, pt states there is some improvement today. He does endorse improvement of ELIZONDO, but states "I get dizzy at times still" Pt is visibly discouraged by this. Overnight he attempted to get out of bed to urinate without turning the lights on and pt became dizzy then slid down the wall and landed on his buttocks. He did not strike his head. He did not lose consciousness according to wife, he got dizzy and didn't know what to do so he "kind of fell/sat to the floor" No injuries sustained. Pt states his dizziness improved after that and he has not had those symptoms again. Safety reinforced with pt, pt is in unfamiliar environment at son's home. Advised to use lights whenever ambulating especially overnight and to change positions slowly. pt with understanding. Pt has been having dizzy issues for several weeks, prior to surgery as well. \par Pt also with constipation, using Benefiber, advised to use Sennakot for mild constipation relief. Pt also advised to use barrier cream to excoriated buttocks such as Aquaphor or Butt Paste. Pt wife to  from store. \par Emotional support provided. Pt encouraged he will return home to [place] soon, once he completes his follow up with Dr. Cabrera. \par All questions answered with pt and pt spouse understanding.

## 2019-08-07 NOTE — REASON FOR VISIT
[Follow-Up: _____] : a [unfilled] follow-up visit [Spouse] : spouse [FreeTextEntry1] : FOLLOW YOUR HEART- Transitional Care Management Program- [facility]\par \par

## 2019-08-07 NOTE — PHYSICAL EXAM
[Neck Appearance] : the appearance of the neck was normal [Sclera] : the sclera and conjunctiva were normal [] : no respiratory distress [Respiration, Rhythm And Depth] : normal respiratory rhythm and effort [Auscultation Breath Sounds / Voice Sounds] : lungs were clear to auscultation bilaterally [Apical Impulse] : the apical impulse was normal [Heart Sounds] : normal S1 and S2 [Examination Of The Chest] : the chest was normal in appearance [Heart Rate And Rhythm] : heart rate was normal and rhythm regular [FreeTextEntry2] : B/L LE without edema, B/L calves soft, NT [1+] : left 1+ [Bowel Sounds] : normal bowel sounds [Abdomen Soft] : soft [Abdomen Tenderness] : non-tender [Abnormal Walk] : normal gait [Musculoskeletal - Swelling] : no joint swelling seen [Skin Turgor] : normal skin turgor [FreeTextEntry1] : Bilateral buttock cheeks with excoriated dry skin. No open wounds at this time. Pt has some mild irritation to chest where hair was shaved. Not pruritic, just slight maculopapular rash, significant improvement since hospital according to wife. Right groing incision without erythema, warmth or drainage, edges well approximated. left groin site unremarkable. Non tender [Skin Color & Pigmentation] : normal skin color and pigmentation [Impaired Insight] : insight and judgment were intact [Oriented To Time, Place, And Person] : oriented to person, place, and time [Affect] : the affect was normal

## 2019-08-08 ENCOUNTER — APPOINTMENT (OUTPATIENT)
Dept: CARDIOTHORACIC SURGERY | Facility: CLINIC | Age: 84
End: 2019-08-08
Payer: MEDICARE

## 2019-08-08 ENCOUNTER — NON-APPOINTMENT (OUTPATIENT)
Age: 84
End: 2019-08-08

## 2019-08-08 VITALS
HEART RATE: 60 BPM | WEIGHT: 157 LBS | RESPIRATION RATE: 16 BRPM | OXYGEN SATURATION: 99 % | HEIGHT: 70 IN | BODY MASS INDEX: 22.48 KG/M2

## 2019-08-08 PROCEDURE — 99212 OFFICE O/P EST SF 10 MIN: CPT

## 2019-08-08 NOTE — PHYSICAL EXAM
[Respiration, Rhythm And Depth] : normal respiratory rhythm and effort [Clean] : clean [Heart Rate And Rhythm] : heart rate was normal and rhythm regular [Healing Well] : healing well [Dry] : dry

## 2019-08-08 NOTE — COUNSELING
[Hygeine (Including Daily Shower)] : hygeine (including daily shower) [No Heavy Lifting] : no heavy lifting (>15-20 lb. for 1 month or 25 lb. for 3 months from date of surgery) [Importance of Regular Medical Follow-Up] : the importance of regular medical follow-up [S/S of infection] : signs and symptoms of infection (and to whom it should be reported) [Blood Pressure Control] : blood pressure control [Progressive Ambulation/Activity] : progressive ambulation/activity [Medication/Vitamin/Herb/Food Interaction] : medication/vitamin/herb/food interaction

## 2019-08-10 ENCOUNTER — TRANSCRIPTION ENCOUNTER (OUTPATIENT)
Age: 84
End: 2019-08-10

## 2019-08-10 ENCOUNTER — EMERGENCY (EMERGENCY)
Facility: HOSPITAL | Age: 84
LOS: 1 days | Discharge: HOME | End: 2019-08-10
Attending: EMERGENCY MEDICINE
Payer: MEDICARE

## 2019-08-10 VITALS
OXYGEN SATURATION: 100 % | RESPIRATION RATE: 16 BRPM | TEMPERATURE: 98 F | HEIGHT: 67 IN | WEIGHT: 149.91 LBS | SYSTOLIC BLOOD PRESSURE: 136 MMHG | DIASTOLIC BLOOD PRESSURE: 68 MMHG | HEART RATE: 75 BPM

## 2019-08-10 DIAGNOSIS — Z90.49 ACQUIRED ABSENCE OF OTHER SPECIFIED PARTS OF DIGESTIVE TRACT: Chronic | ICD-10-CM

## 2019-08-10 DIAGNOSIS — Z96.659 PRESENCE OF UNSPECIFIED ARTIFICIAL KNEE JOINT: Chronic | ICD-10-CM

## 2019-08-10 DIAGNOSIS — Z95.1 PRESENCE OF AORTOCORONARY BYPASS GRAFT: Chronic | ICD-10-CM

## 2019-08-10 DIAGNOSIS — N20.0 CALCULUS OF KIDNEY: Chronic | ICD-10-CM

## 2019-08-10 LAB
APPEARANCE UR: CLEAR — SIGNIFICANT CHANGE UP
BACTERIA # UR AUTO: ABNORMAL
BILIRUB UR-MCNC: NEGATIVE — SIGNIFICANT CHANGE UP
COLOR SPEC: YELLOW — SIGNIFICANT CHANGE UP
DIFF PNL FLD: ABNORMAL
EPI CELLS # UR: NEGATIVE — SIGNIFICANT CHANGE UP
GLUCOSE UR QL: NEGATIVE MG/DL — SIGNIFICANT CHANGE UP
KETONES UR-MCNC: NEGATIVE — SIGNIFICANT CHANGE UP
LEUKOCYTE ESTERASE UR-ACNC: ABNORMAL
NITRITE UR-MCNC: POSITIVE
PH UR: 5 — SIGNIFICANT CHANGE UP (ref 5–8)
PROT UR-MCNC: 15 MG/DL
RBC CASTS # UR COMP ASSIST: ABNORMAL /HPF (ref 0–4)
SP GR SPEC: 1.02 — SIGNIFICANT CHANGE UP (ref 1.01–1.02)
UROBILINOGEN FLD QL: NEGATIVE MG/DL — SIGNIFICANT CHANGE UP
WBC UR QL: ABNORMAL

## 2019-08-10 PROCEDURE — 81001 URINALYSIS AUTO W/SCOPE: CPT

## 2019-08-10 PROCEDURE — 87186 SC STD MICRODIL/AGAR DIL: CPT

## 2019-08-10 PROCEDURE — 99283 EMERGENCY DEPT VISIT LOW MDM: CPT

## 2019-08-10 PROCEDURE — 87086 URINE CULTURE/COLONY COUNT: CPT

## 2019-08-10 RX ORDER — CEPHALEXIN 500 MG
500 CAPSULE ORAL ONCE
Refills: 0 | Status: COMPLETED | OUTPATIENT
Start: 2019-08-10 | End: 2019-08-10

## 2019-08-10 RX ORDER — CEPHALEXIN 500 MG
1 CAPSULE ORAL
Qty: 40 | Refills: 0
Start: 2019-08-10 | End: 2019-08-19

## 2019-08-10 RX ADMIN — Medication 500 MILLIGRAM(S): at 15:54

## 2019-08-10 NOTE — ED STATDOCS - PMH
CAD (coronary artery disease)    DM2 (diabetes mellitus, type 2)    History of TIAs  Recently diagnosed this July 2019 while in ED at [facility]  HTN (hypertension)    Macular degeneration    Risk factors for obstructive sleep apnea

## 2019-08-10 NOTE — ED ADULT TRIAGE NOTE - CHIEF COMPLAINT QUOTE
dysuria, polyuria, urinary urgency. reports has had symptoms since alexandra was removed. reports alexandra was removed 1 week ago before discharge s/p aortic valve repair. sent in from urgent care for "abnormal urine results".

## 2019-08-10 NOTE — ED STATDOCS - OBJECTIVE STATEMENT
84y/o M with PMHx of TIA, CAD, DM, HTN presents to the office c/o dysuria and urinary frequency. Additionally, patient c/o rear pain, exacerbated by sitting. Pt presented to the ED 1 week ago for Aortic valve replacement, catheter taken out day after. Pt Follow up at [facility], UA dip showed + infection and was referred to Follow up at ED. Denies fever, chills or back pain. No additional complaints at this time.

## 2019-08-10 NOTE — ED CLERICAL - NS ED CLERK NOTE PRE-ARRIVAL INFORMATION; ADDITIONAL PRE-ARRIVAL INFORMATION
This patient is enrolled in the Follow Your Heart program and has undergone a cardiac surgery procedure and has active care navigation. This patient can be followed up by the care navigation team within 24 hours. To arrange close follow-up or to obtain additional clinical information about this patient, please call the contact number above. Please call the cardiac surgery team once patient is registered at [phone] for consultation PRIOR to disposition decision.  The patient recently underwent a cardiac surgery procedure and the team can assist in acute medical management.

## 2019-08-10 NOTE — ED STATDOCS - PSH
History of partial knee replacement  Bilateral  Kidney stones    S/P CABG (coronary artery bypass graft)  CABG x  S/P cholecystectomy

## 2019-08-10 NOTE — ED STATDOCS - CLINICAL SUMMARY MEDICAL DECISION MAKING FREE TEXT BOX
pt s/p cardiac surgery. doing well but having dysuria. Went to UC and dx with uti but MD made him come here for eval. No fever, back pain, abd pain, racing heartbeat, vomiting.  PE as documented  plan ua /c +s, start keflex and dc

## 2019-08-15 RX ORDER — CEFPODOXIME PROXETIL 100 MG
1 TABLET ORAL
Qty: 20 | Refills: 0
Start: 2019-08-15 | End: 2019-08-24

## 2019-08-15 NOTE — ED POST DISCHARGE NOTE - RESULT SUMMARY
+ ESBL, pt on keflex - spoke with patient - reports improvement in symptoms - denies any symptoms at all - will defer change in antibx-  advised to f/u with urologist

## 2019-08-19 NOTE — ASSESSMENT
[FreeTextEntry1] : 83-year-old gentleman status post a transcatheter aortic valve replacement. Patient currently a home doing very well. On exam all his medicines are stable. His lungs are clear. And all his incisions are healing very nicely. He will continue to followup with you for his cardiovascular care. Thank you for the opportunity to participate in the care of your patient.

## 2019-08-19 NOTE — REASON FOR VISIT
[Spouse] : spouse [de-identified] : TAVR [de-identified] : 08/02/19 [de-identified] : Patient reports feeling better, reports having a difficult time urinating (burning) and difficult to initiate stream.

## 2019-08-19 NOTE — CONSULT LETTER
[Dear  ___] : Dear  [unfilled], [Courtesy Letter:] : I had the pleasure of seeing your patient, [unfilled], in my office today. [Consult Closing:] : Thank you very much for allowing me to participate in the care of this patient.  If you have any questions, please do not hesitate to contact me. [Sincerely,] : Sincerely, [FreeTextEntry3] : Wilber Cabrera MD\par Chief, Cardiovascular Surgery at [facility]\par System Director of Surgical Heart Failure\par Professor, Cardiothoracic Surgery, [employer]\par  [FreeTextEntry2] : Kun Partida MD

## 2019-09-13 ENCOUNTER — ESTABLISHED PATIENT (OUTPATIENT)
Dept: URBAN - METROPOLITAN AREA CLINIC 43 | Facility: CLINIC | Age: 84
End: 2019-09-13

## 2019-09-13 VITALS — SYSTOLIC BLOOD PRESSURE: 111 MMHG | HEART RATE: 78 BPM | DIASTOLIC BLOOD PRESSURE: 41 MMHG | HEIGHT: 60 IN

## 2019-09-13 DIAGNOSIS — H35.3211: ICD-10-CM

## 2019-09-13 DIAGNOSIS — H59.022: ICD-10-CM

## 2019-09-13 DIAGNOSIS — H35.363: ICD-10-CM

## 2019-09-13 DIAGNOSIS — H43.813: ICD-10-CM

## 2019-09-13 DIAGNOSIS — H34.8320: ICD-10-CM

## 2019-09-13 DIAGNOSIS — H35.352: ICD-10-CM

## 2019-09-13 DIAGNOSIS — E11.9: ICD-10-CM

## 2019-09-13 DIAGNOSIS — H35.3122: ICD-10-CM

## 2019-09-13 PROCEDURE — 9222650 BILAT EXTENDED OPHTHALMOSCOPY, F/U

## 2019-09-13 PROCEDURE — 92014 COMPRE OPH EXAM EST PT 1/>: CPT

## 2019-09-13 PROCEDURE — 92134 CPTRZ OPH DX IMG PST SGM RTA: CPT

## 2019-09-13 ASSESSMENT — TONOMETRY
OS_IOP_MMHG: 09
OD_IOP_MMHG: 09

## 2019-09-13 ASSESSMENT — VISUAL ACUITY
OS_CC: 20/40+1
OD_CC: 20/40+1

## 2019-11-15 ENCOUNTER — ESTABLISHED PATIENT (OUTPATIENT)
Dept: URBAN - METROPOLITAN AREA CLINIC 43 | Facility: CLINIC | Age: 84
End: 2019-11-15

## 2019-11-15 DIAGNOSIS — E11.9: ICD-10-CM

## 2019-11-15 DIAGNOSIS — H35.3211: ICD-10-CM

## 2019-11-15 DIAGNOSIS — H43.813: ICD-10-CM

## 2019-11-15 DIAGNOSIS — H35.363: ICD-10-CM

## 2019-11-15 DIAGNOSIS — H34.8320: ICD-10-CM

## 2019-11-15 DIAGNOSIS — H35.3122: ICD-10-CM

## 2019-11-15 DIAGNOSIS — H35.352: ICD-10-CM

## 2019-11-15 PROCEDURE — 9222650 BILAT EXTENDED OPHTHALMOSCOPY, F/U

## 2019-11-15 PROCEDURE — 92134 CPTRZ OPH DX IMG PST SGM RTA: CPT

## 2019-11-15 PROCEDURE — 92014 COMPRE OPH EXAM EST PT 1/>: CPT

## 2019-11-15 ASSESSMENT — TONOMETRY
OS_IOP_MMHG: 11
OD_IOP_MMHG: 12

## 2019-11-15 ASSESSMENT — VISUAL ACUITY
OD_SC: 20/40-2
OS_SC: 20/30-2

## 2020-01-10 ENCOUNTER — APPOINTMENT (RX ONLY)
Dept: URBAN - METROPOLITAN AREA CLINIC 151 | Facility: CLINIC | Age: 85
Setting detail: DERMATOLOGY
End: 2020-01-10

## 2020-01-10 DIAGNOSIS — D18.0 HEMANGIOMA: ICD-10-CM

## 2020-01-10 DIAGNOSIS — L57.0 ACTINIC KERATOSIS: ICD-10-CM

## 2020-01-10 DIAGNOSIS — L81.4 OTHER MELANIN HYPERPIGMENTATION: ICD-10-CM

## 2020-01-10 DIAGNOSIS — L82.1 OTHER SEBORRHEIC KERATOSIS: ICD-10-CM

## 2020-01-10 DIAGNOSIS — D22 MELANOCYTIC NEVI: ICD-10-CM

## 2020-01-10 PROBLEM — D22.62 MELANOCYTIC NEVI OF LEFT UPPER LIMB, INCLUDING SHOULDER: Status: ACTIVE | Noted: 2020-01-10

## 2020-01-10 PROBLEM — D18.01 HEMANGIOMA OF SKIN AND SUBCUTANEOUS TISSUE: Status: ACTIVE | Noted: 2020-01-10

## 2020-01-10 PROBLEM — D48.5 NEOPLASM OF UNCERTAIN BEHAVIOR OF SKIN: Status: ACTIVE | Noted: 2020-01-10

## 2020-01-10 PROCEDURE — 17003 DESTRUCT PREMALG LES 2-14: CPT

## 2020-01-10 PROCEDURE — 11102 TANGNTL BX SKIN SINGLE LES: CPT

## 2020-01-10 PROCEDURE — 99203 OFFICE O/P NEW LOW 30 MIN: CPT | Mod: 25

## 2020-01-10 PROCEDURE — ? BIOPSY BY SHAVE METHOD

## 2020-01-10 PROCEDURE — ? LIQUID NITROGEN

## 2020-01-10 PROCEDURE — 17000 DESTRUCT PREMALG LESION: CPT | Mod: 59

## 2020-01-10 PROCEDURE — ? COUNSELING

## 2020-01-10 ASSESSMENT — LOCATION ZONE DERM
LOCATION ZONE: ARM
LOCATION ZONE: HAND
LOCATION ZONE: SCALP
LOCATION ZONE: TRUNK
LOCATION ZONE: EAR

## 2020-01-10 ASSESSMENT — LOCATION SIMPLE DESCRIPTION DERM
LOCATION SIMPLE: LEFT SHOULDER
LOCATION SIMPLE: LEFT FOREARM
LOCATION SIMPLE: RIGHT EAR
LOCATION SIMPLE: RIGHT HAND
LOCATION SIMPLE: UPPER BACK
LOCATION SIMPLE: LEFT HAND
LOCATION SIMPLE: RIGHT UPPER BACK
LOCATION SIMPLE: CHEST
LOCATION SIMPLE: ABDOMEN
LOCATION SIMPLE: LEFT SCALP
LOCATION SIMPLE: SCALP

## 2020-01-10 ASSESSMENT — LOCATION DETAILED DESCRIPTION DERM
LOCATION DETAILED: LEFT SUPERIOR PARIETAL SCALP
LOCATION DETAILED: LEFT PROXIMAL DORSAL FOREARM
LOCATION DETAILED: LEFT RADIAL DORSAL HAND
LOCATION DETAILED: EPIGASTRIC SKIN
LOCATION DETAILED: RIGHT SUPERIOR PARIETAL SCALP
LOCATION DETAILED: LEFT POSTERIOR SHOULDER
LOCATION DETAILED: RIGHT RADIAL DORSAL HAND
LOCATION DETAILED: SUPERIOR THORACIC SPINE
LOCATION DETAILED: LEFT LATERAL SUPERIOR CHEST
LOCATION DETAILED: LEFT MEDIAL FRONTAL SCALP
LOCATION DETAILED: RIGHT SUPERIOR HELIX
LOCATION DETAILED: RIGHT MEDIAL UPPER BACK

## 2020-01-10 NOTE — PROCEDURE: BIOPSY BY SHAVE METHOD
Curettage Text: The wound bed was treated with curettage after the biopsy was performed.
Lab: 6
Detail Level: Detailed
Post-Care Instructions: I reviewed with the patient in detail post-care instructions. Patient is to keep the biopsy site dry overnight, and then apply bacitracin twice daily until healed. Patient may apply hydrogen peroxide soaks to remove any crusting.
Hide Anesthesia Volume?: No
Anesthesia Type: 1% lidocaine with epinephrine
Cryotherapy Text: The wound bed was treated with cryotherapy after the biopsy was performed.
Lab Facility: [id number]
Size Of Lesion In Cm: 0
Notification Instructions: Patient will be notified of biopsy results. However, patient instructed to call the office if not contacted within 2 weeks.
Wound Care: Petrolatum
Electrodesiccation Text: The wound bed was treated with electrodesiccation after the biopsy was performed.
Depth Of Biopsy: dermis
Consent: Written consent was obtained and risks were reviewed including but not limited to scarring, infection, bleeding, scabbing, incomplete removal, nerve damage and allergy to anesthesia.
Biopsy Type: H and E
Type Of Destruction Used: Curettage
Electrodesiccation And Curettage Text: The wound bed was treated with electrodesiccation and curettage after the biopsy was performed.
Anesthesia Volume In Cc (Will Not Render If 0): 0.5
Dressing: bandage
Silver Nitrate Text: The wound bed was treated with silver nitrate after the biopsy was performed.
Was A Bandage Applied: Yes
Billing Type: Third-Party Bill
Biopsy Method: Dermablade
Hemostasis: Drysol

## 2020-03-05 ENCOUNTER — APPOINTMENT (RX ONLY)
Dept: URBAN - METROPOLITAN AREA CLINIC 151 | Facility: CLINIC | Age: 85
Setting detail: DERMATOLOGY
End: 2020-03-05

## 2020-03-05 PROBLEM — D04.39 CARCINOMA IN SITU OF SKIN OF OTHER PARTS OF FACE: Status: ACTIVE | Noted: 2020-03-05

## 2020-03-05 PROCEDURE — 17311 MOHS 1 STAGE H/N/HF/G: CPT

## 2020-03-05 PROCEDURE — 12052 INTMD RPR FACE/MM 2.6-5.0 CM: CPT

## 2020-03-05 PROCEDURE — ? MOHS SURGERY

## 2020-03-05 PROCEDURE — ? COUNSELING

## 2020-03-16 ENCOUNTER — APPOINTMENT (RX ONLY)
Dept: URBAN - METROPOLITAN AREA CLINIC 151 | Facility: CLINIC | Age: 85
Setting detail: DERMATOLOGY
End: 2020-03-16

## 2020-03-16 DIAGNOSIS — Z48.02 ENCOUNTER FOR REMOVAL OF SUTURES: ICD-10-CM

## 2020-03-16 PROCEDURE — ? OTHER

## 2020-03-16 PROCEDURE — ? SUTURE REMOVAL (GLOBAL PERIOD)

## 2020-03-16 PROCEDURE — 99024 POSTOP FOLLOW-UP VISIT: CPT

## 2020-03-16 ASSESSMENT — LOCATION DETAILED DESCRIPTION DERM: LOCATION DETAILED: LEFT SUPERIOR FOREHEAD

## 2020-03-16 ASSESSMENT — LOCATION SIMPLE DESCRIPTION DERM: LOCATION SIMPLE: LEFT FOREHEAD

## 2020-03-16 ASSESSMENT — LOCATION ZONE DERM: LOCATION ZONE: FACE

## 2020-03-16 NOTE — PROCEDURE: OTHER
Other (Free Text): Dr. Ambriz never entered the room.
Detail Level: Zone
Note Text (......Xxx Chief Complaint.): This diagnosis correlates with the

## 2020-03-16 NOTE — PROCEDURE: SUTURE REMOVAL (GLOBAL PERIOD)
Detail Level: Detailed
Add 92125 Cpt? (Important Note: In 2017 The Use Of 11292 Is Being Tracked By Cms To Determine Future Global Period Reimbursement For Global Periods): yes

## 2020-05-08 ENCOUNTER — ESTABLISHED COMPREHENSIVE EXAM (OUTPATIENT)
Dept: URBAN - METROPOLITAN AREA CLINIC 43 | Facility: CLINIC | Age: 85
End: 2020-05-08

## 2020-05-08 DIAGNOSIS — H34.8320: ICD-10-CM

## 2020-05-08 DIAGNOSIS — E11.9: ICD-10-CM

## 2020-05-08 DIAGNOSIS — H43.813: ICD-10-CM

## 2020-05-08 DIAGNOSIS — H35.3211: ICD-10-CM

## 2020-05-08 DIAGNOSIS — H35.363: ICD-10-CM

## 2020-05-08 DIAGNOSIS — H35.3122: ICD-10-CM

## 2020-05-08 DIAGNOSIS — H40.1132: ICD-10-CM

## 2020-05-08 DIAGNOSIS — H35.352: ICD-10-CM

## 2020-05-08 PROCEDURE — 92014 COMPRE OPH EXAM EST PT 1/>: CPT

## 2020-05-08 PROCEDURE — 92201 OPSCPY EXTND RTA DRAW UNI/BI: CPT

## 2020-05-08 PROCEDURE — 92134 CPTRZ OPH DX IMG PST SGM RTA: CPT

## 2020-05-08 ASSESSMENT — VISUAL ACUITY
OD_SC: 20/50+1
OS_SC: 20/30-2

## 2020-05-08 ASSESSMENT — TONOMETRY
OS_IOP_MMHG: 12
OD_IOP_MMHG: 16

## 2020-07-16 ENCOUNTER — ESTABLISHED COMPREHENSIVE EXAM (OUTPATIENT)
Dept: URBAN - METROPOLITAN AREA CLINIC 43 | Facility: CLINIC | Age: 85
End: 2020-07-16

## 2020-07-16 DIAGNOSIS — H40.1132: ICD-10-CM

## 2020-07-16 DIAGNOSIS — H43.813: ICD-10-CM

## 2020-07-16 DIAGNOSIS — H35.3122: ICD-10-CM

## 2020-07-16 DIAGNOSIS — E11.9: ICD-10-CM

## 2020-07-16 DIAGNOSIS — H35.3211: ICD-10-CM

## 2020-07-16 DIAGNOSIS — H52.4: ICD-10-CM

## 2020-07-16 PROCEDURE — 92014 COMPRE OPH EXAM EST PT 1/>: CPT

## 2020-07-16 PROCEDURE — 92015 DETERMINE REFRACTIVE STATE: CPT

## 2020-07-16 ASSESSMENT — VISUAL ACUITY
OS_CC: J2
OS_SC: 20/40-2
OD_SC: 20/50-1
OD_PH: 20/50+2
OD_CC: J4

## 2020-07-16 ASSESSMENT — TONOMETRY
OD_IOP_MMHG: 13
OS_IOP_MMHG: 13

## 2020-08-17 ENCOUNTER — APPOINTMENT (RX ONLY)
Dept: URBAN - METROPOLITAN AREA CLINIC 151 | Facility: CLINIC | Age: 85
Setting detail: DERMATOLOGY
End: 2020-08-17

## 2020-08-17 DIAGNOSIS — L81.4 OTHER MELANIN HYPERPIGMENTATION: ICD-10-CM

## 2020-08-17 DIAGNOSIS — D18.0 HEMANGIOMA: ICD-10-CM

## 2020-08-17 DIAGNOSIS — K13.0 DISEASES OF LIPS: ICD-10-CM

## 2020-08-17 DIAGNOSIS — L28.1 PRURIGO NODULARIS: ICD-10-CM

## 2020-08-17 DIAGNOSIS — Z86.007 PERSONAL HISTORY OF IN-SITU NEOPLASM OF SKIN: ICD-10-CM

## 2020-08-17 DIAGNOSIS — L57.0 ACTINIC KERATOSIS: ICD-10-CM

## 2020-08-17 DIAGNOSIS — Z71.89 OTHER SPECIFIED COUNSELING: ICD-10-CM

## 2020-08-17 DIAGNOSIS — L21.8 OTHER SEBORRHEIC DERMATITIS: ICD-10-CM

## 2020-08-17 DIAGNOSIS — D69.2 OTHER NONTHROMBOCYTOPENIC PURPURA: ICD-10-CM

## 2020-08-17 PROBLEM — Z85.828 PERSONAL HISTORY OF OTHER MALIGNANT NEOPLASM OF SKIN: Status: ACTIVE | Noted: 2020-08-17

## 2020-08-17 PROBLEM — D18.01 HEMANGIOMA OF SKIN AND SUBCUTANEOUS TISSUE: Status: ACTIVE | Noted: 2020-08-17

## 2020-08-17 PROCEDURE — 17000 DESTRUCT PREMALG LESION: CPT

## 2020-08-17 PROCEDURE — ? ADDITIONAL NOTES

## 2020-08-17 PROCEDURE — 99213 OFFICE O/P EST LOW 20 MIN: CPT | Mod: 25

## 2020-08-17 PROCEDURE — ? COUNSELING

## 2020-08-17 PROCEDURE — ? PRESCRIPTION

## 2020-08-17 PROCEDURE — ? OTHER

## 2020-08-17 PROCEDURE — ? TREATMENT REGIMEN

## 2020-08-17 PROCEDURE — 17003 DESTRUCT PREMALG LES 2-14: CPT

## 2020-08-17 PROCEDURE — ? LIQUID NITROGEN

## 2020-08-17 RX ORDER — KETOCONAZOLE 20 MG/ML
SHAMPOO, SUSPENSION TOPICAL
Qty: 1 | Refills: 3 | Status: ERX

## 2020-08-17 RX ORDER — BETAMETHASONE DIPROPIONATE 0.5 MG/ML
LOTION TOPICAL
Qty: 1 | Refills: 1 | Status: ERX | COMMUNITY
Start: 2020-08-17

## 2020-08-17 RX ADMIN — BETAMETHASONE DIPROPIONATE: 0.5 LOTION TOPICAL at 00:00

## 2020-08-17 ASSESSMENT — LOCATION ZONE DERM
LOCATION ZONE: FACE
LOCATION ZONE: TRUNK
LOCATION ZONE: NECK
LOCATION ZONE: LEG
LOCATION ZONE: LIP
LOCATION ZONE: ARM
LOCATION ZONE: SCALP

## 2020-08-17 ASSESSMENT — LOCATION DETAILED DESCRIPTION DERM
LOCATION DETAILED: RIGHT PROXIMAL DORSAL FOREARM
LOCATION DETAILED: LEFT INFERIOR POSTAURICULAR SKIN
LOCATION DETAILED: LEFT MID-UPPER BACK
LOCATION DETAILED: LEFT SUPERIOR PARIETAL SCALP
LOCATION DETAILED: LEFT DISTAL DORSAL FOREARM
LOCATION DETAILED: LEFT CENTRAL FRONTAL SCALP
LOCATION DETAILED: RIGHT CENTRAL PARIETAL SCALP
LOCATION DETAILED: LEFT VENTRAL DISTAL FOREARM
LOCATION DETAILED: LEFT SUPERIOR FOREHEAD
LOCATION DETAILED: RIGHT MEDIAL FRONTAL SCALP
LOCATION DETAILED: RIGHT MEDIAL INFERIOR CHEST
LOCATION DETAILED: RIGHT ANTERIOR DISTAL UPPER ARM
LOCATION DETAILED: EPIGASTRIC SKIN
LOCATION DETAILED: LEFT INFERIOR VERMILION LIP
LOCATION DETAILED: RIGHT SUPERIOR LATERAL NECK
LOCATION DETAILED: RIGHT SUPERIOR POSTAURICULAR SKIN
LOCATION DETAILED: RIGHT VENTRAL PROXIMAL FOREARM
LOCATION DETAILED: RIGHT LOWER CUTANEOUS LIP
LOCATION DETAILED: RIGHT DISTAL DORSAL FOREARM
LOCATION DETAILED: LEFT MEDIAL SUPERIOR CHEST
LOCATION DETAILED: LEFT SUPERIOR LATERAL UPPER BACK
LOCATION DETAILED: LEFT ANTERIOR DISTAL THIGH
LOCATION DETAILED: RIGHT LATERAL ABDOMEN
LOCATION DETAILED: RIGHT CENTRAL FRONTAL SCALP
LOCATION DETAILED: RIGHT INFERIOR MEDIAL UPPER BACK
LOCATION DETAILED: LEFT CENTRAL PARIETAL SCALP
LOCATION DETAILED: RIGHT LATERAL TRAPEZIAL NECK

## 2020-08-17 ASSESSMENT — LOCATION SIMPLE DESCRIPTION DERM
LOCATION SIMPLE: RIGHT SCALP
LOCATION SIMPLE: LEFT THIGH
LOCATION SIMPLE: POSTERIOR NECK
LOCATION SIMPLE: LEFT LIP
LOCATION SIMPLE: ABDOMEN
LOCATION SIMPLE: SCALP
LOCATION SIMPLE: RIGHT FOREARM
LOCATION SIMPLE: LEFT UPPER BACK
LOCATION SIMPLE: RIGHT LIP
LOCATION SIMPLE: LEFT FOREARM
LOCATION SIMPLE: LEFT SCALP
LOCATION SIMPLE: RIGHT UPPER ARM
LOCATION SIMPLE: RIGHT UPPER BACK
LOCATION SIMPLE: LEFT FOREHEAD
LOCATION SIMPLE: CHEST
LOCATION SIMPLE: NECK

## 2020-08-17 NOTE — PROCEDURE: TREATMENT REGIMEN
Detail Level: Zone
Samples Given: Dr. Dan’s cortibalm
Otc Regimen: Dr. Dan’s cortibalm, Vaseline or aquaphor

## 2020-08-17 NOTE — PROCEDURE: OTHER
Other (Free Text): Patient reports that he has a habit of eating salty pumpkin seeds and thought that was the cause. He is aware that this could be sun damage. We will try Dr. Dan's cortibalm for a few weeks and then recheck. Possible treatment for actinic damage at next visit.
Detail Level: Zone
Note Text (......Xxx Chief Complaint.): This diagnosis correlates with the

## 2020-09-04 ENCOUNTER — ESTABLISHED COMPREHENSIVE EXAM (OUTPATIENT)
Dept: URBAN - METROPOLITAN AREA CLINIC 43 | Facility: CLINIC | Age: 85
End: 2020-09-04

## 2020-09-04 DIAGNOSIS — H35.363: ICD-10-CM

## 2020-09-04 DIAGNOSIS — E11.9: ICD-10-CM

## 2020-09-04 DIAGNOSIS — H34.8320: ICD-10-CM

## 2020-09-04 DIAGNOSIS — H43.813: ICD-10-CM

## 2020-09-04 DIAGNOSIS — Z96.1: ICD-10-CM

## 2020-09-04 DIAGNOSIS — H35.3211: ICD-10-CM

## 2020-09-04 DIAGNOSIS — H35.3122: ICD-10-CM

## 2020-09-04 DIAGNOSIS — H35.352: ICD-10-CM

## 2020-09-04 DIAGNOSIS — H40.1132: ICD-10-CM

## 2020-09-04 PROCEDURE — 92012 INTRM OPH EXAM EST PATIENT: CPT

## 2020-09-04 PROCEDURE — 92134 CPTRZ OPH DX IMG PST SGM RTA: CPT

## 2020-09-04 PROCEDURE — 92202 OPSCPY EXTND ON/MAC DRAW: CPT

## 2020-09-04 ASSESSMENT — TONOMETRY
OD_IOP_MMHG: 13
OS_IOP_MMHG: 15

## 2020-09-04 ASSESSMENT — VISUAL ACUITY
OD_SC: 20/40-2
OS_SC: 20/30-1

## 2020-09-14 ENCOUNTER — APPOINTMENT (RX ONLY)
Dept: URBAN - METROPOLITAN AREA CLINIC 151 | Facility: CLINIC | Age: 85
Setting detail: DERMATOLOGY
End: 2020-09-14

## 2020-09-14 DIAGNOSIS — D69.2 OTHER NONTHROMBOCYTOPENIC PURPURA: ICD-10-CM

## 2020-09-14 DIAGNOSIS — L663 OTHER SPECIFIED DISEASES OF HAIR AND HAIR FOLLICLES: ICD-10-CM

## 2020-09-14 DIAGNOSIS — L73.9 FOLLICULAR DISORDER, UNSPECIFIED: ICD-10-CM

## 2020-09-14 DIAGNOSIS — L738 OTHER SPECIFIED DISEASES OF HAIR AND HAIR FOLLICLES: ICD-10-CM

## 2020-09-14 DIAGNOSIS — K13.0 DISEASES OF LIPS: ICD-10-CM | Status: IMPROVED

## 2020-09-14 DIAGNOSIS — L21.8 OTHER SEBORRHEIC DERMATITIS: ICD-10-CM | Status: UNCHANGED

## 2020-09-14 PROBLEM — L02.821 FURUNCLE OF HEAD [ANY PART, EXCEPT FACE]: Status: ACTIVE | Noted: 2020-09-14

## 2020-09-14 PROCEDURE — ? ADDITIONAL NOTES

## 2020-09-14 PROCEDURE — 99213 OFFICE O/P EST LOW 20 MIN: CPT

## 2020-09-14 PROCEDURE — ? PRESCRIPTION

## 2020-09-14 PROCEDURE — ? COUNSELING

## 2020-09-14 PROCEDURE — ? TREATMENT REGIMEN

## 2020-09-14 RX ORDER — BENZOYL PEROXIDE 3 %
CREAM (GRAM) TOPICAL
Qty: 1 | Refills: 1 | Status: ERX | COMMUNITY
Start: 2020-09-14

## 2020-09-14 RX ADMIN — Medication: at 00:00

## 2020-09-14 ASSESSMENT — LOCATION DETAILED DESCRIPTION DERM
LOCATION DETAILED: SUPERIOR MID FOREHEAD
LOCATION DETAILED: LEFT SUPERIOR PARIETAL SCALP
LOCATION DETAILED: RIGHT SUPERIOR VERMILION LIP
LOCATION DETAILED: RIGHT SUPERIOR PARIETAL SCALP
LOCATION DETAILED: LEFT DISTAL RADIAL DORSAL FOREARM
LOCATION DETAILED: RIGHT DISTAL DORSAL FOREARM
LOCATION DETAILED: RIGHT INFERIOR VERMILION LIP

## 2020-09-14 ASSESSMENT — LOCATION SIMPLE DESCRIPTION DERM
LOCATION SIMPLE: RIGHT LIP
LOCATION SIMPLE: RIGHT FOREARM
LOCATION SIMPLE: SCALP
LOCATION SIMPLE: SUPERIOR FOREHEAD
LOCATION SIMPLE: LEFT FOREARM

## 2020-09-14 ASSESSMENT — LOCATION ZONE DERM
LOCATION ZONE: SCALP
LOCATION ZONE: LIP
LOCATION ZONE: FACE
LOCATION ZONE: ARM

## 2020-09-14 NOTE — PROCEDURE: TREATMENT REGIMEN
Initiate Treatment: Benzoyl peroxide 2.5% cream
Continue Regimen: Betamethasone lotion , ketaconazole shampoo.
Samples Given: Cerave or vanicream moisturizer
Detail Level: Zone

## 2020-10-05 ENCOUNTER — APPOINTMENT (RX ONLY)
Dept: URBAN - METROPOLITAN AREA CLINIC 151 | Facility: CLINIC | Age: 85
Setting detail: DERMATOLOGY
End: 2020-10-05

## 2020-10-05 DIAGNOSIS — L738 OTHER SPECIFIED DISEASES OF HAIR AND HAIR FOLLICLES: ICD-10-CM | Status: IMPROVED

## 2020-10-05 DIAGNOSIS — L21.8 OTHER SEBORRHEIC DERMATITIS: ICD-10-CM | Status: IMPROVED

## 2020-10-05 DIAGNOSIS — L663 OTHER SPECIFIED DISEASES OF HAIR AND HAIR FOLLICLES: ICD-10-CM | Status: IMPROVED

## 2020-10-05 DIAGNOSIS — L73.9 FOLLICULAR DISORDER, UNSPECIFIED: ICD-10-CM | Status: IMPROVED

## 2020-10-05 PROBLEM — L02.821 FURUNCLE OF HEAD [ANY PART, EXCEPT FACE]: Status: ACTIVE | Noted: 2020-10-05

## 2020-10-05 PROCEDURE — ? COUNSELING

## 2020-10-05 PROCEDURE — 99213 OFFICE O/P EST LOW 20 MIN: CPT

## 2020-10-05 PROCEDURE — ? ADDITIONAL NOTES

## 2020-10-05 PROCEDURE — ? TREATMENT REGIMEN

## 2020-10-05 ASSESSMENT — LOCATION DETAILED DESCRIPTION DERM
LOCATION DETAILED: SUPERIOR MID FOREHEAD
LOCATION DETAILED: RIGHT MEDIAL FRONTAL SCALP

## 2020-10-05 ASSESSMENT — LOCATION ZONE DERM
LOCATION ZONE: SCALP
LOCATION ZONE: FACE

## 2020-10-05 ASSESSMENT — LOCATION SIMPLE DESCRIPTION DERM
LOCATION SIMPLE: RIGHT SCALP
LOCATION SIMPLE: SUPERIOR FOREHEAD

## 2020-10-05 NOTE — PROCEDURE: TREATMENT REGIMEN
Continue Regimen: Ketoconazole shampoo, betamethasone lotion
Samples Given: Vanicream lotion and cream
Detail Level: Zone
Initiate Treatment: Benzoyl peroxide 4% cream

## 2021-01-08 ENCOUNTER — ESTABLISHED PATIENT (OUTPATIENT)
Dept: URBAN - METROPOLITAN AREA CLINIC 43 | Facility: CLINIC | Age: 86
End: 2021-01-08

## 2021-01-08 VITALS — SYSTOLIC BLOOD PRESSURE: 145 MMHG | HEART RATE: 55 BPM | DIASTOLIC BLOOD PRESSURE: 60 MMHG | HEIGHT: 60 IN

## 2021-01-08 DIAGNOSIS — H35.352: ICD-10-CM

## 2021-01-08 DIAGNOSIS — H43.813: ICD-10-CM

## 2021-01-08 DIAGNOSIS — H35.3122: ICD-10-CM

## 2021-01-08 DIAGNOSIS — H35.3211: ICD-10-CM

## 2021-01-08 DIAGNOSIS — H35.363: ICD-10-CM

## 2021-01-08 DIAGNOSIS — H34.8320: ICD-10-CM

## 2021-01-08 DIAGNOSIS — H59.022: ICD-10-CM

## 2021-01-08 DIAGNOSIS — E11.9: ICD-10-CM

## 2021-01-08 PROCEDURE — 92014 COMPRE OPH EXAM EST PT 1/>: CPT

## 2021-01-08 PROCEDURE — 92202 OPSCPY EXTND ON/MAC DRAW: CPT

## 2021-01-08 PROCEDURE — 92134 CPTRZ OPH DX IMG PST SGM RTA: CPT

## 2021-01-08 ASSESSMENT — TONOMETRY
OS_IOP_MMHG: 14
OD_IOP_MMHG: 14

## 2021-01-08 ASSESSMENT — VISUAL ACUITY
OD_SC: 20/50+2
OS_SC: 20/40-1

## 2021-01-18 ENCOUNTER — IOP CHECK (OUTPATIENT)
Dept: URBAN - METROPOLITAN AREA CLINIC 43 | Facility: CLINIC | Age: 86
End: 2021-01-18

## 2021-01-18 DIAGNOSIS — H40.1132: ICD-10-CM

## 2021-01-18 PROCEDURE — 92012 INTRM OPH EXAM EST PATIENT: CPT

## 2021-01-18 PROCEDURE — 92083 EXTENDED VISUAL FIELD XM: CPT

## 2021-01-18 ASSESSMENT — VISUAL ACUITY
OD_SC: 20/50+2
OS_SC: 20/40-2

## 2021-01-18 ASSESSMENT — TONOMETRY
OS_IOP_MMHG: 12
OD_IOP_MMHG: 11

## 2021-04-05 ENCOUNTER — ESTABLISHED COMPREHENSIVE EXAM (OUTPATIENT)
Dept: URBAN - METROPOLITAN AREA CLINIC 43 | Facility: CLINIC | Age: 86
End: 2021-04-05

## 2021-04-05 DIAGNOSIS — H43.813: ICD-10-CM

## 2021-04-05 DIAGNOSIS — H52.4: ICD-10-CM

## 2021-04-05 DIAGNOSIS — H35.363: ICD-10-CM

## 2021-04-05 DIAGNOSIS — E11.9: ICD-10-CM

## 2021-04-05 DIAGNOSIS — H40.1132: ICD-10-CM

## 2021-04-05 DIAGNOSIS — H35.352: ICD-10-CM

## 2021-04-05 DIAGNOSIS — H35.3122: ICD-10-CM

## 2021-04-05 PROCEDURE — 92014 COMPRE OPH EXAM EST PT 1/>: CPT

## 2021-04-05 PROCEDURE — 92015 DETERMINE REFRACTIVE STATE: CPT

## 2021-04-05 ASSESSMENT — VISUAL ACUITY
OD_SC: J12
OS_CC: J6-
OD_SC: 20/40
OS_SC: J12
OS_SC: 20/50+2
OD_CC: J4-

## 2021-04-05 ASSESSMENT — TONOMETRY
OS_IOP_MMHG: 10
OD_IOP_MMHG: 12

## 2021-04-14 ENCOUNTER — APPOINTMENT (RX ONLY)
Dept: URBAN - METROPOLITAN AREA CLINIC 151 | Facility: CLINIC | Age: 86
Setting detail: DERMATOLOGY
End: 2021-04-14

## 2021-04-14 DIAGNOSIS — L82.1 OTHER SEBORRHEIC KERATOSIS: ICD-10-CM

## 2021-04-14 DIAGNOSIS — L73.9 FOLLICULAR DISORDER, UNSPECIFIED: ICD-10-CM | Status: INADEQUATELY CONTROLLED

## 2021-04-14 DIAGNOSIS — L738 OTHER SPECIFIED DISEASES OF HAIR AND HAIR FOLLICLES: ICD-10-CM | Status: INADEQUATELY CONTROLLED

## 2021-04-14 DIAGNOSIS — D18.0 HEMANGIOMA: ICD-10-CM

## 2021-04-14 DIAGNOSIS — Z71.89 OTHER SPECIFIED COUNSELING: ICD-10-CM

## 2021-04-14 DIAGNOSIS — L57.0 ACTINIC KERATOSIS: ICD-10-CM

## 2021-04-14 DIAGNOSIS — L663 OTHER SPECIFIED DISEASES OF HAIR AND HAIR FOLLICLES: ICD-10-CM | Status: INADEQUATELY CONTROLLED

## 2021-04-14 DIAGNOSIS — Z86.007 PERSONAL HISTORY OF IN-SITU NEOPLASM OF SKIN: ICD-10-CM

## 2021-04-14 DIAGNOSIS — L81.4 OTHER MELANIN HYPERPIGMENTATION: ICD-10-CM

## 2021-04-14 DIAGNOSIS — L21.8 OTHER SEBORRHEIC DERMATITIS: ICD-10-CM | Status: INADEQUATELY CONTROLLED

## 2021-04-14 PROBLEM — Z85.828 PERSONAL HISTORY OF OTHER MALIGNANT NEOPLASM OF SKIN: Status: ACTIVE | Noted: 2021-04-14

## 2021-04-14 PROBLEM — L02.821 FURUNCLE OF HEAD [ANY PART, EXCEPT FACE]: Status: ACTIVE | Noted: 2021-04-14

## 2021-04-14 PROBLEM — D18.01 HEMANGIOMA OF SKIN AND SUBCUTANEOUS TISSUE: Status: ACTIVE | Noted: 2021-04-14

## 2021-04-14 PROCEDURE — 17000 DESTRUCT PREMALG LESION: CPT

## 2021-04-14 PROCEDURE — ? PRESCRIPTION

## 2021-04-14 PROCEDURE — 17003 DESTRUCT PREMALG LES 2-14: CPT

## 2021-04-14 PROCEDURE — 99214 OFFICE O/P EST MOD 30 MIN: CPT | Mod: 25

## 2021-04-14 PROCEDURE — ? OTHER

## 2021-04-14 PROCEDURE — ? ADDITIONAL NOTES

## 2021-04-14 PROCEDURE — ? TREATMENT REGIMEN

## 2021-04-14 PROCEDURE — ? COUNSELING

## 2021-04-14 PROCEDURE — ? LIQUID NITROGEN

## 2021-04-14 RX ORDER — KETOCONAZOLE 20 MG/ML
SHAMPOO, SUSPENSION TOPICAL
Qty: 1 | Refills: 5 | Status: ERX

## 2021-04-14 ASSESSMENT — LOCATION SIMPLE DESCRIPTION DERM
LOCATION SIMPLE: LEFT CHEEK
LOCATION SIMPLE: SUPERIOR FOREHEAD
LOCATION SIMPLE: CHEST
LOCATION SIMPLE: ABDOMEN
LOCATION SIMPLE: ANTERIOR SCALP
LOCATION SIMPLE: SCALP
LOCATION SIMPLE: LEFT SCALP
LOCATION SIMPLE: LEFT FOREHEAD
LOCATION SIMPLE: RIGHT SCALP
LOCATION SIMPLE: LEFT UPPER BACK

## 2021-04-14 ASSESSMENT — LOCATION DETAILED DESCRIPTION DERM
LOCATION DETAILED: EPIGASTRIC SKIN
LOCATION DETAILED: LEFT CENTRAL FRONTAL SCALP
LOCATION DETAILED: RIGHT MEDIAL FRONTAL SCALP
LOCATION DETAILED: LEFT LATERAL SUPERIOR CHEST
LOCATION DETAILED: LEFT SUPERIOR FOREHEAD
LOCATION DETAILED: LEFT INFERIOR UPPER BACK
LOCATION DETAILED: LEFT SUPERIOR UPPER BACK
LOCATION DETAILED: MID-FRONTAL SCALP
LOCATION DETAILED: RIGHT SUPERIOR PARIETAL SCALP
LOCATION DETAILED: SUPERIOR MID FOREHEAD
LOCATION DETAILED: STERNUM
LOCATION DETAILED: LEFT SUPERIOR PARIETAL SCALP
LOCATION DETAILED: LEFT CENTRAL MALAR CHEEK
LOCATION DETAILED: LEFT MEDIAL FRONTAL SCALP

## 2021-04-14 ASSESSMENT — LOCATION ZONE DERM
LOCATION ZONE: TRUNK
LOCATION ZONE: SCALP
LOCATION ZONE: FACE

## 2021-04-14 NOTE — PROCEDURE: TREATMENT REGIMEN
Initiate Treatment: Benzoyl peroxide 4% cream
Detail Level: Zone
Continue Regimen: Ketoconazole shampoo, betamethasone lotion
Samples Given: Vanicream lotion and cream

## 2021-04-14 NOTE — HPI: SKIN LESION
How Severe Is Your Skin Lesion?: moderate
Has Your Skin Lesion Been Treated?: been treated
Is This A New Presentation, Or A Follow-Up?: Skin Lesions
When Was It Treated?: 08/20/2021

## 2021-04-14 NOTE — PROCEDURE: OTHER
Note Text (......Xxx Chief Complaint.): This diagnosis correlates with the
Detail Level: Zone
Other (Free Text): Recommend biopsy of lesion of left parietal scalp. Patient wants to watch for now, treat with LN2 and RTC for recheck.
Render Risk Assessment In Note?: no

## 2021-05-25 NOTE — STROKE CODE NOTE - ACTIVATED STROKE TEAM
Noted. Orders placed.   
Patient has a lab appointment on 5/28/21 .   Please enter lab orders.  If labs are no longer needed, please cancel appointment and call patient to follow up.    
Yes

## 2021-06-01 NOTE — ED ADULT TRIAGE NOTE - BSA (M2)
Medication Question or Clarification  Who is calling: Patient  What medication are you calling about? (include dose and sig)    Disp Refills Start End    oxyCODONE (ROXICODONE) 30 MG immediate release tablet 90 tablet 0 10/3/2019     Sig - Route: Take 1 tablet (30 mg total) by mouth 3 (three) times a day. - Oral    Sent to pharmacy as: oxyCODONE 30 mg tablet (ROXICODONE)    Earliest Fill Date: 10/3/2019    Notes to Pharmacy: Do not fill until 10/3/19    E-Prescribing Status: Receipt confirmed by pharmacy (9/30/2019  4:43 PM CDT)      Who prescribed the medication?: Geoff Crabtree MD   What is your question/concern?: Patient stated he can't pick his Rx up until 10/5 according to the pharmacy even though the Rx states okay to  10/3/19 from Geoff Crabtree MD. Patient stated the pharmacy will release the oxycodone if Geoff Crabtree MD calls and tell them that's okay. Please call [facility] to verify this.  Pharmacy: [facility] [id number]  Okay to leave a detailed message?: Yes  [phone]  Site CMT - Please call the pharmacy to obtain any additional needed information.   1.94

## 2021-06-07 ENCOUNTER — APPOINTMENT (RX ONLY)
Dept: URBAN - METROPOLITAN AREA CLINIC 151 | Facility: CLINIC | Age: 86
Setting detail: DERMATOLOGY
End: 2021-06-07

## 2021-06-07 DIAGNOSIS — Z86.007 PERSONAL HISTORY OF IN-SITU NEOPLASM OF SKIN: ICD-10-CM

## 2021-06-07 DIAGNOSIS — D69.2 OTHER NONTHROMBOCYTOPENIC PURPURA: ICD-10-CM

## 2021-06-07 DIAGNOSIS — Z71.89 OTHER SPECIFIED COUNSELING: ICD-10-CM

## 2021-06-07 DIAGNOSIS — L57.0 ACTINIC KERATOSIS: ICD-10-CM

## 2021-06-07 PROBLEM — Z85.828 PERSONAL HISTORY OF OTHER MALIGNANT NEOPLASM OF SKIN: Status: ACTIVE | Noted: 2021-06-07

## 2021-06-07 PROCEDURE — 99213 OFFICE O/P EST LOW 20 MIN: CPT | Mod: 25

## 2021-06-07 PROCEDURE — 17000 DESTRUCT PREMALG LESION: CPT

## 2021-06-07 PROCEDURE — 17003 DESTRUCT PREMALG LES 2-14: CPT

## 2021-06-07 PROCEDURE — ? ORDER FOR PHOTODYNAMIC THERAPY

## 2021-06-07 PROCEDURE — ? LIQUID NITROGEN

## 2021-06-07 PROCEDURE — ? COUNSELING

## 2021-06-07 PROCEDURE — ? ADDITIONAL NOTES

## 2021-06-07 ASSESSMENT — LOCATION SIMPLE DESCRIPTION DERM
LOCATION SIMPLE: RIGHT FOREARM
LOCATION SIMPLE: POSTERIOR SCALP
LOCATION SIMPLE: LEFT SCALP
LOCATION SIMPLE: LEFT FOREARM
LOCATION SIMPLE: LEFT FOREHEAD
LOCATION SIMPLE: SCALP

## 2021-06-07 ASSESSMENT — LOCATION DETAILED DESCRIPTION DERM
LOCATION DETAILED: LEFT MEDIAL FRONTAL SCALP
LOCATION DETAILED: MID-OCCIPITAL SCALP
LOCATION DETAILED: LEFT DISTAL DORSAL FOREARM
LOCATION DETAILED: RIGHT DISTAL DORSAL FOREARM
LOCATION DETAILED: RIGHT SUPERIOR PARIETAL SCALP
LOCATION DETAILED: LEFT SUPERIOR PARIETAL SCALP
LOCATION DETAILED: LEFT SUPERIOR FOREHEAD

## 2021-06-07 ASSESSMENT — LOCATION ZONE DERM
LOCATION ZONE: SCALP
LOCATION ZONE: FACE
LOCATION ZONE: ARM

## 2021-06-07 NOTE — PROCEDURE: LIQUID NITROGEN
Render Note In Bullet Format When Appropriate: No
Consent: The patient's consent was obtained including but not limited to risks of crusting, scabbing, blistering, scarring, darker or lighter pigmentary change, recurrence, incomplete removal and infection.
Detail Level: Detailed
Post-Care Instructions: I reviewed with the patient in detail post-care instructions. Patient is to wear sunprotection, and avoid picking at any of the treated lesions. Pt may apply Vaseline to crusted or scabbing areas.
Duration Of Freeze Thaw-Cycle (Seconds): 2
Number Of Freeze-Thaw Cycles: 3 freeze-thaw cycles

## 2021-06-07 NOTE — PROCEDURE: COUNSELING
Detail Level: Detailed
Patient Specific Counseling (Will Not Stick From Patient To Patient): PDT with levulonic acid

## 2021-06-07 NOTE — PROCEDURE: ORDER FOR PHOTODYNAMIC THERAPY
Scalp Incubation Time: 1.5 Hours Najma campbell step in
Chest Incubation Time: 2 Hours
Face And Ears Incubation Time: 1 Hour
Face And Scalp Incubation Time: 1 Hour for the face and 2 Hours for the scalp
Frequency Of Pdt: Single Treatment
Consent: The procedure and risks were reviewed with the patient including but not limited to: burning, pigmentary changes, pain, blistering, scabbing, redness, and the possibility of needing numerous treatments. Strict photoprotection after the procedure was also discussed.
Pdt Type: MARICRUZ-U
Occlusion: No
Photosensitizer: Levulan
Detail Level: Simple
Location: Scalp

## 2021-06-21 ENCOUNTER — APPOINTMENT (RX ONLY)
Dept: URBAN - METROPOLITAN AREA CLINIC 151 | Facility: CLINIC | Age: 86
Setting detail: DERMATOLOGY
End: 2021-06-21

## 2021-06-21 DIAGNOSIS — L57.0 ACTINIC KERATOSIS: ICD-10-CM

## 2021-06-21 PROCEDURE — 96574 DBRDMT PRMLG LES W/PDT: CPT

## 2021-06-21 PROCEDURE — ? PDT: BLUE

## 2021-06-21 PROCEDURE — ? COUNSELING

## 2021-06-21 ASSESSMENT — LOCATION SIMPLE DESCRIPTION DERM: LOCATION SIMPLE: POSTERIOR SCALP

## 2021-06-21 ASSESSMENT — LOCATION ZONE DERM: LOCATION ZONE: SCALP

## 2021-06-21 ASSESSMENT — LOCATION DETAILED DESCRIPTION DERM: LOCATION DETAILED: POSTERIOR MID-PARIETAL SCALP

## 2021-06-21 NOTE — PROCEDURE: PDT: BLUE
Who Performed The Pdt?: Performed by MD BIENVENIDO, NAYELI or YOBANY with Pre-Procedure Debridement of Hyperkeratotic Lesions ([id number]) Who Performed The Pdt?: Performed by MD BIENVENIDO, NAYELI or YOBANY with Pre-Procedure Debridement of Hyperkeratotic Lesions ([id number])

## 2021-07-09 ENCOUNTER — ESTABLISHED COMPREHENSIVE EXAM (OUTPATIENT)
Dept: URBAN - METROPOLITAN AREA CLINIC 43 | Facility: CLINIC | Age: 86
End: 2021-07-09

## 2021-07-09 VITALS — HEART RATE: 59 BPM | SYSTOLIC BLOOD PRESSURE: 152 MMHG | DIASTOLIC BLOOD PRESSURE: 83 MMHG | HEIGHT: 60 IN

## 2021-07-09 DIAGNOSIS — H35.352: ICD-10-CM

## 2021-07-09 DIAGNOSIS — H35.3211: ICD-10-CM

## 2021-07-09 DIAGNOSIS — E11.9: ICD-10-CM

## 2021-07-09 DIAGNOSIS — H43.813: ICD-10-CM

## 2021-07-09 DIAGNOSIS — H34.8320: ICD-10-CM

## 2021-07-09 DIAGNOSIS — H59.022: ICD-10-CM

## 2021-07-09 DIAGNOSIS — H35.363: ICD-10-CM

## 2021-07-09 DIAGNOSIS — H35.3122: ICD-10-CM

## 2021-07-09 PROCEDURE — 6702850 BILATERAL INTRAVITREAL INJECTION

## 2021-07-09 PROCEDURE — 92202 OPSCPY EXTND ON/MAC DRAW: CPT

## 2021-07-09 PROCEDURE — C9257 BEVACIZUMAB INJECTION: HCPCS

## 2021-07-09 PROCEDURE — 92014 COMPRE OPH EXAM EST PT 1/>: CPT

## 2021-07-09 PROCEDURE — 92134 CPTRZ OPH DX IMG PST SGM RTA: CPT

## 2021-07-09 ASSESSMENT — VISUAL ACUITY
OD_SC: 20/40-1
OS_SC: 20/50-1+1

## 2021-07-09 ASSESSMENT — TONOMETRY
OS_IOP_MMHG: 15
OD_IOP_MMHG: 13

## 2021-08-02 ENCOUNTER — APPOINTMENT (RX ONLY)
Dept: URBAN - METROPOLITAN AREA CLINIC 151 | Facility: CLINIC | Age: 86
Setting detail: DERMATOLOGY
End: 2021-08-02

## 2021-08-02 DIAGNOSIS — D18.0 HEMANGIOMA: ICD-10-CM

## 2021-08-02 DIAGNOSIS — Z86.007 PERSONAL HISTORY OF IN-SITU NEOPLASM OF SKIN: ICD-10-CM

## 2021-08-02 DIAGNOSIS — L57.0 ACTINIC KERATOSIS: ICD-10-CM

## 2021-08-02 DIAGNOSIS — L81.4 OTHER MELANIN HYPERPIGMENTATION: ICD-10-CM

## 2021-08-02 PROBLEM — D18.01 HEMANGIOMA OF SKIN AND SUBCUTANEOUS TISSUE: Status: ACTIVE | Noted: 2021-08-02

## 2021-08-02 PROBLEM — Z85.828 PERSONAL HISTORY OF OTHER MALIGNANT NEOPLASM OF SKIN: Status: ACTIVE | Noted: 2021-08-02

## 2021-08-02 PROCEDURE — ? COUNSELING

## 2021-08-02 PROCEDURE — ? TREATMENT REGIMEN

## 2021-08-02 PROCEDURE — ? LIQUID NITROGEN

## 2021-08-02 PROCEDURE — 99213 OFFICE O/P EST LOW 20 MIN: CPT | Mod: 25

## 2021-08-02 PROCEDURE — ? FULL BODY SKIN EXAM - DECLINED

## 2021-08-02 PROCEDURE — 17000 DESTRUCT PREMALG LESION: CPT

## 2021-08-02 PROCEDURE — 17003 DESTRUCT PREMALG LES 2-14: CPT

## 2021-08-02 ASSESSMENT — LOCATION SIMPLE DESCRIPTION DERM
LOCATION SIMPLE: CHEST
LOCATION SIMPLE: SCALP
LOCATION SIMPLE: LEFT CHEEK
LOCATION SIMPLE: LEFT FOREHEAD
LOCATION SIMPLE: LEFT SCALP
LOCATION SIMPLE: ABDOMEN

## 2021-08-02 ASSESSMENT — LOCATION ZONE DERM
LOCATION ZONE: FACE
LOCATION ZONE: SCALP
LOCATION ZONE: TRUNK

## 2021-08-02 ASSESSMENT — LOCATION DETAILED DESCRIPTION DERM
LOCATION DETAILED: STERNUM
LOCATION DETAILED: LEFT CENTRAL PARIETAL SCALP
LOCATION DETAILED: LEFT CENTRAL FRONTAL SCALP
LOCATION DETAILED: EPIGASTRIC SKIN
LOCATION DETAILED: RIGHT SUPERIOR PARIETAL SCALP
LOCATION DETAILED: LEFT CENTRAL MALAR CHEEK
LOCATION DETAILED: LEFT SUPERIOR FOREHEAD

## 2021-08-02 NOTE — PROCEDURE: LIQUID NITROGEN
Detail Level: Detailed
Render Note In Bullet Format When Appropriate: No
Post-Care Instructions: I reviewed with the patient in detail post-care instructions. Patient is to wear sunprotection, and avoid picking at any of the treated lesions. Pt may apply Vaseline to crusted or scabbing areas.
Number Of Freeze-Thaw Cycles: 3 freeze-thaw cycles
Consent: The patient's consent was obtained including but not limited to risks of crusting, scabbing, blistering, scarring, darker or lighter pigmentary change, recurrence, incomplete removal and infection.
Show Aperture Variable?: Yes
Duration Of Freeze Thaw-Cycle (Seconds): 2

## 2021-10-14 ENCOUNTER — APPOINTMENT (RX ONLY)
Dept: URBAN - METROPOLITAN AREA CLINIC 151 | Facility: CLINIC | Age: 86
Setting detail: DERMATOLOGY
End: 2021-10-14

## 2021-10-14 DIAGNOSIS — I83.9 ASYMPTOMATIC VARICOSE VEINS OF LOWER EXTREMITIES: ICD-10-CM

## 2021-10-14 DIAGNOSIS — Z71.89 OTHER SPECIFIED COUNSELING: ICD-10-CM

## 2021-10-14 DIAGNOSIS — L82.1 OTHER SEBORRHEIC KERATOSIS: ICD-10-CM

## 2021-10-14 DIAGNOSIS — L21.8 OTHER SEBORRHEIC DERMATITIS: ICD-10-CM | Status: INADEQUATELY CONTROLLED

## 2021-10-14 DIAGNOSIS — L57.0 ACTINIC KERATOSIS: ICD-10-CM

## 2021-10-14 DIAGNOSIS — Z86.007 PERSONAL HISTORY OF IN-SITU NEOPLASM OF SKIN: ICD-10-CM

## 2021-10-14 DIAGNOSIS — D18.0 HEMANGIOMA: ICD-10-CM

## 2021-10-14 DIAGNOSIS — T1490XA CONTUSION OF UNSPECIFIED SITE: ICD-10-CM

## 2021-10-14 PROBLEM — D18.01 HEMANGIOMA OF SKIN AND SUBCUTANEOUS TISSUE: Status: ACTIVE | Noted: 2021-10-14

## 2021-10-14 PROBLEM — I83.92 ASYMPTOMATIC VARICOSE VEINS OF LEFT LOWER EXTREMITY: Status: ACTIVE | Noted: 2021-10-14

## 2021-10-14 PROBLEM — S80.11XA CONTUSION OF RIGHT LOWER LEG, INITIAL ENCOUNTER: Status: ACTIVE | Noted: 2021-10-14

## 2021-10-14 PROBLEM — Z85.828 PERSONAL HISTORY OF OTHER MALIGNANT NEOPLASM OF SKIN: Status: ACTIVE | Noted: 2021-10-14

## 2021-10-14 PROCEDURE — ? COUNSELING

## 2021-10-14 PROCEDURE — 99214 OFFICE O/P EST MOD 30 MIN: CPT | Mod: 25

## 2021-10-14 PROCEDURE — 17003 DESTRUCT PREMALG LES 2-14: CPT

## 2021-10-14 PROCEDURE — ? FULL BODY SKIN EXAM

## 2021-10-14 PROCEDURE — ? PRESCRIPTION

## 2021-10-14 PROCEDURE — 17000 DESTRUCT PREMALG LESION: CPT

## 2021-10-14 PROCEDURE — ? LIQUID NITROGEN

## 2021-10-14 RX ORDER — KETOCONAZOLE 20 MG/ML
SHAMPOO, SUSPENSION TOPICAL
Qty: 1 | Refills: 5 | Status: ERX | COMMUNITY
Start: 2021-10-14

## 2021-10-14 RX ADMIN — KETOCONAZOLE: 20 SHAMPOO, SUSPENSION TOPICAL at 00:00

## 2021-10-14 RX ADMIN — CLOBETASOL PROPIONATE: 0.5 SOLUTION TOPICAL at 00:00

## 2021-10-14 ASSESSMENT — LOCATION DETAILED DESCRIPTION DERM
LOCATION DETAILED: LEFT INFERIOR MEDIAL MIDBACK
LOCATION DETAILED: LEFT MID-UPPER BACK
LOCATION DETAILED: RIGHT INFERIOR FRONTAL SCALP
LOCATION DETAILED: RIGHT MEDIAL FOREHEAD
LOCATION DETAILED: LEFT ANTERIOR PROXIMAL UPPER ARM
LOCATION DETAILED: RIGHT DISTAL POSTERIOR UPPER ARM
LOCATION DETAILED: SUPRAPUBIC SKIN
LOCATION DETAILED: LEFT SUPERIOR FOREHEAD
LOCATION DETAILED: RIGHT SUPERIOR PARIETAL SCALP
LOCATION DETAILED: PERIUMBILICAL SKIN
LOCATION DETAILED: RIGHT MID-UPPER BACK
LOCATION DETAILED: RIGHT FOREHEAD
LOCATION DETAILED: MID-OCCIPITAL SCALP
LOCATION DETAILED: LEFT CENTRAL FRONTAL SCALP
LOCATION DETAILED: LEFT SUPERIOR UPPER BACK
LOCATION DETAILED: RIGHT DISTAL PRETIBIAL REGION
LOCATION DETAILED: LEFT CLAVICULAR SKIN
LOCATION DETAILED: EPIGASTRIC SKIN
LOCATION DETAILED: LEFT PROXIMAL PRETIBIAL REGION
LOCATION DETAILED: LEFT ANTERIOR LATERAL DISTAL THIGH

## 2021-10-14 ASSESSMENT — LOCATION SIMPLE DESCRIPTION DERM
LOCATION SIMPLE: LEFT CLAVICULAR SKIN
LOCATION SIMPLE: POSTERIOR SCALP
LOCATION SIMPLE: RIGHT FOREHEAD
LOCATION SIMPLE: LEFT UPPER BACK
LOCATION SIMPLE: LEFT FOREHEAD
LOCATION SIMPLE: GROIN
LOCATION SIMPLE: LEFT THIGH
LOCATION SIMPLE: LEFT UPPER ARM
LOCATION SIMPLE: LEFT PRETIBIAL REGION
LOCATION SIMPLE: SCALP
LOCATION SIMPLE: LEFT LOWER BACK
LOCATION SIMPLE: RIGHT PRETIBIAL REGION
LOCATION SIMPLE: RIGHT UPPER BACK
LOCATION SIMPLE: RIGHT UPPER ARM
LOCATION SIMPLE: ABDOMEN

## 2021-10-14 ASSESSMENT — LOCATION ZONE DERM
LOCATION ZONE: TRUNK
LOCATION ZONE: FACE
LOCATION ZONE: LEG
LOCATION ZONE: SCALP
LOCATION ZONE: ARM

## 2021-10-14 NOTE — HPI: HISTORY OF SQUAMOUS CELL CARCINOMA
What Is The Reason For Today's Visit?: History of Squamous Cell Carcinoma
How Many Sccs Have You Had?: one
When Was Your Last Cancer Diagnosed?: 01/10/2020

## 2021-10-14 NOTE — PROCEDURE: LIQUID NITROGEN
Consent: The patient's consent was obtained including but not limited to risks of crusting, scabbing, blistering, scarring, darker or lighter pigmentary change, recurrence, incomplete removal and infection.
Duration Of Freeze Thaw-Cycle (Seconds): 5
Render Note In Bullet Format When Appropriate: No
Detail Level: Detailed
Number Of Freeze-Thaw Cycles: 2 freeze-thaw cycles
Show Applicator Variable?: Yes
Post-Care Instructions: I reviewed with the patient in detail post-care instructions. Patient is to wear sunprotection, and avoid picking at any of the treated lesions. Pt may apply Vaseline to crusted or scabbing areas.

## 2021-10-15 RX ORDER — CLOBETASOL PROPIONATE 0.5 MG/ML
SOLUTION TOPICAL
Qty: 1 | Refills: 5 | Status: ERX | COMMUNITY
Start: 2021-10-14

## 2022-01-20 ENCOUNTER — COMPREHENSIVE EXAM (OUTPATIENT)
Dept: URBAN - METROPOLITAN AREA CLINIC 43 | Facility: CLINIC | Age: 87
End: 2022-01-20

## 2022-01-20 DIAGNOSIS — H59.022: ICD-10-CM

## 2022-01-20 DIAGNOSIS — E11.9: ICD-10-CM

## 2022-01-20 DIAGNOSIS — H34.8320: ICD-10-CM

## 2022-01-20 DIAGNOSIS — H43.813: ICD-10-CM

## 2022-01-20 DIAGNOSIS — H35.363: ICD-10-CM

## 2022-01-20 DIAGNOSIS — H35.352: ICD-10-CM

## 2022-01-20 DIAGNOSIS — H35.3211: ICD-10-CM

## 2022-01-20 DIAGNOSIS — H35.3122: ICD-10-CM

## 2022-01-20 PROCEDURE — 92014 COMPRE OPH EXAM EST PT 1/>: CPT

## 2022-01-20 PROCEDURE — 92134 CPTRZ OPH DX IMG PST SGM RTA: CPT

## 2022-01-20 PROCEDURE — 92202 OPSCPY EXTND ON/MAC DRAW: CPT

## 2022-01-20 ASSESSMENT — TONOMETRY
OD_IOP_MMHG: 15
OS_IOP_MMHG: 16

## 2022-01-20 ASSESSMENT — VISUAL ACUITY
OS_SC: 20/60
OD_SC: 20/40-2

## 2022-01-27 ENCOUNTER — CLINIC PROCEDURE ONLY (OUTPATIENT)
Dept: URBAN - METROPOLITAN AREA CLINIC 43 | Facility: CLINIC | Age: 87
End: 2022-01-27

## 2022-01-27 DIAGNOSIS — H35.3211: ICD-10-CM

## 2022-01-27 DIAGNOSIS — H34.8320: ICD-10-CM

## 2022-01-27 PROCEDURE — C9257 BEVACIZUMAB INJECTION: HCPCS

## 2022-01-27 PROCEDURE — 6702850 BILATERAL INTRAVITREAL INJECTION

## 2022-01-27 ASSESSMENT — TONOMETRY
OD_IOP_MMHG: 16
OS_IOP_MMHG: 17

## 2022-01-27 ASSESSMENT — VISUAL ACUITY
OS_SC: 20/40-1
OD_SC: 20/25-1

## 2022-01-31 NOTE — PROCEDURE: MOHS SURGERY
Faxed lov notes to [facility] @ [phone]. Confirmation number [id number]. Original form placed in scan folder for central scanning. Area M Indication Text: Tumors in this location are included in Area M (cheek, forehead, scalp, neck, jawline and pretibial skin).  Mohs surgery is indicated for tumors in these anatomic locations.

## 2022-02-07 NOTE — PATIENT PROFILE ADULT - [id number]
Patient is contacted and he said he can put his name on a list to maybe be seen in a couple weeks.  He is not having issues at this time.   family

## 2022-03-14 ENCOUNTER — ESTABLISHED PATIENT (OUTPATIENT)
Dept: URBAN - METROPOLITAN AREA CLINIC 36 | Facility: CLINIC | Age: 87
End: 2022-03-14

## 2022-03-14 DIAGNOSIS — H43.813: ICD-10-CM

## 2022-03-14 DIAGNOSIS — H35.363: ICD-10-CM

## 2022-03-14 DIAGNOSIS — H35.3211: ICD-10-CM

## 2022-03-14 DIAGNOSIS — H34.8320: ICD-10-CM

## 2022-03-14 DIAGNOSIS — E11.9: ICD-10-CM

## 2022-03-14 DIAGNOSIS — H35.352: ICD-10-CM

## 2022-03-14 DIAGNOSIS — H35.3122: ICD-10-CM

## 2022-03-14 PROCEDURE — 92202 OPSCPY EXTND ON/MAC DRAW: CPT

## 2022-03-14 PROCEDURE — 92134 CPTRZ OPH DX IMG PST SGM RTA: CPT

## 2022-03-14 PROCEDURE — 6702850 BILATERAL INTRAVITREAL INJECTION

## 2022-03-14 PROCEDURE — 92014 COMPRE OPH EXAM EST PT 1/>: CPT

## 2022-03-14 PROCEDURE — C9257 BEVACIZUMAB INJECTION: HCPCS

## 2022-03-14 ASSESSMENT — TONOMETRY
OS_IOP_MMHG: 13
OD_IOP_MMHG: 14

## 2022-03-14 ASSESSMENT — VISUAL ACUITY
OD_SC: 20/40+2
OS_SC: 20/70-1

## 2022-04-20 ENCOUNTER — CLINIC PROCEDURE ONLY (OUTPATIENT)
Dept: URBAN - METROPOLITAN AREA CLINIC 43 | Facility: CLINIC | Age: 87
End: 2022-04-20

## 2022-04-20 DIAGNOSIS — H35.3211: ICD-10-CM

## 2022-04-20 DIAGNOSIS — H34.8320: ICD-10-CM

## 2022-04-20 PROCEDURE — C9257 BEVACIZUMAB INJECTION: HCPCS

## 2022-04-20 PROCEDURE — 6702850 BILATERAL INTRAVITREAL INJECTION

## 2022-04-20 ASSESSMENT — TONOMETRY
OD_IOP_MMHG: 16
OS_IOP_MMHG: 17

## 2022-04-20 ASSESSMENT — VISUAL ACUITY
OS_SC: 20/50-1
OD_SC: 20/30+2

## 2022-04-26 ENCOUNTER — APPOINTMENT (RX ONLY)
Dept: URBAN - METROPOLITAN AREA CLINIC 151 | Facility: CLINIC | Age: 87
Setting detail: DERMATOLOGY
End: 2022-04-26

## 2022-04-26 DIAGNOSIS — L21.8 OTHER SEBORRHEIC DERMATITIS: ICD-10-CM | Status: INADEQUATELY CONTROLLED

## 2022-04-26 DIAGNOSIS — L81.4 OTHER MELANIN HYPERPIGMENTATION: ICD-10-CM

## 2022-04-26 DIAGNOSIS — L57.0 ACTINIC KERATOSIS: ICD-10-CM

## 2022-04-26 DIAGNOSIS — Z86.007 PERSONAL HISTORY OF IN-SITU NEOPLASM OF SKIN: ICD-10-CM

## 2022-04-26 DIAGNOSIS — Z71.89 OTHER SPECIFIED COUNSELING: ICD-10-CM

## 2022-04-26 PROCEDURE — 17003 DESTRUCT PREMALG LES 2-14: CPT

## 2022-04-26 PROCEDURE — 17000 DESTRUCT PREMALG LESION: CPT

## 2022-04-26 PROCEDURE — ? FULL BODY SKIN EXAM - DECLINED

## 2022-04-26 PROCEDURE — ? COUNSELING

## 2022-04-26 PROCEDURE — ? LIQUID NITROGEN

## 2022-04-26 PROCEDURE — 99214 OFFICE O/P EST MOD 30 MIN: CPT | Mod: 25

## 2022-04-26 PROCEDURE — ? PRESCRIPTION

## 2022-04-26 RX ORDER — KETOCONAZOLE 20 MG/ML
SHAMPOO, SUSPENSION TOPICAL
Qty: 120 | Refills: 4 | Status: ERX

## 2022-04-26 RX ORDER — HYDROCORTISONE 25 MG/G
CREAM TOPICAL
Qty: 30 | Refills: 5 | Status: ERX | COMMUNITY
Start: 2022-04-26

## 2022-04-26 RX ADMIN — HYDROCORTISONE: 25 CREAM TOPICAL at 00:00

## 2022-04-26 ASSESSMENT — LOCATION SIMPLE DESCRIPTION DERM
LOCATION SIMPLE: RIGHT FOREHEAD
LOCATION SIMPLE: SCALP
LOCATION SIMPLE: RIGHT CHEEK
LOCATION SIMPLE: ANTERIOR SCALP
LOCATION SIMPLE: LEFT SCALP
LOCATION SIMPLE: LEFT FOREHEAD

## 2022-04-26 ASSESSMENT — LOCATION DETAILED DESCRIPTION DERM
LOCATION DETAILED: LEFT CENTRAL PARIETAL SCALP
LOCATION DETAILED: LEFT SUPERIOR FOREHEAD
LOCATION DETAILED: MID-FRONTAL SCALP
LOCATION DETAILED: RIGHT INFERIOR FOREHEAD
LOCATION DETAILED: LEFT SUPERIOR PARIETAL SCALP
LOCATION DETAILED: LEFT SUPERIOR POSTAURICULAR SKIN
LOCATION DETAILED: LEFT MEDIAL FRONTAL SCALP
LOCATION DETAILED: RIGHT INFERIOR CENTRAL MALAR CHEEK
LOCATION DETAILED: RIGHT SUPERIOR POSTAURICULAR SKIN

## 2022-04-26 ASSESSMENT — LOCATION ZONE DERM
LOCATION ZONE: FACE
LOCATION ZONE: SCALP

## 2022-04-26 NOTE — PROCEDURE: LIQUID NITROGEN
Render Note In Bullet Format When Appropriate: No
Show Applicator Variable?: Yes
Detail Level: Detailed
Consent: The patient's consent was obtained including but not limited to risks of crusting, scabbing, blistering, scarring, darker or lighter pigmentary change, recurrence, incomplete removal and infection.
Number Of Freeze-Thaw Cycles: 2 freeze-thaw cycles
Post-Care Instructions: I reviewed with the patient in detail post-care instructions. Patient is to wear sunprotection, and avoid picking at any of the treated lesions. Pt may apply Vaseline to crusted or scabbing areas.
Duration Of Freeze Thaw-Cycle (Seconds): 5

## 2022-06-01 ENCOUNTER — ESTABLISHED PATIENT (OUTPATIENT)
Dept: URBAN - METROPOLITAN AREA CLINIC 43 | Facility: CLINIC | Age: 87
End: 2022-06-01

## 2022-06-01 DIAGNOSIS — H35.3122: ICD-10-CM

## 2022-06-01 DIAGNOSIS — H35.352: ICD-10-CM

## 2022-06-01 DIAGNOSIS — H35.3211: ICD-10-CM

## 2022-06-01 DIAGNOSIS — H35.363: ICD-10-CM

## 2022-06-01 DIAGNOSIS — H43.813: ICD-10-CM

## 2022-06-01 DIAGNOSIS — H35.372: ICD-10-CM

## 2022-06-01 DIAGNOSIS — E11.9: ICD-10-CM

## 2022-06-01 DIAGNOSIS — H34.8320: ICD-10-CM

## 2022-06-01 DIAGNOSIS — H26.492: ICD-10-CM

## 2022-06-01 PROCEDURE — 92202 OPSCPY EXTND ON/MAC DRAW: CPT

## 2022-06-01 PROCEDURE — C9257 BEVACIZUMAB INJECTION: HCPCS

## 2022-06-01 PROCEDURE — 92134 CPTRZ OPH DX IMG PST SGM RTA: CPT

## 2022-06-01 PROCEDURE — 92014 COMPRE OPH EXAM EST PT 1/>: CPT

## 2022-06-01 PROCEDURE — 6702850 BILATERAL INTRAVITREAL INJECTION

## 2022-06-01 ASSESSMENT — VISUAL ACUITY
OS_SC: 20/80-1
OD_SC: 20/40-1

## 2022-06-01 ASSESSMENT — TONOMETRY
OD_IOP_MMHG: 11
OS_IOP_MMHG: 12

## 2022-07-15 ENCOUNTER — ESTABLISHED PATIENT (OUTPATIENT)
Dept: URBAN - METROPOLITAN AREA CLINIC 43 | Facility: CLINIC | Age: 87
End: 2022-07-15

## 2022-07-15 DIAGNOSIS — H35.3211: ICD-10-CM

## 2022-07-15 DIAGNOSIS — H35.352: ICD-10-CM

## 2022-07-15 DIAGNOSIS — H35.722: ICD-10-CM

## 2022-07-15 DIAGNOSIS — H35.731: ICD-10-CM

## 2022-07-15 DIAGNOSIS — H34.8320: ICD-10-CM

## 2022-07-15 DIAGNOSIS — H59.022: ICD-10-CM

## 2022-07-15 DIAGNOSIS — H35.363: ICD-10-CM

## 2022-07-15 DIAGNOSIS — H35.3122: ICD-10-CM

## 2022-07-15 DIAGNOSIS — H35.372: ICD-10-CM

## 2022-07-15 PROCEDURE — 92242 FLUORESCEIN&ICG ANGIOGRAPHY: CPT

## 2022-07-15 PROCEDURE — 99214 OFFICE O/P EST MOD 30 MIN: CPT

## 2022-07-15 PROCEDURE — 92250 FUNDUS PHOTOGRAPHY W/I&R: CPT

## 2022-07-15 PROCEDURE — C9257 BEVACIZUMAB INJECTION: HCPCS

## 2022-07-15 PROCEDURE — 92273 FULL FIELD ERG W/I&R: CPT

## 2022-07-15 PROCEDURE — 6702850 BILATERAL INTRAVITREAL INJECTION

## 2022-07-15 ASSESSMENT — VISUAL ACUITY
OD_SC: 20/40-2
OS_SC: 20/70+1

## 2022-07-15 ASSESSMENT — TONOMETRY
OS_IOP_MMHG: 10
OD_IOP_MMHG: 12

## 2022-08-25 ENCOUNTER — NEW PATIENT (OUTPATIENT)
Dept: URBAN - METROPOLITAN AREA CLINIC 26 | Facility: CLINIC | Age: 87
End: 2022-08-25

## 2022-08-25 VITALS
BODY MASS INDEX: 22.9 KG/M2 | HEART RATE: 59 BPM | DIASTOLIC BLOOD PRESSURE: 78 MMHG | WEIGHT: 160 LBS | HEIGHT: 70 IN | SYSTOLIC BLOOD PRESSURE: 157 MMHG

## 2022-08-25 DIAGNOSIS — H35.3231: ICD-10-CM

## 2022-08-25 DIAGNOSIS — H34.8320: ICD-10-CM

## 2022-08-25 DIAGNOSIS — H43.813: ICD-10-CM

## 2022-08-25 DIAGNOSIS — H35.042: ICD-10-CM

## 2022-08-25 DIAGNOSIS — H04.123: ICD-10-CM

## 2022-08-25 DIAGNOSIS — H35.62: ICD-10-CM

## 2022-08-25 PROCEDURE — 99204 OFFICE O/P NEW MOD 45 MIN: CPT

## 2022-08-25 PROCEDURE — 92134 CPTRZ OPH DX IMG PST SGM RTA: CPT

## 2022-08-25 PROCEDURE — 67028 INJECTION EYE DRUG: CPT

## 2022-08-25 PROCEDURE — 92250 FUNDUS PHOTOGRAPHY W/I&R: CPT

## 2022-08-25 ASSESSMENT — TONOMETRY
OS_IOP_MMHG: 13
OD_IOP_MMHG: 12

## 2022-08-25 ASSESSMENT — VISUAL ACUITY
OD_SC: 20/30-2
OS_SC: 20/40-2

## 2022-10-10 ENCOUNTER — CLINIC PROCEDURE ONLY (OUTPATIENT)
Dept: URBAN - METROPOLITAN AREA CLINIC 26 | Facility: CLINIC | Age: 87
End: 2022-10-10

## 2022-10-10 DIAGNOSIS — H34.8320: ICD-10-CM

## 2022-10-10 DIAGNOSIS — H35.3231: ICD-10-CM

## 2022-10-10 PROCEDURE — 92250 FUNDUS PHOTOGRAPHY W/I&R: CPT

## 2022-10-10 PROCEDURE — 92134 CPTRZ OPH DX IMG PST SGM RTA: CPT

## 2022-10-10 PROCEDURE — 6702850 BILATERAL INTRAVITREAL INJECTION

## 2022-10-10 ASSESSMENT — TONOMETRY
OD_IOP_MMHG: 16
OS_IOP_MMHG: 13

## 2022-10-10 ASSESSMENT — VISUAL ACUITY
OS_SC: 20/50-2
OD_SC: 20/40+2

## 2022-11-21 ENCOUNTER — CLINIC PROCEDURE ONLY (OUTPATIENT)
Dept: URBAN - METROPOLITAN AREA CLINIC 26 | Facility: CLINIC | Age: 87
End: 2022-11-21

## 2022-11-21 DIAGNOSIS — H35.3231: ICD-10-CM

## 2022-11-21 DIAGNOSIS — H34.8320: ICD-10-CM

## 2022-11-21 PROCEDURE — 92250 FUNDUS PHOTOGRAPHY W/I&R: CPT

## 2022-11-21 PROCEDURE — 92134 CPTRZ OPH DX IMG PST SGM RTA: CPT

## 2022-11-21 PROCEDURE — 6702850 BILATERAL INTRAVITREAL INJECTION

## 2022-11-21 ASSESSMENT — VISUAL ACUITY
OD_CC: 20/40+2
OS_CC: 20/60-2

## 2022-11-21 ASSESSMENT — TONOMETRY
OS_IOP_MMHG: 15
OD_IOP_MMHG: 12

## 2023-01-04 ENCOUNTER — CLINIC PROCEDURE ONLY (OUTPATIENT)
Dept: URBAN - METROPOLITAN AREA CLINIC 26 | Facility: CLINIC | Age: 88
End: 2023-01-04

## 2023-01-04 DIAGNOSIS — H35.3231: ICD-10-CM

## 2023-01-04 DIAGNOSIS — H34.8320: ICD-10-CM

## 2023-01-04 PROCEDURE — 92134 CPTRZ OPH DX IMG PST SGM RTA: CPT

## 2023-01-04 PROCEDURE — 92250 FUNDUS PHOTOGRAPHY W/I&R: CPT

## 2023-01-04 PROCEDURE — 6702850 BILATERAL INTRAVITREAL INJECTION

## 2023-01-04 ASSESSMENT — VISUAL ACUITY
OS_SC: 20/60+1
OD_SC: 20/40+2

## 2023-01-04 ASSESSMENT — TONOMETRY
OS_IOP_MMHG: 18
OD_IOP_MMHG: 19

## 2023-04-12 ENCOUNTER — CLINIC PROCEDURE ONLY (OUTPATIENT)
Dept: URBAN - METROPOLITAN AREA CLINIC 26 | Facility: CLINIC | Age: 88
End: 2023-04-12

## 2023-04-12 DIAGNOSIS — H35.3231: ICD-10-CM

## 2023-04-12 DIAGNOSIS — H34.8320: ICD-10-CM

## 2023-04-12 PROCEDURE — 92250 FUNDUS PHOTOGRAPHY W/I&R: CPT

## 2023-04-12 PROCEDURE — 67028 INJECTION EYE DRUG: CPT

## 2023-04-12 PROCEDURE — 92134 CPTRZ OPH DX IMG PST SGM RTA: CPT

## 2023-04-12 ASSESSMENT — VISUAL ACUITY
OS_SC: 20/60-1
OD_SC: 20/40-1

## 2023-04-12 ASSESSMENT — TONOMETRY
OD_IOP_MMHG: 17
OS_IOP_MMHG: 14

## 2023-05-10 ENCOUNTER — APPOINTMENT (RX ONLY)
Dept: URBAN - METROPOLITAN AREA CLINIC 148 | Facility: CLINIC | Age: 88
Setting detail: DERMATOLOGY
End: 2023-05-10

## 2023-05-10 DIAGNOSIS — Z85.828 PERSONAL HISTORY OF OTHER MALIGNANT NEOPLASM OF SKIN: ICD-10-CM

## 2023-05-10 DIAGNOSIS — L82.1 OTHER SEBORRHEIC KERATOSIS: ICD-10-CM

## 2023-05-10 DIAGNOSIS — L57.0 ACTINIC KERATOSIS: ICD-10-CM

## 2023-05-10 DIAGNOSIS — D485 NEOPLASM OF UNCERTAIN BEHAVIOR OF SKIN: ICD-10-CM

## 2023-05-10 DIAGNOSIS — L81.4 OTHER MELANIN HYPERPIGMENTATION: ICD-10-CM

## 2023-05-10 PROBLEM — D48.5 NEOPLASM OF UNCERTAIN BEHAVIOR OF SKIN: Status: ACTIVE | Noted: 2023-05-10

## 2023-05-10 PROCEDURE — ? COUNSELING

## 2023-05-10 PROCEDURE — 11102 TANGNTL BX SKIN SINGLE LES: CPT

## 2023-05-10 PROCEDURE — 17000 DESTRUCT PREMALG LESION: CPT | Mod: 59

## 2023-05-10 PROCEDURE — ? LIQUID NITROGEN

## 2023-05-10 PROCEDURE — 99213 OFFICE O/P EST LOW 20 MIN: CPT | Mod: 25

## 2023-05-10 PROCEDURE — ? SUNSCREEN RECOMMENDATIONS

## 2023-05-10 PROCEDURE — 17003 DESTRUCT PREMALG LES 2-14: CPT

## 2023-05-10 PROCEDURE — ? BIOPSY BY SHAVE METHOD

## 2023-05-10 ASSESSMENT — LOCATION SIMPLE DESCRIPTION DERM
LOCATION SIMPLE: RIGHT UPPER ARM
LOCATION SIMPLE: LEFT FOREARM
LOCATION SIMPLE: RIGHT UPPER BACK
LOCATION SIMPLE: CHEST
LOCATION SIMPLE: SCALP
LOCATION SIMPLE: LEFT FOREHEAD
LOCATION SIMPLE: LEFT UPPER BACK
LOCATION SIMPLE: LEFT PRETIBIAL REGION
LOCATION SIMPLE: LEFT HAND
LOCATION SIMPLE: RIGHT EAR
LOCATION SIMPLE: LEFT CHEEK
LOCATION SIMPLE: RIGHT PRETIBIAL REGION

## 2023-05-10 ASSESSMENT — LOCATION DETAILED DESCRIPTION DERM
LOCATION DETAILED: RIGHT SUPERIOR PARIETAL SCALP
LOCATION DETAILED: LEFT ULNAR DORSAL HAND
LOCATION DETAILED: LEFT MEDIAL INFERIOR CHEST
LOCATION DETAILED: RIGHT SUPERIOR MEDIAL UPPER BACK
LOCATION DETAILED: LEFT SUPERIOR PARIETAL SCALP
LOCATION DETAILED: RIGHT DISTAL POSTERIOR UPPER ARM
LOCATION DETAILED: RIGHT ANTIHELIX
LOCATION DETAILED: LEFT LATERAL SUPERIOR CHEST
LOCATION DETAILED: LEFT DISTAL DORSAL FOREARM
LOCATION DETAILED: LEFT RADIAL DORSAL HAND
LOCATION DETAILED: LEFT SUPERIOR UPPER BACK
LOCATION DETAILED: RIGHT SUPERIOR HELIX
LOCATION DETAILED: RIGHT MEDIAL INFERIOR CHEST
LOCATION DETAILED: LEFT PROXIMAL PRETIBIAL REGION
LOCATION DETAILED: LEFT PROXIMAL DORSAL FOREARM
LOCATION DETAILED: LEFT SUPERIOR LATERAL BUCCAL CHEEK
LOCATION DETAILED: RIGHT PROXIMAL PRETIBIAL REGION
LOCATION DETAILED: LEFT SUPERIOR MEDIAL FOREHEAD

## 2023-05-10 ASSESSMENT — LOCATION ZONE DERM
LOCATION ZONE: SCALP
LOCATION ZONE: HAND
LOCATION ZONE: FACE
LOCATION ZONE: LEG
LOCATION ZONE: EAR
LOCATION ZONE: TRUNK
LOCATION ZONE: ARM

## 2023-05-10 NOTE — PROCEDURE: MIPS QUALITY
Detail Level: Detailed
Quality 431: Preventive Care And Screening: Unhealthy Alcohol Use - Screening: Patient not identified as an unhealthy alcohol user when screened for unhealthy alcohol use using a systematic screening method
Quality 226: Preventive Care And Screening: Tobacco Use: Screening And Cessation Intervention: Patient screened for tobacco use and is an ex/non-smoker
Quality 130: Documentation Of Current Medications In The Medical Record: Current Medications Documented
Quality 47: Advance Care Plan: Advance Care Planning discussed and documented; advance care plan or surrogate decision maker documented in the medical record.
Quality 110: Preventive Care And Screening: Influenza Immunization: Influenza Immunization Administered during Influenza season

## 2023-05-10 NOTE — PROCEDURE: LIQUID NITROGEN
Show Applicator Variable?: Yes
Consent: The patient's consent was obtained including but not limited to risks of crusting, scabbing, blistering, scarring, darker or lighter pigmentary change, recurrence, incomplete removal and infection.
Post-Care Instructions: I reviewed with the patient in detail post-care instructions. Patient is to wear sunprotection, and avoid picking at any of the treated lesions. Pt may apply Vaseline to crusted or scabbing areas.
Number Of Freeze-Thaw Cycles: 2 freeze-thaw cycles
Detail Level: Detailed
Render Note In Bullet Format When Appropriate: No
Duration Of Freeze Thaw-Cycle (Seconds): 0

## 2023-06-01 ENCOUNTER — CLINIC PROCEDURE ONLY (OUTPATIENT)
Dept: URBAN - METROPOLITAN AREA CLINIC 26 | Facility: CLINIC | Age: 88
End: 2023-06-01

## 2023-06-01 DIAGNOSIS — H35.3231: ICD-10-CM

## 2023-06-01 DIAGNOSIS — H34.8320: ICD-10-CM

## 2023-06-01 DIAGNOSIS — H34.211: ICD-10-CM

## 2023-06-01 DIAGNOSIS — H35.62: ICD-10-CM

## 2023-06-01 DIAGNOSIS — H35.042: ICD-10-CM

## 2023-06-01 DIAGNOSIS — H43.813: ICD-10-CM

## 2023-06-01 PROCEDURE — 92250 FUNDUS PHOTOGRAPHY W/I&R: CPT

## 2023-06-01 PROCEDURE — 92014 COMPRE OPH EXAM EST PT 1/>: CPT

## 2023-06-01 PROCEDURE — 6702850 BILATERAL INTRAVITREAL INJECTION

## 2023-06-01 PROCEDURE — 92134 CPTRZ OPH DX IMG PST SGM RTA: CPT

## 2023-06-01 ASSESSMENT — TONOMETRY
OS_IOP_MMHG: 14
OD_IOP_MMHG: 19

## 2023-06-01 ASSESSMENT — VISUAL ACUITY
OD_SC: 20/40-1
OS_SC: 20/50-2

## 2023-07-19 ENCOUNTER — CLINIC PROCEDURE ONLY (OUTPATIENT)
Dept: URBAN - METROPOLITAN AREA CLINIC 26 | Facility: CLINIC | Age: 88
End: 2023-07-19

## 2023-07-19 DIAGNOSIS — H34.8320: ICD-10-CM

## 2023-07-19 DIAGNOSIS — H34.211: ICD-10-CM

## 2023-07-19 DIAGNOSIS — H35.3231: ICD-10-CM

## 2023-07-19 PROCEDURE — 92250 FUNDUS PHOTOGRAPHY W/I&R: CPT

## 2023-07-19 PROCEDURE — 6702850 BILATERAL INTRAVITREAL INJECTION

## 2023-07-19 PROCEDURE — 92134 CPTRZ OPH DX IMG PST SGM RTA: CPT

## 2023-07-19 ASSESSMENT — TONOMETRY
OS_IOP_MMHG: 11
OD_IOP_MMHG: 14

## 2023-07-19 ASSESSMENT — VISUAL ACUITY
OS_SC: 20/60-1
OD_SC: 20/30-1

## 2023-08-23 ENCOUNTER — CLINIC PROCEDURE ONLY (OUTPATIENT)
Dept: URBAN - METROPOLITAN AREA CLINIC 26 | Facility: CLINIC | Age: 88
End: 2023-08-23

## 2023-08-23 DIAGNOSIS — H34.211: ICD-10-CM

## 2023-08-23 DIAGNOSIS — H35.3231: ICD-10-CM

## 2023-08-23 DIAGNOSIS — H35.62: ICD-10-CM

## 2023-08-23 DIAGNOSIS — H34.8320: ICD-10-CM

## 2023-08-23 PROCEDURE — 92250 FUNDUS PHOTOGRAPHY W/I&R: CPT

## 2023-08-23 PROCEDURE — 6702850 BILATERAL INTRAVITREAL INJECTION

## 2023-08-23 PROCEDURE — 92134 CPTRZ OPH DX IMG PST SGM RTA: CPT

## 2023-08-23 ASSESSMENT — VISUAL ACUITY
OS_SC: 20/60-2
OD_SC: 20/40-1

## 2023-08-23 ASSESSMENT — TONOMETRY
OS_IOP_MMHG: 12
OD_IOP_MMHG: 12

## 2023-09-01 ENCOUNTER — APPOINTMENT (RX ONLY)
Dept: URBAN - METROPOLITAN AREA CLINIC 148 | Facility: CLINIC | Age: 88
Setting detail: DERMATOLOGY
End: 2023-09-01

## 2023-09-01 DIAGNOSIS — L21.8 OTHER SEBORRHEIC DERMATITIS: ICD-10-CM

## 2023-09-01 DIAGNOSIS — D485 NEOPLASM OF UNCERTAIN BEHAVIOR OF SKIN: ICD-10-CM

## 2023-09-01 PROBLEM — D48.5 NEOPLASM OF UNCERTAIN BEHAVIOR OF SKIN: Status: ACTIVE | Noted: 2023-09-01

## 2023-09-01 PROCEDURE — ? COUNSELING

## 2023-09-01 PROCEDURE — ? BIOPSY BY SHAVE METHOD

## 2023-09-01 PROCEDURE — ? PRESCRIPTION

## 2023-09-01 PROCEDURE — 99214 OFFICE O/P EST MOD 30 MIN: CPT | Mod: 25

## 2023-09-01 PROCEDURE — 11102 TANGNTL BX SKIN SINGLE LES: CPT

## 2023-09-01 RX ORDER — KETOCONAZOLE 20 MG/ML
SHAMPOO, SUSPENSION TOPICAL
Qty: 120 | Refills: 4 | Status: ERX

## 2023-09-01 RX ORDER — HYDROCORTISONE 25 MG/G
CREAM TOPICAL
Qty: 30 | Refills: 5 | Status: ERX

## 2023-09-01 RX ORDER — FLUOCINONIDE 0.5 MG/ML
SOLUTION TOPICAL
Qty: 60 | Refills: 2 | Status: ERX | COMMUNITY
Start: 2023-09-01

## 2023-09-01 RX ADMIN — FLUOCINONIDE: 0.5 SOLUTION TOPICAL at 00:00

## 2023-09-01 ASSESSMENT — LOCATION ZONE DERM: LOCATION ZONE: SCALP

## 2023-09-01 ASSESSMENT — LOCATION SIMPLE DESCRIPTION DERM
LOCATION SIMPLE: SCALP
LOCATION SIMPLE: ANTERIOR SCALP
LOCATION SIMPLE: LEFT SCALP

## 2023-09-01 ASSESSMENT — LOCATION DETAILED DESCRIPTION DERM
LOCATION DETAILED: LEFT SUPERIOR POSTAURICULAR SKIN
LOCATION DETAILED: RIGHT SUPERIOR POSTAURICULAR SKIN
LOCATION DETAILED: MID-FRONTAL SCALP
LOCATION DETAILED: LEFT MEDIAL FRONTAL SCALP

## 2023-09-27 ENCOUNTER — APPOINTMENT (RX ONLY)
Dept: URBAN - METROPOLITAN AREA CLINIC 148 | Facility: CLINIC | Age: 88
Setting detail: DERMATOLOGY
End: 2023-09-27

## 2023-09-27 PROBLEM — C44.329 SQUAMOUS CELL CARCINOMA OF SKIN OF OTHER PARTS OF FACE: Status: ACTIVE | Noted: 2023-09-27

## 2023-09-27 PROCEDURE — ? PRESCRIPTION

## 2023-09-27 PROCEDURE — ? MOHS SURGERY

## 2023-09-27 PROCEDURE — 17312 MOHS ADDL STAGE: CPT

## 2023-09-27 PROCEDURE — 15240 FTH/GFT F/C/C/M/N/AX/G/H/F20: CPT

## 2023-09-27 PROCEDURE — 15241 FTH/GFT F/C/C/M/N/A/G/H/F EA: CPT

## 2023-09-27 PROCEDURE — 17311 MOHS 1 STAGE H/N/HF/G: CPT

## 2023-09-27 RX ORDER — GENTAMICIN SULFATE 1 MG/G
OINTMENT TOPICAL
Qty: 30 | Refills: 1 | Status: ERX | COMMUNITY
Start: 2023-09-27

## 2023-09-27 RX ADMIN — GENTAMICIN SULFATE: 1 OINTMENT TOPICAL at 00:00

## 2023-10-04 ENCOUNTER — CLINIC PROCEDURE ONLY (OUTPATIENT)
Dept: URBAN - METROPOLITAN AREA CLINIC 26 | Facility: CLINIC | Age: 88
End: 2023-10-04

## 2023-10-04 DIAGNOSIS — H34.8320: ICD-10-CM

## 2023-10-04 DIAGNOSIS — H35.3231: ICD-10-CM

## 2023-10-04 PROCEDURE — 92134 CPTRZ OPH DX IMG PST SGM RTA: CPT

## 2023-10-04 PROCEDURE — 92250 FUNDUS PHOTOGRAPHY W/I&R: CPT | Mod: 59

## 2023-10-04 PROCEDURE — 6702850 BILATERAL INTRAVITREAL INJECTION

## 2023-10-04 PROCEDURE — J2777S SAMPLE VABYSMO

## 2023-10-04 ASSESSMENT — TONOMETRY
OS_IOP_MMHG: 12
OD_IOP_MMHG: 15

## 2023-10-04 ASSESSMENT — VISUAL ACUITY
OD_SC: 20/40+1
OS_SC: 20/80-2

## 2023-10-11 ENCOUNTER — APPOINTMENT (RX ONLY)
Dept: URBAN - METROPOLITAN AREA CLINIC 148 | Facility: CLINIC | Age: 88
Setting detail: DERMATOLOGY
End: 2023-10-11

## 2023-10-11 DIAGNOSIS — Z48.02 ENCOUNTER FOR REMOVAL OF SUTURES: ICD-10-CM

## 2023-10-11 PROCEDURE — 99024 POSTOP FOLLOW-UP VISIT: CPT

## 2023-10-11 PROCEDURE — ? SUTURE REMOVAL (GLOBAL PERIOD)

## 2023-10-11 ASSESSMENT — LOCATION SIMPLE DESCRIPTION DERM: LOCATION SIMPLE: LEFT FOREHEAD

## 2023-10-11 ASSESSMENT — LOCATION DETAILED DESCRIPTION DERM: LOCATION DETAILED: LEFT SUPERIOR MEDIAL FOREHEAD

## 2023-10-11 ASSESSMENT — LOCATION ZONE DERM: LOCATION ZONE: FACE

## 2023-10-11 NOTE — PROCEDURE: SUTURE REMOVAL (GLOBAL PERIOD)
Detail Level: Detailed
Add 92253 Cpt? (Important Note: In 2017 The Use Of 64467 Is Being Tracked By Cms To Determine Future Global Period Reimbursement For Global Periods): yes

## 2023-10-13 ENCOUNTER — APPOINTMENT (RX ONLY)
Dept: URBAN - METROPOLITAN AREA CLINIC 148 | Facility: CLINIC | Age: 88
Setting detail: DERMATOLOGY
End: 2023-10-13

## 2023-10-13 DIAGNOSIS — Z48.817 ENCOUNTER FOR SURGICAL AFTERCARE FOLLOWING SURGERY ON THE SKIN AND SUBCUTANEOUS TISSUE: ICD-10-CM

## 2023-10-13 PROCEDURE — ? POST-OP WOUND EVALUATION

## 2023-10-13 ASSESSMENT — LOCATION DETAILED DESCRIPTION DERM: LOCATION DETAILED: LEFT SUPERIOR MEDIAL FOREHEAD

## 2023-10-13 ASSESSMENT — LOCATION SIMPLE DESCRIPTION DERM: LOCATION SIMPLE: LEFT FOREHEAD

## 2023-10-13 ASSESSMENT — LOCATION ZONE DERM: LOCATION ZONE: FACE

## 2023-10-13 NOTE — PROCEDURE: POST-OP WOUND EVALUATION
Detail Level: Detailed
Add 03309 Cpt? (Important Note: In 2017 The Use Of 28968 Is Being Tracked By Cms To Determine Future Global Period Reimbursement For Global Periods): no
Wound Diameter In Cm(Optional): 0
Wound Crusting?: crusted
Wound Color?: pink
Follow Up Units (Optional): 2
Follow Up Time Frame (Optional): weeks

## 2023-10-27 ENCOUNTER — APPOINTMENT (RX ONLY)
Dept: URBAN - METROPOLITAN AREA CLINIC 148 | Facility: CLINIC | Age: 88
Setting detail: DERMATOLOGY
End: 2023-10-27

## 2023-10-27 DIAGNOSIS — Z48.817 ENCOUNTER FOR SURGICAL AFTERCARE FOLLOWING SURGERY ON THE SKIN AND SUBCUTANEOUS TISSUE: ICD-10-CM

## 2023-10-27 PROCEDURE — 99024 POSTOP FOLLOW-UP VISIT: CPT

## 2023-10-27 PROCEDURE — ? POST-OP WOUND EVALUATION

## 2023-10-27 ASSESSMENT — LOCATION ZONE DERM: LOCATION ZONE: FACE

## 2023-10-27 ASSESSMENT — LOCATION DETAILED DESCRIPTION DERM: LOCATION DETAILED: LEFT SUPERIOR MEDIAL FOREHEAD

## 2023-10-27 ASSESSMENT — LOCATION SIMPLE DESCRIPTION DERM: LOCATION SIMPLE: LEFT FOREHEAD

## 2023-10-27 NOTE — PROCEDURE: POST-OP WOUND EVALUATION
Detail Level: Detailed
Add 78218 Cpt? (Important Note: In 2017 The Use Of 36716 Is Being Tracked By Cms To Determine Future Global Period Reimbursement For Global Periods): yes
Wound Diameter In Cm(Optional): 0
Wound Crusting?: clean
Wound Color?: pink
Wound Granulation?: early

## 2023-11-08 ENCOUNTER — APPOINTMENT (RX ONLY)
Dept: URBAN - METROPOLITAN AREA CLINIC 148 | Facility: CLINIC | Age: 88
Setting detail: DERMATOLOGY
End: 2023-11-08

## 2023-11-08 DIAGNOSIS — L57.0 ACTINIC KERATOSIS: ICD-10-CM

## 2023-11-08 DIAGNOSIS — Z48.817 ENCOUNTER FOR SURGICAL AFTERCARE FOLLOWING SURGERY ON THE SKIN AND SUBCUTANEOUS TISSUE: ICD-10-CM

## 2023-11-08 PROCEDURE — ? POST-OP WOUND EVALUATION

## 2023-11-08 PROCEDURE — 99024 POSTOP FOLLOW-UP VISIT: CPT

## 2023-11-08 PROCEDURE — ? LIQUID NITROGEN

## 2023-11-08 PROCEDURE — 17000 DESTRUCT PREMALG LESION: CPT | Mod: 79

## 2023-11-08 PROCEDURE — ? COUNSELING

## 2023-11-08 ASSESSMENT — LOCATION ZONE DERM: LOCATION ZONE: FACE

## 2023-11-08 ASSESSMENT — LOCATION SIMPLE DESCRIPTION DERM
LOCATION SIMPLE: LEFT FOREHEAD
LOCATION SIMPLE: LEFT CHEEK

## 2023-11-08 ASSESSMENT — LOCATION DETAILED DESCRIPTION DERM
LOCATION DETAILED: LEFT LATERAL MALAR CHEEK
LOCATION DETAILED: LEFT SUPERIOR MEDIAL FOREHEAD

## 2023-11-08 NOTE — PROCEDURE: LIQUID NITROGEN
Render Post-Care Instructions In Note?: yes
Number Of Freeze-Thaw Cycles: 2 freeze-thaw cycles
Detail Level: Detailed
Render Note In Bullet Format When Appropriate: No
Consent: The patient's consent was obtained including but not limited to risks of crusting, scabbing, blistering, scarring, darker or lighter pigmentary change, recurrence, incomplete removal and infection.
Post-Care Instructions: I reviewed with the patient in detail post-care instructions. Patient is to wear sunprotection, and avoid picking at any of the treated lesions. Pt may apply Vaseline to crusted or scabbing areas.
Duration Of Freeze Thaw-Cycle (Seconds): 0

## 2023-11-08 NOTE — PROCEDURE: POST-OP WOUND EVALUATION
Detail Level: Detailed
Add 89011 Cpt? (Important Note: In 2017 The Use Of 16974 Is Being Tracked By Cms To Determine Future Global Period Reimbursement For Global Periods): yes
Wound Diameter In Cm(Optional): 0
Wound Crusting?: clean
Wound Color?: pink
Wound Granulation?: early

## 2023-11-16 ENCOUNTER — COMPREHENSIVE EXAM (OUTPATIENT)
Dept: URBAN - METROPOLITAN AREA CLINIC 26 | Facility: CLINIC | Age: 88
End: 2023-11-16

## 2023-11-16 DIAGNOSIS — H04.123: ICD-10-CM

## 2023-11-16 DIAGNOSIS — H43.813: ICD-10-CM

## 2023-11-16 DIAGNOSIS — H35.3231: ICD-10-CM

## 2023-11-16 DIAGNOSIS — E11.9: ICD-10-CM

## 2023-11-16 DIAGNOSIS — H35.042: ICD-10-CM

## 2023-11-16 DIAGNOSIS — H35.62: ICD-10-CM

## 2023-11-16 DIAGNOSIS — H34.211: ICD-10-CM

## 2023-11-16 DIAGNOSIS — H34.8320: ICD-10-CM

## 2023-11-16 PROCEDURE — 92014 COMPRE OPH EXAM EST PT 1/>: CPT

## 2023-11-16 PROCEDURE — 92134 CPTRZ OPH DX IMG PST SGM RTA: CPT

## 2023-11-16 PROCEDURE — 92250 FUNDUS PHOTOGRAPHY W/I&R: CPT

## 2023-11-16 PROCEDURE — 6702850 BILATERAL INTRAVITREAL INJECTION

## 2023-11-16 PROCEDURE — 92235 FLUORESCEIN ANGRPH MLTIFRAME: CPT

## 2023-11-16 PROCEDURE — J2777S SAMPLE VABYSMO

## 2023-11-16 ASSESSMENT — TONOMETRY
OD_IOP_MMHG: 12
OS_IOP_MMHG: 13

## 2023-11-16 ASSESSMENT — VISUAL ACUITY
OS_SC: 20/100-1
OD_SC: 20/30-2

## 2023-12-18 ENCOUNTER — APPOINTMENT (RX ONLY)
Dept: URBAN - METROPOLITAN AREA CLINIC 148 | Facility: CLINIC | Age: 88
Setting detail: DERMATOLOGY
End: 2023-12-18

## 2023-12-18 DIAGNOSIS — F42.4 EXCORIATION (SKIN-PICKING) DISORDER: ICD-10-CM

## 2023-12-18 DIAGNOSIS — L57.0 ACTINIC KERATOSIS: ICD-10-CM

## 2023-12-18 DIAGNOSIS — D485 NEOPLASM OF UNCERTAIN BEHAVIOR OF SKIN: ICD-10-CM

## 2023-12-18 PROBLEM — D48.5 NEOPLASM OF UNCERTAIN BEHAVIOR OF SKIN: Status: ACTIVE | Noted: 2023-12-18

## 2023-12-18 PROCEDURE — 11102 TANGNTL BX SKIN SINGLE LES: CPT | Mod: 79

## 2023-12-18 PROCEDURE — ? COUNSELING

## 2023-12-18 PROCEDURE — ? DEFER

## 2023-12-18 PROCEDURE — ? PRESCRIPTION MEDICATION MANAGEMENT

## 2023-12-18 PROCEDURE — 99213 OFFICE O/P EST LOW 20 MIN: CPT | Mod: 24,25

## 2023-12-18 PROCEDURE — ? BIOPSY BY SHAVE METHOD

## 2023-12-18 ASSESSMENT — LOCATION ZONE DERM
LOCATION ZONE: FACE
LOCATION ZONE: SCALP

## 2023-12-18 ASSESSMENT — LOCATION SIMPLE DESCRIPTION DERM
LOCATION SIMPLE: LEFT CHEEK
LOCATION SIMPLE: SCALP
LOCATION SIMPLE: LEFT SCALP
LOCATION SIMPLE: RIGHT SCALP

## 2023-12-18 ASSESSMENT — LOCATION DETAILED DESCRIPTION DERM
LOCATION DETAILED: LEFT SUPERIOR PARIETAL SCALP
LOCATION DETAILED: LEFT MEDIAL FRONTAL SCALP
LOCATION DETAILED: RIGHT MEDIAL FRONTAL SCALP
LOCATION DETAILED: LEFT LATERAL MALAR CHEEK
LOCATION DETAILED: LEFT CENTRAL FRONTAL SCALP

## 2023-12-18 NOTE — PROCEDURE: DEFER
Procedure To Be Performed At Next Visit: Prescription
Size Of Lesion In Cm (Optional): 0
Introduction Text (Please End With A Colon): The following procedure was deferred:
Detail Level: Detailed
Reason To Defer Override: excoriation on scalp
Other Procedure: topical 5FU

## 2023-12-18 NOTE — PROCEDURE: PRESCRIPTION MEDICATION MANAGEMENT
Render In Strict Bullet Format?: No
Detail Level: Simple
Initiate Treatment: Gentamicin daily with bandage (patient has medication at home)
Plan: Patient instructed to refrain from scratching area, plan is to evaluate excoriation at follow up and if healed discussed field treating AKs on scalp with topical 5FU. Patient verbalizes understanding and agrees with plan of care.

## 2023-12-18 NOTE — HPI: SECONDARY COMPLAINT
How Severe Is This Condition?: mild
Additional History: Patient reports scalp lesions are itching causing him to continuously scratch his scalp.

## 2024-01-04 ENCOUNTER — COMPREHENSIVE EXAM (OUTPATIENT)
Dept: URBAN - METROPOLITAN AREA CLINIC 26 | Facility: CLINIC | Age: 89
End: 2024-01-04

## 2024-01-04 DIAGNOSIS — H35.3231: ICD-10-CM

## 2024-01-04 DIAGNOSIS — H34.8320: ICD-10-CM

## 2024-01-04 PROCEDURE — 67028 INJECTION EYE DRUG: CPT

## 2024-01-04 PROCEDURE — 92134 CPTRZ OPH DX IMG PST SGM RTA: CPT

## 2024-01-04 PROCEDURE — J3490AVA AVASTIN *

## 2024-01-04 PROCEDURE — 92250 FUNDUS PHOTOGRAPHY W/I&R: CPT

## 2024-01-04 ASSESSMENT — VISUAL ACUITY
OS_SC: 20/100+1
OD_SC: 20/30-2

## 2024-01-04 ASSESSMENT — TONOMETRY
OD_IOP_MMHG: 18
OS_IOP_MMHG: 19

## 2024-01-16 ENCOUNTER — APPOINTMENT (RX ONLY)
Dept: URBAN - METROPOLITAN AREA CLINIC 148 | Facility: CLINIC | Age: 89
Setting detail: DERMATOLOGY
End: 2024-01-16

## 2024-01-16 PROBLEM — C44.329 SQUAMOUS CELL CARCINOMA OF SKIN OF OTHER PARTS OF FACE: Status: ACTIVE | Noted: 2024-01-16

## 2024-01-16 PROCEDURE — 99213 OFFICE O/P EST LOW 20 MIN: CPT

## 2024-01-16 PROCEDURE — ? DEFER

## 2024-01-16 PROCEDURE — ? PATHOLOGY DISCUSSION

## 2024-01-16 PROCEDURE — ? COUNSELING

## 2024-01-16 PROCEDURE — ? ADDITIONAL NOTES

## 2024-01-16 NOTE — PROCEDURE: DEFER
Introduction Text (Please End With A Colon): Procedure to defer today:
Detail Level: Detailed
Size Of Lesion In Cm (Optional): 0
Procedure To Be Performed At Next Visit: Mohs surgery

## 2024-01-16 NOTE — PROCEDURE: ADDITIONAL NOTES
Render Risk Assessment In Note?: yes
Additional Notes: Patient has gentamicin ointment at home. Advised patient to use daily and keep cleaned and covered.
Detail Level: Detailed

## 2024-01-31 ENCOUNTER — APPOINTMENT (RX ONLY)
Dept: URBAN - METROPOLITAN AREA CLINIC 148 | Facility: CLINIC | Age: 89
Setting detail: DERMATOLOGY
End: 2024-01-31

## 2024-01-31 PROBLEM — C44.329 SQUAMOUS CELL CARCINOMA OF SKIN OF OTHER PARTS OF FACE: Status: ACTIVE | Noted: 2024-01-31

## 2024-01-31 PROCEDURE — 13132 CMPLX RPR F/C/C/M/N/AX/G/H/F: CPT

## 2024-01-31 PROCEDURE — 17311 MOHS 1 STAGE H/N/HF/G: CPT

## 2024-01-31 PROCEDURE — ? MOHS SURGERY

## 2024-01-31 PROCEDURE — 17312 MOHS ADDL STAGE: CPT

## 2024-01-31 NOTE — PROCEDURE: MOHS SURGERY
Mohs Case Number: [id number]
Date Of Previous Biopsy (Optional): 12/18/23
Previous Accession (Optional): [id number]
Biopsy Photograph Reviewed: Yes
Consent Type: Consent 1 (Standard)
Eye Shield Used: No
Initial Size Of Lesion: 2.4
X Size Of Lesion In Cm (Optional): 0
Number Of Stages: 1
Repair Type: Complex Repair
Which Eyelid Repair Cpt Are You Using?: 97171
Oculoplastic Surgeon Procedure Text (A): After obtaining clear surgical margins the patient was sent to oculoplastics for surgical repair.  The patient understands they will receive post-surgical care and follow-up from the referring physician's office.
Otolaryngologist Procedure Text (A): After obtaining clear surgical margins the patient was sent to otolaryngology for surgical repair.  The patient understands they will receive post-surgical care and follow-up from the referring physician's office.
Plastic Surgeon Procedure Text (A): After obtaining clear surgical margins the patient was sent to plastics for surgical repair.  The patient understands they will receive post-surgical care and follow-up from the referring physician's office.
Mid-Level Procedure Text (A): After obtaining clear surgical margins the patient was sent to a mid-level provider for surgical repair.  The patient understands they will receive post-surgical care and follow-up from the mid-level provider.
Provider Procedure Text (A): After obtaining clear surgical margins the defect was repaired by another provider.
Asc Procedure Text (A): After obtaining clear surgical margins the patient was sent to an ASC for surgical repair.  The patient understands they will receive post-surgical care and follow-up from the ASC physician.
Simple / Intermediate / Complex Repair - Final Wound Length In Cm: 4.4
Suturegard Retention Suture: 2-0 Nylon
Retention Suture Bite Size: 3 mm
Length To Time In Minutes Device Was In Place: 10
Undermining Type: Entire Wound
Debridement Text: The wound edges were debrided prior to proceeding with the closure to facilitate wound healing.
Helical Rim Text: The closure involved the helical rim.
Vermilion Border Text: The closure involved the vermilion border.
Nostril Rim Text: The closure involved the nostril rim.
Retention Suture Text: Retention sutures were placed to support the closure and prevent dehiscence.
Location Indication Override (Is Already Calculated Based On Selected Body Location): Area M
Area H Indication Text: Tumors in this location are included in Area H (eyelids, eyebrows, nose, lips, chin, ear, pre-auricular, post-auricular, temple, genitalia, hands, feet, ankles and areola).  Tissue conservation is critical in these anatomic locations.
Area M Indication Text: Tumors in this location are included in Area M (cheek, forehead, scalp, neck, jawline and pretibial skin).  Mohs surgery is indicated for tumors in these anatomic locations.
Area L Indication Text: Tumors in this location are included in Area L (trunk and extremities).  Mohs surgery is indicated for larger tumors, or tumors with aggressive histologic features, in these anatomic locations.
Perineural Invasion (For Histology - Be Specific If Possible): absent
Special Stains Stage 1 - Results: Base On Clearance Noted Above
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
Stage 2: Additional Anesthesia Type: 1% lidocaine with epinephrine
Staging Info: By selecting yes to the question above you will include information on AJCC 8 tumor staging in your Mohs note. Information on tumor staging will be automatically added for SCCs on the head and neck. AJCC 8 includes tumor size, tumor depth, perineural involvement and bone invasion.
Tumor Depth: Less than 6mm from granular layer and no invasion beyond the subcutaneous fat
Was The Patient On Physician Recommended Anticoagulation Therapy?: Please Select the Appropriate Response
Medical Necessity Statement: Based on my medical judgement, Mohs surgery is the most appropriate treatment for this cancer compared to other treatments.
Alternatives Discussed Intro (Do Not Add Period): I discussed alternative treatments to Mohs surgery and specifically discussed the risks and benefits of
Consent 1/Introductory Paragraph: The rationale for Mohs was explained to the patient and consent was obtained. The risks, benefits and alternatives to therapy were discussed in detail. Specifically, the risks of infection, scarring, bleeding, prolonged wound healing, incomplete removal, allergy to anesthesia, nerve injury and recurrence were addressed. Prior to the procedure, the treatment site was clearly identified and confirmed by the patient. All components of Universal Protocol/PAUSE Rule completed.
Consent 2/Introductory Paragraph: Mohs surgery was explained to the patient and consent was obtained. The risks, benefits and alternatives to therapy were discussed in detail. Specifically, the risks of infection, scarring, bleeding, prolonged wound healing, incomplete removal, allergy to anesthesia, nerve injury and recurrence were addressed. Prior to the procedure, the treatment site was clearly identified and confirmed by the patient. All components of Universal Protocol/PAUSE Rule completed.
Consent 3/Introductory Paragraph: I gave the patient a chance to ask questions they had about the procedure.  Following this I explained the Mohs procedure and consent was obtained. The risks, benefits and alternatives to therapy were discussed in detail. Specifically, the risks of infection, scarring, bleeding, prolonged wound healing, incomplete removal, allergy to anesthesia, nerve injury and recurrence were addressed. Prior to the procedure, the treatment site was clearly identified and confirmed by the patient. All components of Universal Protocol/PAUSE Rule completed.
Consent (Temporal Branch)/Introductory Paragraph: The rationale for Mohs was explained to the patient and consent was obtained. The risks, benefits and alternatives to therapy were discussed in detail. Specifically, the risks of damage to the temporal branch of the facial nerve, infection, scarring, bleeding, prolonged wound healing, incomplete removal, allergy to anesthesia, and recurrence were addressed. Prior to the procedure, the treatment site was clearly identified and confirmed by the patient. All components of Universal Protocol/PAUSE Rule completed.
Consent (Marginal Mandibular)/Introductory Paragraph: The rationale for Mohs was explained to the patient and consent was obtained. The risks, benefits and alternatives to therapy were discussed in detail. Specifically, the risks of damage to the marginal mandibular branch of the facial nerve, infection, scarring, bleeding, prolonged wound healing, incomplete removal, allergy to anesthesia, and recurrence were addressed. Prior to the procedure, the treatment site was clearly identified and confirmed by the patient. All components of Universal Protocol/PAUSE Rule completed.
Consent (Spinal Accessory)/Introductory Paragraph: The rationale for Mohs was explained to the patient and consent was obtained. The risks, benefits and alternatives to therapy were discussed in detail. Specifically, the risks of damage to the spinal accessory nerve, infection, scarring, bleeding, prolonged wound healing, incomplete removal, allergy to anesthesia, and recurrence were addressed. Prior to the procedure, the treatment site was clearly identified and confirmed by the patient. All components of Universal Protocol/PAUSE Rule completed.
Consent (Near Eyelid Margin)/Introductory Paragraph: The rationale for Mohs was explained to the patient and consent was obtained. The risks, benefits and alternatives to therapy were discussed in detail. Specifically, the risks of ectropion or eyelid deformity, infection, scarring, bleeding, prolonged wound healing, incomplete removal, allergy to anesthesia, nerve injury and recurrence were addressed. Prior to the procedure, the treatment site was clearly identified and confirmed by the patient. All components of Universal Protocol/PAUSE Rule completed.
Consent (Ear)/Introductory Paragraph: The rationale for Mohs was explained to the patient and consent was obtained. The risks, benefits and alternatives to therapy were discussed in detail. Specifically, the risks of ear deformity, infection, scarring, bleeding, prolonged wound healing, incomplete removal, allergy to anesthesia, nerve injury and recurrence were addressed. Prior to the procedure, the treatment site was clearly identified and confirmed by the patient. All components of Universal Protocol/PAUSE Rule completed.
Consent (Nose)/Introductory Paragraph: The rationale for Mohs was explained to the patient and consent was obtained. The risks, benefits and alternatives to therapy were discussed in detail. Specifically, the risks of nasal deformity, changes in the flow of air through the nose, infection, scarring, bleeding, prolonged wound healing, incomplete removal, allergy to anesthesia, nerve injury and recurrence were addressed. Prior to the procedure, the treatment site was clearly identified and confirmed by the patient. All components of Universal Protocol/PAUSE Rule completed.
Consent (Lip)/Introductory Paragraph: The rationale for Mohs was explained to the patient and consent was obtained. The risks, benefits and alternatives to therapy were discussed in detail. Specifically, the risks of lip deformity, changes in the oral aperture, infection, scarring, bleeding, prolonged wound healing, incomplete removal, allergy to anesthesia, nerve injury and recurrence were addressed. Prior to the procedure, the treatment site was clearly identified and confirmed by the patient. All components of Universal Protocol/PAUSE Rule completed.
Consent (Scalp)/Introductory Paragraph: The rationale for Mohs was explained to the patient and consent was obtained. The risks, benefits and alternatives to therapy were discussed in detail. Specifically, the risks of changes in hair growth pattern secondary to repair, infection, scarring, bleeding, prolonged wound healing, incomplete removal, allergy to anesthesia, nerve injury and recurrence were addressed. Prior to the procedure, the treatment site was clearly identified and confirmed by the patient. All components of Universal Protocol/PAUSE Rule completed.
Detail Level: Detailed
Postop Diagnosis: same
Anesthesia Volume In Cc: 6
Additional Anesthesia Volume In Cc: 3
Hemostasis: Electrocautery
Estimated Blood Loss (Cc): minimal
Brow Lift Text: A midfrontal incision was made medially to the defect to allow access to the tissues just superior to the left eyebrow. Following careful dissection inferiorly in a supraperiosteal plane to the level of the left eyebrow, several 3-0 monocryl sutures were used to resuspend the eyebrow orbicularis oculi muscular unit to the superior frontal bone periosteum. This resulted in an appropriate reapproximation of static eyebrow symmetry and correction of the left brow ptosis.
Deep Sutures: 5-0 Monocryl
Epidermal Closure: running locked
Suturegard Intro: Intraoperative tissue expansion was performed, utilizing the SUTUREGARD device, in order to reduce wound tension.
Suturegard Body: The suture ends were repeatedly re-tightened and re-clamped to achieve the desired tissue expansion.
Hemigard Intro: Due to skin fragility and wound tension, it was decided to use HEMIGARD adhesive retention suture devices to permit a linear closure. The skin was cleaned and dried for a 6cm distance away from the wound. Excessive hair, if present, was removed to allow for adhesion.
Hemigard Postcare Instructions: The HEMIGARD strips are to remain completely dry for at least 5-7 days.
Donor Site Anesthesia Type: same as repair anesthesia
Epidermal Closure Graft Donor Site (Optional): simple interrupted
Graft Donor Site Bandage (Optional-Leave Blank If You Don't Want In Note): Pressure dressing applied to the donor site.
Closure 2 Information: This tab is for additional flaps and grafts, including complex repair and grafts and complex repair and flaps. You can also specify a different location for the additional defect, if the location is the same you do not need to select a new one. We will insert the automated text for the repair you select below just as we do for solitary flaps and grafts. Please note that at this time if you select a location with a different insurance zone you will need to override the ICD10 and CPT if appropriate.
Closure 3 Information: This tab is for additional flaps and grafts above and beyond our usual structured repairs.  Please note if you enter information here it will not currently bill and you will need to add the billing information manually.
Wound Care: Bacitracin
Dressing: telfa dressing
Dressing (No Sutures): dry sterile dressing
Suture Removal: 7 days
Unna Boot Text: An Unna boot was placed to help immobilize the limb and facilitate more rapid healing.
Home Suture Removal Text: Patient was provided instructions on removing sutures and will remove their sutures at home.  If they have any questions or difficulties they will call the office.
Post-Care Instructions: MOHS POST-OPERATIVE WOUND CARE\\n\\n1. Leave bandage on for the first 2 days.  Do not get the bandage wet (i.e., submerged in a bath, pool, or sweating).  Please limit activities while your stitches are in.  Stitches can break.  Skin can tear.  If you feel tension on the stitches you must stop that motion/activity.\\n\\n2. A shower may be taken after the first 2 days. Please take sponge baths during the first two days after your surgery, unless instructed otherwise.\\n\\n3. Carefully remove the bandage so as not to irritate the wound. \\n\\n4. Gently cleanse the area with soap and water.\\n\\n5. Apply a thin layer of Vaseline or Bacitracin ointment to the wound.  If another antibiotic has been prescribed, please apply as instructed on the prescription packaging. \\n\\n8. Cut a piece of non-stick gauze or Telfa pad to a size just large enough to cover the surgical site.\\n\\n9. Apply medical tape or hypoallergenic paper tape (for those with allergy to bandage adhesive) to hold your bandage in place.\\n\\n10.  The surgical site should be cleansed and dressed as described above on a daily basis until the day of   \\n   suture removal.  \\n\\n11.  If shaving around the area, leave your bandage on and shave around it.  Shaving over the sutures could \\n allow the wound to open up or increase your infection risk.\\n\\nDO NOT SWIM OR GO IN A HOT TUB UNTIL SUTURES HAVE BEEN REMOVED.\\n \\nADDITIONAL INSTRUCTIONS:\\n\\n1. Bruising and swelling may occur after the surgery and can increase for up to 48 hours.  Do not be alarmed--this will resolve over time.  Applying ice compresses over the bandage will help to decrease swelling and bruising:   ICE: Wrap a clean hand-towel or handkerchief around your ice pack.\\n Apply over the bandage for 15 minutes on and off for 2 hours to help reduce swelling.\\n\\n2. If slight bleeding occurs, apply continuous pressure for 20 minutes.  If bleeding persists or is severe, call the office.  If the office is closed, call 911 or go to the nearest emergency room.\\n\\n3. If the wound becomes hot or extremely tender to touch or it is bright red around the wound, extending 1 inch beyond the wound edge, please contact us at [phone].  If after hours, call [phone].\\n\\n4. Some oozing from the wound is normal.\\n\\n5. If healing without sutures:  Signs of healing may not be apparent for 2-3 weeks and the wound may take 6-8 weeks or longer to completely heal.  Clean the area with soap and water daily. Apply Vaseline daily or prescription ointment as directed.
Pain Refusal Text: I offered to prescribe pain medication but the patient refused to take this medication.
Mauc Instructions: By selecting yes to the question below the MAUC number will be added into the note.  This will be calculated automatically based on the diagnosis chosen, the size entered, the body zone selected (H,M,L) and the specific indications you chose. You will also have the option to override the Mohs AUC if you disagree with the automatically calculated number and this option is found in the Case Summary tab.
Where Do You Want The Question To Include Opioid Counseling Located?: Case Summary Tab
Eye Protection Verbiage: Before proceeding with the stage, a plastic scleral shield was inserted. The globe was anesthetized with a few drops of 1% lidocaine with 1:100,000 epinephrine. Then, an appropriate sized scleral shield was chosen and coated with lacrilube ointment. The shield was gently inserted and left in place for the duration of each stage. After the stage was completed, the shield was gently removed.
Mohs Method Verbiage: An incision at a 45 degree angle following the standard Mohs approach was done and the specimen was harvested as a microscopic controlled layer.
Surgeon/Pathologist Verbiage (Will Incorporate Name Of Surgeon From Intro If Not Blank): operated in two distinct and integrated capacities as the surgeon and pathologist.
Mohs Histo Method Verbiage: Each section was then chromacoded and processed in the Mohs lab using the Mohs protocol and submitted for frozen section.
Subsequent Stages Histo Method Verbiage: Using a similar technique to that described above, a thin layer of tissue was removed from all areas where tumor was visible on the previous stage.  The tissue was again oriented, mapped, dyed, and processed as above.
Mohs Rapid Report Verbiage: The area of clinically evident tumor was marked with skin marking ink and appropriately hatched.  The initial incision was made following the Mohs approach through the skin.  The specimen was taken to the lab, divided into the necessary number of pieces, chromacoded and processed according to the Mohs protocol.  This was repeated in successive stages until a tumor free defect was achieved.
Complex Repair Preamble Text (Leave Blank If You Do Not Want): Extensive wide undermining was performed.
Intermediate Repair Preamble Text (Leave Blank If You Do Not Want): Undermining was performed with blunt dissection.
Non-Graft Cartilage Fenestration Text: The cartilage was fenestrated with a 2mm punch biopsy to help facilitate healing.
Graft Cartilage Fenestration Text: The cartilage was fenestrated with a 2mm punch biopsy to help facilitate graft survival and healing.
Secondary Intention Text (Leave Blank If You Do Not Want): The defect will heal with secondary intention.
No Repair - Repaired With Adjacent Surgical Defect Text (Leave Blank If You Do Not Want): After obtaining clear surgical margins the defect was repaired concurrently with another surgical defect which was in close approximation.
Adjacent Tissue Transfer Text: The defect edges were debeveled with a #15 scalpel blade. Given the location of the defect and the proximity to free margins an adjacent tissue transfer was deemed most appropriate. Using a sterile surgical marker, an appropriate flap was drawn incorporating the defect and placing the expected incisions within the relaxed skin tension lines where possible. The area thus outlined was incised deep to adipose tissue with a #15 scalpel blade. The skin margins were undermined to an appropriate distance in all directions utilizing iris scissors and carried over to close the primary defect.
Advancement Flap (Single) Text: The defect edges were debeveled with a #15 scalpel blade.  Given the location of the defect and the proximity to free margins a single advancement flap was deemed most appropriate.  Using a sterile surgical marker, an appropriate advancement flap was drawn incorporating the defect and placing the expected incisions within the relaxed skin tension lines where possible.    The area thus outlined was incised deep to adipose tissue with a #15 scalpel blade.  The skin margins were undermined to an appropriate distance in all directions utilizing iris scissors.
Advancement Flap (Double) Text: The defect edges were debeveled with a #15 scalpel blade.  Given the location of the defect and the proximity to free margins a double advancement flap was deemed most appropriate.  Using a sterile surgical marker, the appropriate advancement flaps were drawn incorporating the defect and placing the expected incisions within the relaxed skin tension lines where possible.    The area thus outlined was incised deep to adipose tissue with a #15 scalpel blade.  The skin margins were undermined to an appropriate distance in all directions utilizing iris scissors.
Burow's Advancement Flap Text: The defect edges were debeveled with a #15 scalpel blade.  Given the location of the defect and the proximity to free margins a Burow's advancement flap was deemed most appropriate.  Using a sterile surgical marker, the appropriate advancement flap was drawn incorporating the defect and placing the expected incisions within the relaxed skin tension lines where possible.    The area thus outlined was incised deep to adipose tissue with a #15 scalpel blade.  The skin margins were undermined to an appropriate distance in all directions utilizing iris scissors.
Chonodrocutaneous Helical Advancement Flap Text: The defect edges were debeveled with a #15 scalpel blade. Given the location of the defect and the proximity to free margins a chondrocutaneous helical advancement flap was deemed most appropriate. Using a sterile surgical marker, the appropriate advancement flap was drawn incorporating the defect and placing the expected incisions within the relaxed skin tension lines where possible. The area thus outlined was incised deep to adipose tissue with a #15 scalpel blade. The skin margins were undermined to an appropriate distance in all directions utilizing iris scissors. Following this, the designed flap was advanced and carried over into the primary defect and sutured into place.
Crescentic Advancement Flap Text: The defect edges were debeveled with a #15 scalpel blade.  Given the location of the defect and the proximity to free margins a crescentic advancement flap was deemed most appropriate.  Using a sterile surgical marker, the appropriate advancement flap was drawn incorporating the defect and placing the expected incisions within the relaxed skin tension lines where possible.    The area thus outlined was incised deep to adipose tissue with a #15 scalpel blade.  The skin margins were undermined to an appropriate distance in all directions utilizing iris scissors.
A-T Advancement Flap Text: The defect edges were debeveled with a #15 scalpel blade.  Given the location of the defect, shape of the defect and the proximity to free margins an A-T advancement flap was deemed most appropriate.  Using a sterile surgical marker, an appropriate advancement flap was drawn incorporating the defect and placing the expected incisions within the relaxed skin tension lines where possible.    The area thus outlined was incised deep to adipose tissue with a #15 scalpel blade.  The skin margins were undermined to an appropriate distance in all directions utilizing iris scissors.
O-T Advancement Flap Text: The defect edges were debeveled with a #15 scalpel blade.  Given the location of the defect, shape of the defect and the proximity to free margins an O-T advancement flap was deemed most appropriate.  Using a sterile surgical marker, an appropriate advancement flap was drawn incorporating the defect and placing the expected incisions within the relaxed skin tension lines where possible.    The area thus outlined was incised deep to adipose tissue with a #15 scalpel blade.  The skin margins were undermined to an appropriate distance in all directions utilizing iris scissors.
O-L Flap Text: The defect edges were debeveled with a #15 scalpel blade.  Given the location of the defect, shape of the defect and the proximity to free margins an O-L flap was deemed most appropriate.  Using a sterile surgical marker, an appropriate advancement flap was drawn incorporating the defect and placing the expected incisions within the relaxed skin tension lines where possible.    The area thus outlined was incised deep to adipose tissue with a #15 scalpel blade.  The skin margins were undermined to an appropriate distance in all directions utilizing iris scissors.
O-Z Flap Text: The defect edges were debeveled with a #15 scalpel blade. Given the location of the defect, shape of the defect and the proximity to free margins an O-Z flap was deemed most appropriate. Using a sterile surgical marker, an appropriate transposition flap was drawn incorporating the defect and placing the expected incisions within the relaxed skin tension lines where possible. The area thus outlined was incised deep to adipose tissue with a #15 scalpel blade. The skin margins were undermined to an appropriate distance in all directions utilizing iris scissors. Following this, the designed flap was carried over into the primary defect and sutured into place.
Double O-Z Flap Text: The defect edges were debeveled with a #15 scalpel blade. Given the location of the defect, shape of the defect and the proximity to free margins a Double O-Z flap was deemed most appropriate. Using a sterile surgical marker, an appropriate transposition flap was drawn incorporating the defect and placing the expected incisions within the relaxed skin tension lines where possible. The area thus outlined was incised deep to adipose tissue with a #15 scalpel blade. The skin margins were undermined to an appropriate distance in all directions utilizing iris scissors. Following this, the designed flap was carried over into the primary defect and sutured into place.
V-Y Flap Text: The defect edges were debeveled with a #15 scalpel blade.  Given the location of the defect, shape of the defect and the proximity to free margins a V-Y flap was deemed most appropriate.  Using a sterile surgical marker, an appropriate advancement flap was drawn incorporating the defect and placing the expected incisions within the relaxed skin tension lines where possible.    The area thus outlined was incised deep to adipose tissue with a #15 scalpel blade.  The skin margins were undermined to an appropriate distance in all directions utilizing iris scissors.
Advancement-Rotation Flap Text: The defect edges were debeveled with a #15 scalpel blade.  Given the location of the defect, shape of the defect and the proximity to free margins an advancement-rotation flap was deemed most appropriate.  Using a sterile surgical marker, an appropriate flap was drawn incorporating the defect and placing the expected incisions within the relaxed skin tension lines where possible. The area thus outlined was incised deep to adipose tissue with a #15 scalpel blade.  The skin margins were undermined to an appropriate distance in all directions utilizing iris scissors.
Mercedes Flap Text: The defect edges were debeveled with a #15 scalpel blade. Given the location of the defect, shape of the defect and the proximity to free margins a Mercedes flap was deemed most appropriate. Using a sterile surgical marker, an appropriate advancement flap was drawn incorporating the defect and placing the expected incisions within the relaxed skin tension lines where possible. The area thus outlined was incised deep to adipose tissue with a #15 scalpel blade. The skin margins were undermined to an appropriate distance in all directions utilizing iris scissors. Following this, the designed flap was advanced and carried over into the primary defect and sutured into place.
Modified Advancement Flap Text: The defect edges were debeveled with a #15 scalpel blade.  Given the location of the defect, shape of the defect and the proximity to free margins a modified advancement flap was deemed most appropriate.  Using a sterile surgical marker, an appropriate advancement flap was drawn incorporating the defect and placing the expected incisions within the relaxed skin tension lines where possible.    The area thus outlined was incised deep to adipose tissue with a #15 scalpel blade.  The skin margins were undermined to an appropriate distance in all directions utilizing iris scissors.
Mucosal Advancement Flap Text: Given the location of the defect, shape of the defect and the proximity to free margins a mucosal advancement flap was deemed most appropriate. Incisions were made with a 15 blade scalpel in the appropriate fashion along the cutaneous vermilion border and the mucosal lip. The remaining actinically damaged mucosal tissue was excised.  The mucosal advancement flap was then elevated to the gingival sulcus with care taken to preserve the neurovascular structures and advanced into the primary defect. Care was taken to ensure that precise realignment of the vermilion border was achieved.
Peng Advancement Flap Text: The defect edges were debeveled with a #15 scalpel blade. Given the location of the defect, shape of the defect and the proximity to free margins a Peng advancement flap was deemed most appropriate. Using a sterile surgical marker, an appropriate advancement flap was drawn incorporating the defect and placing the expected incisions within the relaxed skin tension lines where possible. The area thus outlined was incised deep to adipose tissue with a #15 scalpel blade. The skin margins were undermined to an appropriate distance in all directions utilizing iris scissors. Following this, the designed flap was advanced and carried over into the primary defect and sutured into place.
Hatchet Flap Text: The defect edges were debeveled with a #15 scalpel blade.  Given the location of the defect, shape of the defect and the proximity to free margins a hatchet flap was deemed most appropriate.  Using a sterile surgical marker, an appropriate hatchet flap was drawn incorporating the defect and placing the expected incisions within the relaxed skin tension lines where possible.    The area thus outlined was incised deep to adipose tissue with a #15 scalpel blade.  The skin margins were undermined to an appropriate distance in all directions utilizing iris scissors.
Rotation Flap Text: The defect edges were debeveled with a #15 scalpel blade.  Given the location of the defect, shape of the defect and the proximity to free margins a rotation flap was deemed most appropriate.  Using a sterile surgical marker, an appropriate rotation flap was drawn incorporating the defect and placing the expected incisions within the relaxed skin tension lines where possible.    The area thus outlined was incised deep to adipose tissue with a #15 scalpel blade.  The skin margins were undermined to an appropriate distance in all directions utilizing iris scissors.
Bilateral Rotation Flap Text: The defect edges were debeveled with a #15 scalpel blade. Given the location of the defect, shape of the defect and the proximity to free margins a bilateral rotation flap was deemed most appropriate. Using a sterile surgical marker, an appropriate rotation flap was drawn incorporating the defect and placing the expected incisions within the relaxed skin tension lines where possible. The area thus outlined was incised deep to adipose tissue with a #15 scalpel blade. The skin margins were undermined to an appropriate distance in all directions utilizing iris scissors. Following this, the designed flap was carried over into the primary defect and sutured into place.
Spiral Flap Text: The defect edges were debeveled with a #15 scalpel blade.  Given the location of the defect, shape of the defect and the proximity to free margins a spiral flap was deemed most appropriate.  Using a sterile surgical marker, an appropriate rotation flap was drawn incorporating the defect and placing the expected incisions within the relaxed skin tension lines where possible. The area thus outlined was incised deep to adipose tissue with a #15 scalpel blade.  The skin margins were undermined to an appropriate distance in all directions utilizing iris scissors.
Staged Advancement Flap Text: The defect edges were debeveled with a #15 scalpel blade. Given the location of the defect, shape of the defect and the proximity to free margins a staged advancement flap was deemed most appropriate. Using a sterile surgical marker, an appropriate advancement flap was drawn incorporating the defect and placing the expected incisions within the relaxed skin tension lines where possible. The area thus outlined was incised deep to adipose tissue with a #15 scalpel blade. The skin margins were undermined to an appropriate distance in all directions utilizing iris scissors. Following this, the designed flap was carried over into the primary defect and sutured into place.
Star Wedge Flap Text: The defect edges were debeveled with a #15 scalpel blade.  Given the location of the defect, shape of the defect and the proximity to free margins a star wedge flap was deemed most appropriate.  Using a sterile surgical marker, an appropriate rotation flap was drawn incorporating the defect and placing the expected incisions within the relaxed skin tension lines where possible. The area thus outlined was incised deep to adipose tissue with a #15 scalpel blade.  The skin margins were undermined to an appropriate distance in all directions utilizing iris scissors.
Transposition Flap Text: The defect edges were debeveled with a #15 scalpel blade.  Given the location of the defect and the proximity to free margins a transposition flap was deemed most appropriate.  Using a sterile surgical marker, an appropriate transposition flap was drawn incorporating the defect.    The area thus outlined was incised deep to adipose tissue with a #15 scalpel blade.  The skin margins were undermined to an appropriate distance in all directions utilizing iris scissors.
Muscle Hinge Flap Text: The defect edges were debeveled with a #15 scalpel blade.  Given the size, depth and location of the defect and the proximity to free margins a muscle hinge flap was deemed most appropriate.  Using a sterile surgical marker, an appropriate hinge flap was drawn incorporating the defect. The area thus outlined was incised with a #15 scalpel blade.  The skin margins were undermined to an appropriate distance in all directions utilizing iris scissors.
Mustarde Flap Text: The defect edges were debeveled with a #15 scalpel blade.  Given the size, depth and location of the defect and the proximity to free margins a Mustarde flap was deemed most appropriate. Using a sterile surgical marker, an appropriate flap was drawn incorporating the defect. The area thus outlined was incised with a #15 scalpel blade. The skin margins were undermined to an appropriate distance in all directions utilizing iris scissors. Following this, the designed flap was carried into the primary defect and sutured into place.
Nasal Turnover Hinge Flap Text: The defect edges were debeveled with a #15 scalpel blade.  Given the size, depth, location of the defect and the defect being full thickness a nasal turnover hinge flap was deemed most appropriate. Using a sterile surgical marker, an appropriate hinge flap was drawn incorporating the defect. The area thus outlined was incised with a #15 scalpel blade. The flap was designed to recreate the nasal mucosal lining and the alar rim. The skin margins were undermined to an appropriate distance in all directions utilizing iris scissors. Following this, the designed flap was carried over into the primary defect and sutured into place
Nasalis-Muscle-Based Myocutaneous Island Pedicle Flap Text: Using a #15 blade, an incision was made around the donor flap to the level of the nasalis muscle. Wide lateral undermining was then performed in both the subcutaneous plane above the nasalis muscle, and in a submuscular plane just above periosteum. This allowed the formation of a free nasalis muscle axial pedicle (based on the angular artery) which was still attached to the actual cutaneous flap, increasing its mobility and vascular viability. Hemostasis was obtained with pinpoint electrocoagulation. The flap was mobilized into position and the pivotal anchor points positioned and stabilized with buried interrupted sutures. Subcutaneous and dermal tissues were closed in a multilayered fashion with sutures. Tissue redundancies were excised, and the epidermal edges were apposed without significant tension and sutured with sutures.
Orbicularis Oris Muscle Flap Text: The defect edges were debeveled with a #15 scalpel blade.  Given that the defect affected the competency of the oral sphincter an orbicularis oris muscle flap was deemed most appropriate to restore this competency and normal muscle function.  Using a sterile surgical marker, an appropriate flap was drawn incorporating the defect. The area thus outlined was incised with a #15 scalpel blade. Following this, the designed flap was carried over into the primary defect and sutured into place.
Melolabial Transposition Flap Text: The defect edges were debeveled with a #15 scalpel blade.  Given the location of the defect and the proximity to free margins a melolabial flap was deemed most appropriate.  Using a sterile surgical marker, an appropriate melolabial transposition flap was drawn incorporating the defect.    The area thus outlined was incised deep to adipose tissue with a #15 scalpel blade.  The skin margins were undermined to an appropriate distance in all directions utilizing iris scissors.
Rhombic Flap Text: The defect edges were debeveled with a #15 scalpel blade.  Given the location of the defect and the proximity to free margins a rhombic flap was deemed most appropriate.  Using a sterile surgical marker, an appropriate rhombic flap was drawn incorporating the defect.    The area thus outlined was incised deep to adipose tissue with a #15 scalpel blade.  The skin margins were undermined to an appropriate distance in all directions utilizing iris scissors.
Rhomboid Transposition Flap Text: The defect edges were debeveled with a #15 scalpel blade. Given the location of the defect and the proximity to free margins a rhomboid transposition flap was deemed most appropriate. Using a sterile surgical marker, an appropriate rhomboid flap was drawn incorporating the defect. The area thus outlined was incised deep to adipose tissue with a #15 scalpel blade. The skin margins were undermined to an appropriate distance in all directions utilizing iris scissors. Following this, the designed flap was carried over into the primary defect and sutured into place.
Bi-Rhombic Flap Text: The defect edges were debeveled with a #15 scalpel blade.  Given the location of the defect and the proximity to free margins a bi-rhombic flap was deemed most appropriate.  Using a sterile surgical marker, an appropriate rhombic flap was drawn incorporating the defect. The area thus outlined was incised deep to adipose tissue with a #15 scalpel blade.  The skin margins were undermined to an appropriate distance in all directions utilizing iris scissors.
Helical Rim Advancement Flap Text: The defect edges were debeveled with a #15 blade scalpel.  Given the location of the defect and the proximity to free margins (helical rim) a double helical rim advancement flap was deemed most appropriate.  Using a sterile surgical marker, the appropriate advancement flaps were drawn incorporating the defect and placing the expected incisions between the helical rim and antihelix where possible.  The area thus outlined was incised through and through with a #15 scalpel blade.  With a skin hook and iris scissors, the flaps were gently and sharply undermined and freed up.
Bilateral Helical Rim Advancement Flap Text: The defect edges were debeveled with a #15 blade scalpel.  Given the location of the defect and the proximity to free margins (helical rim) a bilateral helical rim advancement flap was deemed most appropriate.  Using a sterile surgical marker, the appropriate advancement flaps were drawn incorporating the defect and placing the expected incisions between the helical rim and antihelix where possible.  The area thus outlined was incised through and through with a #15 scalpel blade.  With a skin hook and iris scissors, the flaps were gently and sharply undermined and freed up.
Ear Star Wedge Flap Text: The defect edges were debeveled with a #15 blade scalpel.  Given the location of the defect and the proximity to free margins (helical rim) an ear star wedge flap was deemed most appropriate.  Using a sterile surgical marker, the appropriate flap was drawn incorporating the defect and placing the expected incisions between the helical rim and antihelix where possible.  The area thus outlined was incised through and through with a #15 scalpel blade.
Banner Transposition Flap Text: The defect edges were debeveled with a #15 scalpel blade. Given the location of the defect and the proximity to free margins a Banner transposition flap was deemed most appropriate. Using a sterile surgical marker, an appropriate flap was drawn around the defect. The area thus outlined was incised deep to adipose tissue with a #15 scalpel blade. The skin margins were undermined to an appropriate distance in all directions utilizing iris scissors. Following this, the designed flap was carried into the primary defect and sutured into place.
Bilobed Flap Text: The defect edges were debeveled with a #15 scalpel blade.  Given the location of the defect and the proximity to free margins a bilobe flap was deemed most appropriate.  Using a sterile surgical marker, an appropriate bilobe flap drawn around the defect.    The area thus outlined was incised deep to adipose tissue with a #15 scalpel blade.  The skin margins were undermined to an appropriate distance in all directions utilizing iris scissors.
Bilobed Transposition Flap Text: The defect edges were debeveled with a #15 scalpel blade.  Given the location of the defect and the proximity to free margins a bilobed transposition flap was deemed most appropriate.  Using a sterile surgical marker, an appropriate bilobe flap drawn around the defect.    The area thus outlined was incised deep to adipose tissue with a #15 scalpel blade.  The skin margins were undermined to an appropriate distance in all directions utilizing iris scissors.
Trilobed Flap Text: The defect edges were debeveled with a #15 scalpel blade.  Given the location of the defect and the proximity to free margins a trilobed flap was deemed most appropriate.  Using a sterile surgical marker, an appropriate trilobed flap drawn around the defect.    The area thus outlined was incised deep to adipose tissue with a #15 scalpel blade.  The skin margins were undermined to an appropriate distance in all directions utilizing iris scissors.
Dorsal Nasal Flap Text: The defect edges were debeveled with a #15 scalpel blade.  Given the location of the defect and the proximity to free margins a dorsal nasal flap was deemed most appropriate.  Using a sterile surgical marker, an appropriate dorsal nasal flap was drawn around the defect.    The area thus outlined was incised deep to adipose tissue with a #15 scalpel blade.  The skin margins were undermined to an appropriate distance in all directions utilizing iris scissors.
Island Pedicle Flap Text: The defect edges were debeveled with a #15 scalpel blade.  Given the location of the defect, shape of the defect and the proximity to free margins an island pedicle advancement flap was deemed most appropriate.  Using a sterile surgical marker, an appropriate advancement flap was drawn incorporating the defect, outlining the appropriate donor tissue and placing the expected incisions within the relaxed skin tension lines where possible.    The area thus outlined was incised deep to adipose tissue with a #15 scalpel blade.  The skin margins were undermined to an appropriate distance in all directions around the primary defect and laterally outward around the island pedicle utilizing iris scissors.  There was minimal undermining beneath the pedicle flap.
Island Pedicle Flap With Canthal Suspension Text: The defect edges were debeveled with a #15 scalpel blade.  Given the location of the defect, shape of the defect and the proximity to free margins an island pedicle advancement flap was deemed most appropriate.  Using a sterile surgical marker, an appropriate advancement flap was drawn incorporating the defect, outlining the appropriate donor tissue and placing the expected incisions within the relaxed skin tension lines where possible. The area thus outlined was incised deep to adipose tissue with a #15 scalpel blade.  The skin margins were undermined to an appropriate distance in all directions around the primary defect and laterally outward around the island pedicle utilizing iris scissors.  There was minimal undermining beneath the pedicle flap. A suspension suture was placed in the canthal tendon to prevent tension and prevent ectropion.
Alar Island Pedicle Flap Text: The defect edges were debeveled with a #15 scalpel blade.  Given the location of the defect, shape of the defect and the proximity to the alar rim an island pedicle advancement flap was deemed most appropriate.  Using a sterile surgical marker, an appropriate advancement flap was drawn incorporating the defect, outlining the appropriate donor tissue and placing the expected incisions within the nasal ala running parallel to the alar rim. The area thus outlined was incised with a #15 scalpel blade.  The skin margins were undermined minimally to an appropriate distance in all directions around the primary defect and laterally outward around the island pedicle utilizing iris scissors.  There was minimal undermining beneath the pedicle flap.
Double Island Pedicle Flap Text: The defect edges were debeveled with a #15 scalpel blade.  Given the location of the defect, shape of the defect and the proximity to free margins a double island pedicle advancement flap was deemed most appropriate.  Using a sterile surgical marker, an appropriate advancement flap was drawn incorporating the defect, outlining the appropriate donor tissue and placing the expected incisions within the relaxed skin tension lines where possible.    The area thus outlined was incised deep to adipose tissue with a #15 scalpel blade.  The skin margins were undermined to an appropriate distance in all directions around the primary defect and laterally outward around the island pedicle utilizing iris scissors.  There was minimal undermining beneath the pedicle flap.
Island Pedicle Flap-Requiring Vessel Identification Text: The defect edges were debeveled with a #15 scalpel blade.  Given the location of the defect, shape of the defect and the proximity to free margins an island pedicle advancement flap was deemed most appropriate.  Using a sterile surgical marker, an appropriate advancement flap was drawn, based on the axial vessel mentioned above, incorporating the defect, outlining the appropriate donor tissue and placing the expected incisions within the relaxed skin tension lines where possible.    The area thus outlined was incised deep to adipose tissue with a #15 scalpel blade.  The skin margins were undermined to an appropriate distance in all directions around the primary defect and laterally outward around the island pedicle utilizing iris scissors.  There was minimal undermining beneath the pedicle flap.
Keystone Flap Text: The defect edges were debeveled with a #15 scalpel blade.  Given the location of the defect, shape of the defect a keystone flap was deemed most appropriate.  Using a sterile surgical marker, an appropriate keystone flap was drawn incorporating the defect, outlining the appropriate donor tissue and placing the expected incisions within the relaxed skin tension lines where possible. The area thus outlined was incised deep to adipose tissue with a #15 scalpel blade.  The skin margins were undermined to an appropriate distance in all directions around the primary defect and laterally outward around the flap utilizing iris scissors.
O-T Plasty Text: The defect edges were debeveled with a #15 scalpel blade.  Given the location of the defect, shape of the defect and the proximity to free margins an O-T plasty was deemed most appropriate.  Using a sterile surgical marker, an appropriate O-T plasty was drawn incorporating the defect and placing the expected incisions within the relaxed skin tension lines where possible.    The area thus outlined was incised deep to adipose tissue with a #15 scalpel blade.  The skin margins were undermined to an appropriate distance in all directions utilizing iris scissors.
O-Z Plasty Text: The defect edges were debeveled with a #15 scalpel blade.  Given the location of the defect, shape of the defect and the proximity to free margins an O-Z plasty (double transposition flap) was deemed most appropriate.  Using a sterile surgical marker, the appropriate transposition flaps were drawn incorporating the defect and placing the expected incisions within the relaxed skin tension lines where possible.    The area thus outlined was incised deep to adipose tissue with a #15 scalpel blade.  The skin margins were undermined to an appropriate distance in all directions utilizing iris scissors.  Hemostasis was achieved with electrocautery.  The flaps were then transposed into place, one clockwise and the other counterclockwise, and anchored with interrupted buried subcutaneous sutures.
Double O-Z Plasty Text: The defect edges were debeveled with a #15 scalpel blade. Given the location of the defect, shape of the defect and the proximity to free margins a Double O-Z plasty (double transposition flap) was deemed most appropriate. Using a sterile surgical marker, the appropriate transposition flaps were drawn incorporating the defect and placing the expected incisions within the relaxed skin tension lines where possible. The area thus outlined was incised deep to adipose tissue with a #15 scalpel blade. The skin margins were undermined to an appropriate distance in all directions utilizing iris scissors. Hemostasis was achieved with electrocautery. The flaps were then transposed and carried over into place, one clockwise and the other counterclockwise, and anchored with interrupted buried subcutaneous sutures.
V-Y Plasty Text: The defect edges were debeveled with a #15 scalpel blade.  Given the location of the defect, shape of the defect and the proximity to free margins an V-Y advancement flap was deemed most appropriate.  Using a sterile surgical marker, an appropriate advancement flap was drawn incorporating the defect and placing the expected incisions within the relaxed skin tension lines where possible.    The area thus outlined was incised deep to adipose tissue with a #15 scalpel blade.  The skin margins were undermined to an appropriate distance in all directions utilizing iris scissors.
H Plasty Text: Given the location of the defect, shape of the defect and the proximity to free margins a H-plasty was deemed most appropriate for repair.  Using a sterile surgical marker, the appropriate advancement arms of the H-plasty were drawn incorporating the defect and placing the expected incisions within the relaxed skin tension lines where possible. The area thus outlined was incised deep to adipose tissue with a #15 scalpel blade. The skin margins were undermined to an appropriate distance in all directions utilizing iris scissors.  The opposing advancement arms were then advanced into place in opposite direction and anchored with interrupted buried subcutaneous sutures.
W Plasty Text: The lesion was extirpated to the level of the fat with a #15 scalpel blade.  Given the location of the defect, shape of the defect and the proximity to free margins a W-plasty was deemed most appropriate for repair.  Using a sterile surgical marker, the appropriate transposition arms of the W-plasty were drawn incorporating the defect and placing the expected incisions within the relaxed skin tension lines where possible.    The area thus outlined was incised deep to adipose tissue with a #15 scalpel blade.  The skin margins were undermined to an appropriate distance in all directions utilizing iris scissors.  The opposing transposition arms were then transposed into place in opposite direction and anchored with interrupted buried subcutaneous sutures.
Z Plasty Text: The lesion was extirpated to the level of the fat with a #15 scalpel blade.  Given the location of the defect, shape of the defect and the proximity to free margins a Z-plasty was deemed most appropriate for repair.  Using a sterile surgical marker, the appropriate transposition arms of the Z-plasty were drawn incorporating the defect and placing the expected incisions within the relaxed skin tension lines where possible.    The area thus outlined was incised deep to adipose tissue with a #15 scalpel blade.  The skin margins were undermined to an appropriate distance in all directions utilizing iris scissors.  The opposing transposition arms were then transposed into place in opposite direction and anchored with interrupted buried subcutaneous sutures.
Double Z Plasty Text: The lesion was extirpated to the level of the fat with a #15 scalpel blade. Given the location of the defect, shape of the defect and the proximity to free margins a double Z-plasty was deemed most appropriate for repair. Using a sterile surgical marker, the appropriate transposition arms of the double Z-plasty were drawn incorporating the defect and placing the expected incisions within the relaxed skin tension lines where possible. The area thus outlined was incised deep to adipose tissue with a #15 scalpel blade. The skin margins were undermined to an appropriate distance in all directions utilizing iris scissors. The opposing transposition arms were then transposed and carried over into place in opposite direction and anchored with interrupted buried subcutaneous sutures.
Zygomaticofacial Flap Text: Given the location of the defect, shape of the defect and the proximity to free margins a zygomaticofacial flap was deemed most appropriate for repair. Using a sterile surgical marker, the appropriate flap was drawn incorporating the defect and placing the expected incisions within the relaxed skin tension lines where possible. The area thus outlined was incised deep to adipose tissue with a #15 scalpel blade with preservation of a vascular pedicle.  The skin margins were undermined to an appropriate distance in all directions utilizing iris scissors. The flap was then carried over into the defect and anchored with interrupted buried subcutaneous sutures.
Cheek Interpolation Flap Text: A decision was made to reconstruct the defect utilizing an interpolation axial flap and a staged reconstruction.  A telfa template was made of the defect.  This telfa template was then used to outline the Cheek Interpolation flap.  The donor area for the pedicle flap was then injected with anesthesia.  The flap was excised through the skin and subcutaneous tissue down to the layer of the underlying musculature.  The interpolation flap was carefully excised within this deep plane to maintain its blood supply.  The edges of the donor site were undermined.   The donor site was closed in a primary fashion.  The pedicle was then rotated into position and sutured.  Once the tube was sutured into place, adequate blood supply was confirmed with blanching and refill.  The pedicle was then wrapped with xeroform gauze and dressed appropriately with a telfa and gauze bandage to ensure continued blood supply and protect the attached pedicle.
Cheek-To-Nose Interpolation Flap Text: A decision was made to reconstruct the defect utilizing an interpolation axial flap and a staged reconstruction.  A telfa template was made of the defect.  This telfa template was then used to outline the Cheek-To-Nose Interpolation flap.  The donor area for the pedicle flap was then injected with anesthesia.  The flap was excised through the skin and subcutaneous tissue down to the layer of the underlying musculature.  The interpolation flap was carefully excised within this deep plane to maintain its blood supply.  The edges of the donor site were undermined.   The donor site was closed in a primary fashion.  The pedicle was then rotated into position and sutured.  Once the tube was sutured into place, adequate blood supply was confirmed with blanching and refill.  The pedicle was then wrapped with xeroform gauze and dressed appropriately with a telfa and gauze bandage to ensure continued blood supply and protect the attached pedicle.
Interpolation Flap Text: A decision was made to reconstruct the defect utilizing an interpolation axial flap and a staged reconstruction.  A telfa template was made of the defect.  This telfa template was then used to outline the interpolation flap.  The donor area for the pedicle flap was then injected with anesthesia.  The flap was excised through the skin and subcutaneous tissue down to the layer of the underlying musculature.  The interpolation flap was carefully excised within this deep plane to maintain its blood supply.  The edges of the donor site were undermined.   The donor site was closed in a primary fashion.  The pedicle was then rotated into position and sutured.  Once the tube was sutured into place, adequate blood supply was confirmed with blanching and refill.  The pedicle was then wrapped with xeroform gauze and dressed appropriately with a telfa and gauze bandage to ensure continued blood supply and protect the attached pedicle.
Melolabial Interpolation Flap Text: A decision was made to reconstruct the defect utilizing an interpolation axial flap and a staged reconstruction.  A telfa template was made of the defect.  This telfa template was then used to outline the melolabial interpolation flap.  The donor area for the pedicle flap was then injected with anesthesia.  The flap was excised through the skin and subcutaneous tissue down to the layer of the underlying musculature.  The pedicle flap was carefully excised within this deep plane to maintain its blood supply.  The edges of the donor site were undermined.   The donor site was closed in a primary fashion.  The pedicle was then rotated into position and sutured.  Once the tube was sutured into place, adequate blood supply was confirmed with blanching and refill.  The pedicle was then wrapped with xeroform gauze and dressed appropriately with a telfa and gauze bandage to ensure continued blood supply and protect the attached pedicle.
Mastoid Interpolation Flap Text: A decision was made to reconstruct the defect utilizing an interpolation axial flap and a staged reconstruction.  A telfa template was made of the defect.  This telfa template was then used to outline the mastoid interpolation flap.  The donor area for the pedicle flap was then injected with anesthesia.  The flap was excised through the skin and subcutaneous tissue down to the layer of the underlying musculature.  The pedicle flap was carefully excised within this deep plane to maintain its blood supply.  The edges of the donor site were undermined.   The donor site was closed in a primary fashion.  The pedicle was then rotated into position and sutured.  Once the tube was sutured into place, adequate blood supply was confirmed with blanching and refill.  The pedicle was then wrapped with xeroform gauze and dressed appropriately with a telfa and gauze bandage to ensure continued blood supply and protect the attached pedicle.
Posterior Auricular Interpolation Flap Text: A decision was made to reconstruct the defect utilizing an interpolation axial flap and a staged reconstruction.  A telfa template was made of the defect.  This telfa template was then used to outline the posterior auricular interpolation flap.  The donor area for the pedicle flap was then injected with anesthesia.  The flap was excised through the skin and subcutaneous tissue down to the layer of the underlying musculature.  The pedicle flap was carefully excised within this deep plane to maintain its blood supply.  The edges of the donor site were undermined.   The donor site was closed in a primary fashion.  The pedicle was then rotated into position and sutured.  Once the tube was sutured into place, adequate blood supply was confirmed with blanching and refill.  The pedicle was then wrapped with xeroform gauze and dressed appropriately with a telfa and gauze bandage to ensure continued blood supply and protect the attached pedicle.
Paramedian Forehead Flap Text: A decision was made to reconstruct the defect utilizing an interpolation axial flap and a staged reconstruction.  A telfa template was made of the defect.  This telfa template was then used to outline the paramedian forehead pedicle flap.  The donor area for the pedicle flap was then injected with anesthesia.  The flap was excised through the skin and subcutaneous tissue down to the layer of the underlying musculature.  The pedicle flap was carefully excised within this deep plane to maintain its blood supply.  The edges of the donor site were undermined.   The donor site was closed in a primary fashion.  The pedicle was then rotated into position and sutured.  Once the tube was sutured into place, adequate blood supply was confirmed with blanching and refill.  The pedicle was then wrapped with xeroform gauze and dressed appropriately with a telfa and gauze bandage to ensure continued blood supply and protect the attached pedicle.
Abbe Flap (Upper To Lower Lip) Text: The defect of the lower lip was assessed and measured.  Given the location and size of the defect, an Abbe flap was deemed most appropriate. Using a sterile surgical marker, an appropriate Abbe flap was measured and drawn on the upper lip. Local anesthesia was then infiltrated.  A scalpel was then used to incise the upper lip through and through the skin, vermilion, muscle and mucosa, leaving the flap pedicled on the opposite side.  The flap was then rotated and transferred to the lower lip defect.  The flap was then sutured into place with a three layer technique, closing the orbicularis oris muscle layer with subcutaneous buried sutures, followed by a mucosal layer and an epidermal layer.
Abbe Flap (Lower To Upper Lip) Text: The defect of the upper lip was assessed and measured.  Given the location and size of the defect, an Abbe flap was deemed most appropriate. Using a sterile surgical marker, an appropriate Abbe flap was measured and drawn on the lower lip. Local anesthesia was then infiltrated. A scalpel was then used to incise the upper lip through and through the skin, vermilion, muscle and mucosa, leaving the flap pedicled on the opposite side.  The flap was then rotated and transferred to the lower lip defect.  The flap was then sutured into place with a three layer technique, closing the orbicularis oris muscle layer with subcutaneous buried sutures, followed by a mucosal layer and an epidermal layer.
Estlander Flap (Upper To Lower Lip) Text: The defect of the lower lip was assessed and measured.  Given the location and size of the defect, an Estlander flap was deemed most appropriate. Using a sterile surgical marker, an appropriate Estlander flap was measured and drawn on the upper lip. Local anesthesia was then infiltrated. A scalpel was then used to incise the lateral aspect of the flap, through skin, muscle and mucosa, leaving the flap pedicled medially.  The flap was then rotated and positioned to fill the lower lip defect.  The flap was then sutured into place with a three layer technique, closing the orbicularis oris muscle layer with subcutaneous buried sutures, followed by a mucosal layer and an epidermal layer.
Cheiloplasty (Less Than 50%) Text: A decision was made to reconstruct the defect with a  cheiloplasty.  The defect was undermined extensively.  Additional obicularis oris muscle was excised with a 15 blade scalpel.  The defect was converted into a full thickness wedge, of less than 50% of the vertical height of the lip, to facilite a better cosmetic result.  Small vessels were then tied off with 5-0 monocyrl. The obicularis oris, superficial fascia, adipose and dermis were then reapproximated.  After the deeper layers were approximated the epidermis was reapproximated with particular care given to realign the vermilion border.
Cheiloplasty (Complex) Text: A decision was made to reconstruct the defect with a  cheiloplasty.  The defect was undermined extensively.  Additional obicularis oris muscle was excised with a 15 blade scalpel.  The defect was converted into a full thickness wedge to facilite a better cosmetic result.  Small vessels were then tied off with 5-0 monocyrl. The obicularis oris, superficial fascia, adipose and dermis were then reapproximated.  After the deeper layers were approximated the epidermis was reapproximated with particular care given to realign the vermilion border.
Ear Wedge Repair Text: A wedge excision was completed by carrying down an excision through the full thickness of the ear and cartilage with an inward facing Burow's triangle. The wound was then closed in a layered fashion.
Full Thickness Lip Wedge Repair (Flap) Text: Given the location of the defect and the proximity to free margins a full thickness wedge repair was deemed most appropriate.  Using a sterile surgical marker, the appropriate repair was drawn incorporating the defect and placing the expected incisions perpendicular to the vermilion border.  The vermilion border was also meticulously outlined to ensure appropriate reapproximation during the repair.  The area thus outlined was incised through and through with a #15 scalpel blade.  The muscularis and dermis were reaproximated with deep sutures following hemostasis. Care was taken to realign the vermilion border before proceeding with the superficial closure.  Once the vermilion was realigned the superfical and mucosal closure was finished.
Ftsg Text: The defect edges were debeveled with a #15 scalpel blade.  Given the location of the defect, shape of the defect and the proximity to free margins a full thickness skin graft was deemed most appropriate.  Using a sterile surgical marker, the primary defect shape was transferred to the donor site. The area thus outlined was incised deep to adipose tissue with a #15 scalpel blade.  The harvested graft was then trimmed of adipose tissue until only dermis and epidermis was left.  The skin margins of the secondary defect were undermined to an appropriate distance in all directions utilizing iris scissors.  The secondary defect was closed with interrupted buried subcutaneous sutures.  The skin edges were then re-apposed with running  sutures.  The skin graft was then placed in the primary defect and oriented appropriately.
Split-Thickness Skin Graft Text: The defect edges were debeveled with a #15 scalpel blade.  Given the location of the defect, shape of the defect and the proximity to free margins a split thickness skin graft was deemed most appropriate.  Using a sterile surgical marker, the primary defect shape was transferred to the donor site. The split thickness graft was then harvested.  The skin graft was then placed in the primary defect and oriented appropriately.
Pinch Graft Text: The defect edges were debeveled with a #15 scalpel blade. Given the location of the defect, shape of the defect and the proximity to free margins a pinch graft was deemed most appropriate. Using a sterile surgical marker, the primary defect shape was transferred to the donor site. The area thus outlined was incised deep to adipose tissue with a #15 scalpel blade.  The harvested graft was then trimmed of adipose tissue until only dermis and epidermis was left. The skin margins of the secondary defect were undermined to an appropriate distance in all directions utilizing iris scissors.  The secondary defect was closed with interrupted buried subcutaneous sutures.  The skin edges were then re-apposed with running  sutures.  The skin graft was then placed in the primary defect and oriented appropriately.
Burow's Graft Text: The defect edges were debeveled with a #15 scalpel blade. Given the location of the defect, shape of the defect, the proximity to free margins and the presence of a standing cone deformity a Burow's skin graft was deemed most appropriate. The standing cone was removed and this tissue was then trimmed to the shape of the primary defect. The adipose tissue was also removed until only dermis and epidermis were left.  The skin margins of the secondary defect were undermined to an appropriate distance in all directions utilizing iris scissors.  The secondary defect was closed with interrupted buried subcutaneous sutures.  The skin edges were then re-apposed with running  sutures.  The skin graft was then placed in the primary defect and oriented appropriately.
Cartilage Graft Text: The defect edges were debeveled with a #15 scalpel blade.  Given the location of the defect, shape of the defect, the fact the defect involved a full thickness cartilage defect a cartilage graft was deemed most appropriate.  An appropriate donor site was identified, cleansed, and anesthetized. The cartilage graft was then harvested and transferred to the recipient site, oriented appropriately and then sutured into place.  The secondary defect was then repaired using a primary closure.
Composite Graft Text: The defect edges were debeveled with a #15 scalpel blade.  Given the location of the defect, shape of the defect, the proximity to free margins and the fact the defect was full thickness a composite graft was deemed most appropriate.  The defect was outline and then transferred to the donor site.  A full thickness graft was then excised from the donor site. The graft was then placed in the primary defect, oriented appropriately and then sutured into place.  The secondary defect was then repaired using a primary closure.
Epidermal Autograft Text: The defect edges were debeveled with a #15 scalpel blade.  Given the location of the defect, shape of the defect and the proximity to free margins an epidermal autograft was deemed most appropriate.  Using a sterile surgical marker, the primary defect shape was transferred to the donor site. The epidermal graft was then harvested.  The skin graft was then placed in the primary defect and oriented appropriately.
Dermal Autograft Text: The defect edges were debeveled with a #15 scalpel blade.  Given the location of the defect, shape of the defect and the proximity to free margins a dermal autograft was deemed most appropriate.  Using a sterile surgical marker, the primary defect shape was transferred to the donor site. The area thus outlined was incised deep to adipose tissue with a #15 scalpel blade.  The harvested graft was then trimmed of adipose and epidermal tissue until only dermis was left.  The skin graft was then placed in the primary defect and oriented appropriately.
Skin Substitute Text: The defect edges were debeveled with a #15 scalpel blade.  Given the location of the defect, shape of the defect and the proximity to free margins a skin substitute graft was deemed most appropriate.  The graft material was trimmed to fit the size of the defect. The graft was then placed in the primary defect and oriented appropriately.
Tissue Cultured Epidermal Autograft Text: The defect edges were debeveled with a #15 scalpel blade.  Given the location of the defect, shape of the defect and the proximity to free margins a tissue cultured epidermal autograft was deemed most appropriate.  The graft was then trimmed to fit the size of the defect.  The graft was then placed in the primary defect and oriented appropriately.
Xenograft Text: The defect edges were debeveled with a #15 scalpel blade.  Given the location of the defect, shape of the defect and the proximity to free margins a xenograft was deemed most appropriate.  The graft was then trimmed to fit the size of the defect.  The graft was then placed in the primary defect and oriented appropriately.
Purse String (Simple) Text: Given the location of the defect and the characteristics of the surrounding skin a purse string closure was deemed most appropriate.  Undermining was performed circumfirentially around the surgical defect.  A purse string suture was then placed and tightened.
Purse String (Intermediate) Text: Given the location of the defect and the characteristics of the surrounding skin a purse string intermediate closure was deemed most appropriate.  Undermining was performed circumfirentially around the surgical defect.  A purse string suture was then placed and tightened.
Partial Purse String (Simple) Text: Given the location of the defect and the characteristics of the surrounding skin a simple purse string closure was deemed most appropriate.  Undermining was performed circumfirentially around the surgical defect.  A purse string suture was then placed and tightened. Wound tension only allowed a partial closure of the circular defect.
Partial Purse String (Intermediate) Text: Given the location of the defect and the characteristics of the surrounding skin an intermediate purse string closure was deemed most appropriate.  Undermining was performed circumfirentially around the surgical defect.  A purse string suture was then placed and tightened. Wound tension only allowed a partial closure of the circular defect.
Localized Dermabrasion With Wire Brush Text: The patient was draped in routine manner.  Localized dermabrasion using 3 x 17 mm wire brush was performed in routine manner to papillary dermis. This spot dermabrasion is being performed to complete skin cancer reconstruction. It also will eliminate the other sun damaged precancerous cells that are known to be part of the regional effect of a lifetime's worth of sun exposure. This localized dermabrasion is therapeutic and should not be considered cosmetic in any regard.
Tarsorrhaphy Text: A tarsorrhaphy was performed using Frost sutures.
Intermediate Repair And Flap Additional Text (Will Appearing After The Standard Complex Repair Text): The intermediate repair was not sufficient to completely close the primary defect. The remaining additional defect was repaired with the flap mentioned below.
Intermediate Repair And Graft Additional Text (Will Appearing After The Standard Complex Repair Text): The intermediate repair was not sufficient to completely close the primary defect. The remaining additional defect was repaired with the graft mentioned below.
Complex Repair And Flap Additional Text (Will Appearing After The Standard Complex Repair Text): The complex repair was not sufficient to completely close the primary defect. The remaining additional defect was repaired with the flap mentioned below.
Complex Repair And Graft Additional Text (Will Appearing After The Standard Complex Repair Text): The complex repair was not sufficient to completely close the primary defect. The remaining additional defect was repaired with the graft mentioned below.
Eyelid Full Thickness Repair - 86945: The eyelid defect was full thickness which required a wedge repair of the eyelid. Special care was taken to ensure that the eyelid margin was realligned when placing sutures.
Manual Repair Warning Statement: We plan on removing the manually selected variable below in favor of our much easier automatic structured text blocks found in the previous tab. We decided to do this to help make the flow better and give you the full power of structured data. Manual selection is never going to be ideal in our platform and I would encourage you to avoid using manual selection from this point on, especially since I will be sunsetting this feature. It is important that you do one of two things with the customized text below. First, you can save all of the text in a word file so you can have it for future reference. Second, transfer the text to the appropriate area in the Library tab. Lastly, if there is a flap or graft type which we do not have you need to let us know right away so I can add it in before the variable is hidden. No need to panic, we plan to give you roughly 6 months to make the change.
Same Histology In Subsequent Stages Text: The pattern and morphology of the tumor is as described in the first stage.
No Residual Tumor Seen Histology Text: There were no malignant cells seen in the sections examined.
Inflammation Suggestive Of Cancer Camouflage Histology Text: There was a dense lymphocytic infiltrate which prevented adequate histologic evaluation of adjacent structures.
Bcc Histology Text: There were numerous aggregates of basaloid cells.
Bcc Infiltrative Histology Text: There were numerous aggregates of basaloid cells demonstrating an infiltrative pattern.
Mart-1 - Positive Histology Text: MART-1 staining demonstrates areas of higher density and clustering of melanocytes with Pagetoid spread upwards within the epidermis. The surgical margins are positive for tumor cells.
Mart-1 - Negative Histology Text: MART-1 staining demonstrates a normal density and pattern of melanocytes along the dermal-epidermal junction. The surgical margins are negative for tumor cells.
Information: Selecting Yes will display possible errors in your note based on the variables you have selected. This validation is only offered as a suggestion for you. PLEASE NOTE THAT THE VALIDATION TEXT WILL BE REMOVED WHEN YOU FINALIZE YOUR NOTE. IF YOU WANT TO FAX A PRELIMINARY NOTE YOU WILL NEED TO TOGGLE THIS TO 'NO' IF YOU DO NOT WANT IT IN YOUR FAXED NOTE.

## 2024-02-01 ENCOUNTER — APPOINTMENT (RX ONLY)
Dept: URBAN - METROPOLITAN AREA CLINIC 148 | Facility: CLINIC | Age: 89
Setting detail: DERMATOLOGY
End: 2024-02-01

## 2024-02-01 DIAGNOSIS — Z48.817 ENCOUNTER FOR SURGICAL AFTERCARE FOLLOWING SURGERY ON THE SKIN AND SUBCUTANEOUS TISSUE: ICD-10-CM

## 2024-02-01 PROCEDURE — ? POST-OP WOUND CHECK

## 2024-02-01 PROCEDURE — 99024 POSTOP FOLLOW-UP VISIT: CPT

## 2024-02-01 ASSESSMENT — LOCATION DETAILED DESCRIPTION DERM: LOCATION DETAILED: LEFT LATERAL MALAR CHEEK

## 2024-02-01 ASSESSMENT — LOCATION ZONE DERM: LOCATION ZONE: FACE

## 2024-02-01 ASSESSMENT — LOCATION SIMPLE DESCRIPTION DERM: LOCATION SIMPLE: LEFT CHEEK

## 2024-02-01 NOTE — PROCEDURE: POST-OP WOUND CHECK
Detail Level: Simple
Add 10393 Cpt? (Important Note: In 2017 The Use Of 41862 Is Being Tracked By Cms To Determine Future Global Period Reimbursement For Global Periods): yes

## 2024-02-07 ENCOUNTER — APPOINTMENT (RX ONLY)
Dept: URBAN - METROPOLITAN AREA CLINIC 148 | Facility: CLINIC | Age: 89
Setting detail: DERMATOLOGY
End: 2024-02-07

## 2024-02-07 DIAGNOSIS — Z48.817 ENCOUNTER FOR SURGICAL AFTERCARE FOLLOWING SURGERY ON THE SKIN AND SUBCUTANEOUS TISSUE: ICD-10-CM

## 2024-02-07 PROCEDURE — 99024 POSTOP FOLLOW-UP VISIT: CPT

## 2024-02-07 PROCEDURE — ? POST-OP WOUND CHECK

## 2024-02-07 ASSESSMENT — LOCATION SIMPLE DESCRIPTION DERM: LOCATION SIMPLE: LEFT CHEEK

## 2024-02-07 ASSESSMENT — LOCATION DETAILED DESCRIPTION DERM: LOCATION DETAILED: LEFT LATERAL MALAR CHEEK

## 2024-02-07 ASSESSMENT — LOCATION ZONE DERM: LOCATION ZONE: FACE

## 2024-02-07 NOTE — PROCEDURE: POST-OP WOUND CHECK
Detail Level: Detailed
Add 99573 Cpt? (Important Note: In 2017 The Use Of 74072 Is Being Tracked By Cms To Determine Future Global Period Reimbursement For Global Periods): yes
Wound Assessment Override (Optional): healing slowly, crusted, well approximated
Wound Evaluated By: Francoise MUNOZ
Additional Comments: Sutures to remain in for a week to allow for further healing. Patient to clean area daily and remove any crusting, then continue ointment and bandage daily, patient verbalizes understanding and agrees with plan of care.

## 2024-02-14 ENCOUNTER — APPOINTMENT (RX ONLY)
Dept: URBAN - METROPOLITAN AREA CLINIC 148 | Facility: CLINIC | Age: 89
Setting detail: DERMATOLOGY
End: 2024-02-14

## 2024-02-14 DIAGNOSIS — T81.31X: ICD-10-CM

## 2024-02-14 PROBLEM — T81.31XA DISRUPTION OF EXTERNAL OPERATION (SURGICAL) WOUND, NOT ELSEWHERE CLASSIFIED, INITIAL ENCOUNTER: Status: ACTIVE | Noted: 2024-02-14

## 2024-02-14 PROCEDURE — ? EXTENSIVE SURGICAL SERVICE OF COMPLICATION

## 2024-02-14 PROCEDURE — 13160 SEC CLSR SURG WND/DEHSN XTN: CPT

## 2024-02-14 ASSESSMENT — LOCATION ZONE DERM: LOCATION ZONE: FACE

## 2024-02-14 ASSESSMENT — LOCATION SIMPLE DESCRIPTION DERM: LOCATION SIMPLE: LEFT CHEEK

## 2024-02-14 ASSESSMENT — LOCATION DETAILED DESCRIPTION DERM: LOCATION DETAILED: LEFT LATERAL MALAR CHEEK

## 2024-02-14 NOTE — PROCEDURE: EXTENSIVE SURGICAL SERVICE OF COMPLICATION
Detail Level: Detailed
Surgical Service Of Complication: secondary closure of surgical wound, extensive or complicated
Anesthesia Type: 1% lidocaine with 1:100,000 epinephrine, 0.5% Marcaine and 8.4% sodium bicarbonate
Anesthesia Volume In Cc: 3
Size Of Lesion In Cm (Optional): 0
Consent was obtained and risks were reviewed including but not limited to delayed wound healing, infection, bleeding, hematoma, dehiscence, and pain.

## 2024-02-15 ENCOUNTER — CLINIC PROCEDURE ONLY (OUTPATIENT)
Dept: URBAN - METROPOLITAN AREA CLINIC 26 | Facility: CLINIC | Age: 89
End: 2024-02-15

## 2024-02-15 DIAGNOSIS — H34.8320: ICD-10-CM

## 2024-02-15 DIAGNOSIS — H35.3231: ICD-10-CM

## 2024-02-15 PROCEDURE — J3490AVA AVASTIN *

## 2024-02-15 PROCEDURE — 67028 INJECTION EYE DRUG: CPT

## 2024-02-15 PROCEDURE — 92250 FUNDUS PHOTOGRAPHY W/I&R: CPT

## 2024-02-15 PROCEDURE — 92134 CPTRZ OPH DX IMG PST SGM RTA: CPT

## 2024-02-15 ASSESSMENT — TONOMETRY
OS_IOP_MMHG: 11
OD_IOP_MMHG: 12

## 2024-02-15 ASSESSMENT — VISUAL ACUITY
OD_SC: 20/30+2
OS_SC: 20/200+2

## 2024-02-21 ENCOUNTER — APPOINTMENT (RX ONLY)
Dept: URBAN - METROPOLITAN AREA CLINIC 148 | Facility: CLINIC | Age: 89
Setting detail: DERMATOLOGY
End: 2024-02-21

## 2024-02-21 DIAGNOSIS — Z48.02 ENCOUNTER FOR REMOVAL OF SUTURES: ICD-10-CM

## 2024-02-21 PROCEDURE — ? SUTURE REMOVAL (GLOBAL PERIOD)

## 2024-02-21 PROCEDURE — 99024 POSTOP FOLLOW-UP VISIT: CPT

## 2024-02-21 ASSESSMENT — LOCATION ZONE DERM: LOCATION ZONE: FACE

## 2024-02-21 ASSESSMENT — LOCATION DETAILED DESCRIPTION DERM: LOCATION DETAILED: LEFT LATERAL MALAR CHEEK

## 2024-02-21 ASSESSMENT — LOCATION SIMPLE DESCRIPTION DERM: LOCATION SIMPLE: LEFT CHEEK

## 2024-02-21 NOTE — PROCEDURE: SUTURE REMOVAL (GLOBAL PERIOD)
Detail Level: Detailed
Add 02211 Cpt? (Important Note: In 2017 The Use Of 83903 Is Being Tracked By Cms To Determine Future Global Period Reimbursement For Global Periods): yes

## 2024-02-28 NOTE — CONSULT NOTE ADULT - CONSULT REASON
Dizzy
Severe AS
Severe aortic stenosis, [facility] indidated for coronary evaluation
carotid stenosis
dizzy
Constitutional:  disheveled, umkempt, slurrred speech   Head:  NC/AT, symmetric  ENMT: Airway patent  Eyes:  Clear bilaterally, pupils equal, round   Cardiac:  Normal rate  Respiratory:  Normal respiratory rate and effort  GI:   Abd soft, non-distended  MSK:  Atraumatic, FROM  Neuro:  Alert and oriented  Skin:  Skin normal color for race, warm, dry and intact.    Psych:  Normal mood and affect, no apparent risk to self or others.
2nd surgeon consultation

## 2024-03-28 ENCOUNTER — CLINIC PROCEDURE ONLY (OUTPATIENT)
Dept: URBAN - METROPOLITAN AREA CLINIC 26 | Facility: CLINIC | Age: 89
End: 2024-03-28

## 2024-03-28 DIAGNOSIS — H34.8320: ICD-10-CM

## 2024-03-28 DIAGNOSIS — H35.3231: ICD-10-CM

## 2024-03-28 PROCEDURE — J3490AVA AVASTIN *

## 2024-03-28 PROCEDURE — 92134 CPTRZ OPH DX IMG PST SGM RTA: CPT

## 2024-03-28 PROCEDURE — 92250 FUNDUS PHOTOGRAPHY W/I&R: CPT

## 2024-03-28 PROCEDURE — 67028 INJECTION EYE DRUG: CPT

## 2024-03-28 ASSESSMENT — TONOMETRY
OS_IOP_MMHG: 17
OD_IOP_MMHG: 17

## 2024-03-28 ASSESSMENT — VISUAL ACUITY
OS_CC: 20/200
OD_CC: 20/50

## 2024-05-09 ENCOUNTER — CLINIC PROCEDURE ONLY (OUTPATIENT)
Dept: URBAN - METROPOLITAN AREA CLINIC 26 | Facility: CLINIC | Age: 89
End: 2024-05-09

## 2024-05-09 DIAGNOSIS — H34.8320: ICD-10-CM

## 2024-05-09 DIAGNOSIS — H35.3231: ICD-10-CM

## 2024-05-09 PROCEDURE — 92134 CPTRZ OPH DX IMG PST SGM RTA: CPT

## 2024-05-09 PROCEDURE — 92250 FUNDUS PHOTOGRAPHY W/I&R: CPT

## 2024-05-09 PROCEDURE — 67028 INJECTION EYE DRUG: CPT | Mod: 50,RT

## 2024-05-09 PROCEDURE — J3490AVA AVASTIN *

## 2024-05-09 ASSESSMENT — VISUAL ACUITY
OD_SC: 20/30-2
OS_SC: 20/200-1

## 2024-05-09 ASSESSMENT — TONOMETRY
OS_IOP_MMHG: 13
OD_IOP_MMHG: 11

## 2024-05-24 ENCOUNTER — APPOINTMENT (RX ONLY)
Dept: URBAN - METROPOLITAN AREA CLINIC 148 | Facility: CLINIC | Age: 89
Setting detail: DERMATOLOGY
End: 2024-05-24

## 2024-05-24 DIAGNOSIS — L81.4 OTHER MELANIN HYPERPIGMENTATION: ICD-10-CM

## 2024-05-24 DIAGNOSIS — Z85.828 PERSONAL HISTORY OF OTHER MALIGNANT NEOPLASM OF SKIN: ICD-10-CM

## 2024-05-24 DIAGNOSIS — L82.1 OTHER SEBORRHEIC KERATOSIS: ICD-10-CM

## 2024-05-24 DIAGNOSIS — L57.0 ACTINIC KERATOSIS: ICD-10-CM

## 2024-05-24 PROCEDURE — ? SUNSCREEN RECOMMENDATIONS

## 2024-05-24 PROCEDURE — ? COUNSELING

## 2024-05-24 PROCEDURE — 17000 DESTRUCT PREMALG LESION: CPT

## 2024-05-24 PROCEDURE — 17003 DESTRUCT PREMALG LES 2-14: CPT

## 2024-05-24 PROCEDURE — 99213 OFFICE O/P EST LOW 20 MIN: CPT | Mod: 25

## 2024-05-24 PROCEDURE — ? LIQUID NITROGEN

## 2024-05-24 ASSESSMENT — LOCATION SIMPLE DESCRIPTION DERM
LOCATION SIMPLE: LEFT SCALP
LOCATION SIMPLE: LEFT UPPER BACK
LOCATION SIMPLE: SCALP
LOCATION SIMPLE: RIGHT UPPER ARM
LOCATION SIMPLE: RIGHT SCALP
LOCATION SIMPLE: RIGHT UPPER BACK
LOCATION SIMPLE: LEFT FOREARM
LOCATION SIMPLE: LEFT OCCIPITAL SCALP
LOCATION SIMPLE: CHEST
LOCATION SIMPLE: LEFT FOREHEAD

## 2024-05-24 ASSESSMENT — LOCATION DETAILED DESCRIPTION DERM
LOCATION DETAILED: RIGHT MEDIAL FRONTAL SCALP
LOCATION DETAILED: LEFT SUPERIOR UPPER BACK
LOCATION DETAILED: LEFT SUPERIOR FOREHEAD
LOCATION DETAILED: LEFT CENTRAL FRONTAL SCALP
LOCATION DETAILED: RIGHT DISTAL POSTERIOR UPPER ARM
LOCATION DETAILED: LEFT SUPERIOR PARIETAL SCALP
LOCATION DETAILED: LEFT SUPERIOR OCCIPITAL SCALP
LOCATION DETAILED: RIGHT MEDIAL INFERIOR CHEST
LOCATION DETAILED: RIGHT SUPERIOR MEDIAL UPPER BACK
LOCATION DETAILED: LEFT MEDIAL INFERIOR CHEST
LOCATION DETAILED: LEFT PROXIMAL DORSAL FOREARM

## 2024-05-24 ASSESSMENT — LOCATION ZONE DERM
LOCATION ZONE: SCALP
LOCATION ZONE: FACE
LOCATION ZONE: ARM
LOCATION ZONE: TRUNK

## 2024-06-11 ENCOUNTER — FOLLOW UP (OUTPATIENT)
Dept: URBAN - METROPOLITAN AREA CLINIC 26 | Facility: CLINIC | Age: 89
End: 2024-06-11

## 2024-06-11 DIAGNOSIS — H34.8320: ICD-10-CM

## 2024-06-11 DIAGNOSIS — H35.3231: ICD-10-CM

## 2024-06-11 PROCEDURE — 92250 FUNDUS PHOTOGRAPHY W/I&R: CPT

## 2024-06-11 PROCEDURE — J3490AVA AVASTIN *

## 2024-06-11 PROCEDURE — 67028 INJECTION EYE DRUG: CPT | Mod: 50,RT

## 2024-06-11 PROCEDURE — 92134 CPTRZ OPH DX IMG PST SGM RTA: CPT

## 2024-06-11 ASSESSMENT — TONOMETRY
OD_IOP_MMHG: 11
OS_IOP_MMHG: 12

## 2024-06-11 ASSESSMENT — VISUAL ACUITY
OS_SC: 20/200-1
OD_SC: 20/30-2

## 2024-06-14 NOTE — H&P PST ADULT - PRESSURE ULCER(S)
Problem: Adult Inpatient Plan of Care  Goal: Plan of Care Review  Outcome: Progressing  Goal: Patient-Specific Goal (Individualized)  Outcome: Progressing  Goal: Absence of Hospital-Acquired Illness or Injury  Outcome: Progressing  Goal: Optimal Comfort and Wellbeing  Outcome: Progressing  Goal: Readiness for Transition of Care  Outcome: Progressing     Problem: Stroke, Ischemic (Includes Transient Ischemic Attack)  Goal: Optimal Coping  Outcome: Progressing  Goal: Effective Bowel Elimination  Outcome: Progressing  Goal: Optimal Cerebral Tissue Perfusion  Outcome: Progressing  Goal: Optimal Cognitive Function  Outcome: Progressing  Goal: Improved Communication Skills  Outcome: Progressing  Goal: Optimal Functional Ability  Outcome: Progressing  Goal: Optimal Nutrition Intake  Outcome: Progressing  Goal: Effective Oxygenation and Ventilation  Outcome: Progressing  Goal: Improved Sensorimotor Function  Outcome: Progressing  Goal: Safe and Effective Swallow  Outcome: Progressing  Goal: Effective Urinary Elimination  Outcome: Progressing     Problem: Skin Injury Risk Increased  Goal: Skin Health and Integrity  Outcome: Progressing     Problem: Fall Injury Risk  Goal: Absence of Fall and Fall-Related Injury  Outcome: Progressing      no

## 2024-07-26 ENCOUNTER — CLINIC PROCEDURE ONLY (OUTPATIENT)
Dept: URBAN - METROPOLITAN AREA CLINIC 26 | Facility: CLINIC | Age: 89
End: 2024-07-26

## 2024-07-26 DIAGNOSIS — H34.8320: ICD-10-CM

## 2024-07-26 DIAGNOSIS — H35.3231: ICD-10-CM

## 2024-07-26 PROCEDURE — J3490AVA AVASTIN *

## 2024-07-26 PROCEDURE — 92250 FUNDUS PHOTOGRAPHY W/I&R: CPT

## 2024-07-26 PROCEDURE — 92134 CPTRZ OPH DX IMG PST SGM RTA: CPT

## 2024-07-26 PROCEDURE — 67028 INJECTION EYE DRUG: CPT | Mod: 50,LT

## 2024-07-26 PROCEDURE — 67028 INJECTION EYE DRUG: CPT | Mod: 50,RT

## 2024-07-26 ASSESSMENT — VISUAL ACUITY
OS_SC: 20/200-2
OD_SC: 20/25-2

## 2024-07-26 ASSESSMENT — TONOMETRY
OD_IOP_MMHG: 11
OS_IOP_MMHG: 14

## 2024-09-18 ENCOUNTER — APPOINTMENT (RX ONLY)
Dept: URBAN - METROPOLITAN AREA CLINIC 148 | Facility: CLINIC | Age: 89
Setting detail: DERMATOLOGY
End: 2024-09-18

## 2024-09-18 DIAGNOSIS — D69.2 OTHER NONTHROMBOCYTOPENIC PURPURA: ICD-10-CM

## 2024-09-18 DIAGNOSIS — Z85.828 PERSONAL HISTORY OF OTHER MALIGNANT NEOPLASM OF SKIN: ICD-10-CM

## 2024-09-18 DIAGNOSIS — L57.0 ACTINIC KERATOSIS: ICD-10-CM

## 2024-09-18 DIAGNOSIS — L57.8 OTHER SKIN CHANGES DUE TO CHRONIC EXPOSURE TO NONIONIZING RADIATION: ICD-10-CM

## 2024-09-18 DIAGNOSIS — L82.1 OTHER SEBORRHEIC KERATOSIS: ICD-10-CM

## 2024-09-18 DIAGNOSIS — L81.4 OTHER MELANIN HYPERPIGMENTATION: ICD-10-CM

## 2024-09-18 PROCEDURE — ? LIQUID NITROGEN

## 2024-09-18 PROCEDURE — 99213 OFFICE O/P EST LOW 20 MIN: CPT | Mod: 25

## 2024-09-18 PROCEDURE — 17003 DESTRUCT PREMALG LES 2-14: CPT

## 2024-09-18 PROCEDURE — ? SUNSCREEN RECOMMENDATIONS

## 2024-09-18 PROCEDURE — ? COUNSELING

## 2024-09-18 PROCEDURE — 17000 DESTRUCT PREMALG LESION: CPT

## 2024-09-18 ASSESSMENT — LOCATION SIMPLE DESCRIPTION DERM
LOCATION SIMPLE: CHEST
LOCATION SIMPLE: LEFT PRETIBIAL REGION
LOCATION SIMPLE: SCALP
LOCATION SIMPLE: LEFT HAND
LOCATION SIMPLE: NOSE
LOCATION SIMPLE: LEFT SCALP
LOCATION SIMPLE: LEFT UPPER BACK
LOCATION SIMPLE: RIGHT UPPER ARM
LOCATION SIMPLE: LEFT FOREARM
LOCATION SIMPLE: RIGHT CHEEK
LOCATION SIMPLE: RIGHT FOREHEAD
LOCATION SIMPLE: LEFT CHEEK
LOCATION SIMPLE: RIGHT FOREARM
LOCATION SIMPLE: LEFT FOREHEAD
LOCATION SIMPLE: LEFT EAR
LOCATION SIMPLE: RIGHT UPPER BACK
LOCATION SIMPLE: RIGHT PRETIBIAL REGION

## 2024-09-18 ASSESSMENT — LOCATION DETAILED DESCRIPTION DERM
LOCATION DETAILED: RIGHT DISTAL POSTERIOR UPPER ARM
LOCATION DETAILED: LEFT SUPERIOR UPPER BACK
LOCATION DETAILED: LEFT LATERAL MALAR CHEEK
LOCATION DETAILED: RIGHT PROXIMAL PRETIBIAL REGION
LOCATION DETAILED: LEFT SUPERIOR MEDIAL FOREHEAD
LOCATION DETAILED: RIGHT SUPERIOR LATERAL MALAR CHEEK
LOCATION DETAILED: RIGHT SUPERIOR MEDIAL UPPER BACK
LOCATION DETAILED: LEFT SUPERIOR FRONTAL SCALP
LOCATION DETAILED: RIGHT FOREHEAD
LOCATION DETAILED: LEFT PROXIMAL PRETIBIAL REGION
LOCATION DETAILED: LEFT CENTRAL MALAR CHEEK
LOCATION DETAILED: RIGHT PROXIMAL DORSAL FOREARM
LOCATION DETAILED: LEFT MEDIAL INFERIOR CHEST
LOCATION DETAILED: RIGHT MEDIAL INFERIOR CHEST
LOCATION DETAILED: LEFT CENTRAL POSTAURICULAR SKIN
LOCATION DETAILED: LEFT ULNAR DORSAL HAND
LOCATION DETAILED: LEFT NASAL DORSUM
LOCATION DETAILED: LEFT SUPERIOR PARIETAL SCALP
LOCATION DETAILED: RIGHT INFERIOR CENTRAL MALAR CHEEK
LOCATION DETAILED: LEFT MEDIAL FRONTAL SCALP
LOCATION DETAILED: LEFT SUPERIOR HELIX
LOCATION DETAILED: RIGHT SUPERIOR PARIETAL SCALP
LOCATION DETAILED: LEFT SUPERIOR CENTRAL MALAR CHEEK
LOCATION DETAILED: LEFT SUPERIOR FOREHEAD
LOCATION DETAILED: LEFT PROXIMAL DORSAL FOREARM

## 2024-09-18 ASSESSMENT — LOCATION ZONE DERM
LOCATION ZONE: SCALP
LOCATION ZONE: HAND
LOCATION ZONE: TRUNK
LOCATION ZONE: ARM
LOCATION ZONE: NOSE
LOCATION ZONE: FACE
LOCATION ZONE: LEG
LOCATION ZONE: EAR

## 2024-11-15 ENCOUNTER — FOLLOW UP (OUTPATIENT)
Dept: URBAN - METROPOLITAN AREA CLINIC 26 | Facility: CLINIC | Age: 89
End: 2024-11-15

## 2024-11-15 VITALS — BODY MASS INDEX: 25.9 KG/M2 | HEIGHT: 67 IN | WEIGHT: 165 LBS

## 2024-11-15 DIAGNOSIS — E11.9: ICD-10-CM

## 2024-11-15 DIAGNOSIS — H04.123: ICD-10-CM

## 2024-11-15 DIAGNOSIS — H34.211: ICD-10-CM

## 2024-11-15 DIAGNOSIS — H02.834: ICD-10-CM

## 2024-11-15 DIAGNOSIS — H35.042: ICD-10-CM

## 2024-11-15 DIAGNOSIS — H34.8320: ICD-10-CM

## 2024-11-15 DIAGNOSIS — H35.62: ICD-10-CM

## 2024-11-15 DIAGNOSIS — Z96.1: ICD-10-CM

## 2024-11-15 DIAGNOSIS — H35.3231: ICD-10-CM

## 2024-11-15 DIAGNOSIS — H43.813: ICD-10-CM

## 2024-11-15 DIAGNOSIS — H02.831: ICD-10-CM

## 2024-11-15 PROCEDURE — 92134 CPTRZ OPH DX IMG PST SGM RTA: CPT

## 2024-11-15 PROCEDURE — 92250 FUNDUS PHOTOGRAPHY W/I&R: CPT

## 2024-11-15 PROCEDURE — 67028 INJECTION EYE DRUG: CPT | Mod: 50,LT

## 2024-11-15 PROCEDURE — 92014 COMPRE OPH EXAM EST PT 1/>: CPT

## 2024-12-27 ENCOUNTER — CLINIC PROCEDURE ONLY (OUTPATIENT)
Age: 89
End: 2024-12-27

## 2024-12-27 DIAGNOSIS — H34.8320: ICD-10-CM

## 2024-12-27 DIAGNOSIS — H35.3231: ICD-10-CM

## 2024-12-27 PROCEDURE — 92134 CPTRZ OPH DX IMG PST SGM RTA: CPT

## 2024-12-27 PROCEDURE — 92250 FUNDUS PHOTOGRAPHY W/I&R: CPT

## 2024-12-27 PROCEDURE — 67028 INJECTION EYE DRUG: CPT | Mod: 50,RT

## 2025-01-22 ENCOUNTER — APPOINTMENT (OUTPATIENT)
Dept: URBAN - METROPOLITAN AREA CLINIC 148 | Facility: CLINIC | Age: 89
Setting detail: DERMATOLOGY
End: 2025-01-22

## 2025-01-22 DIAGNOSIS — L82.1 OTHER SEBORRHEIC KERATOSIS: ICD-10-CM

## 2025-01-22 DIAGNOSIS — D485 NEOPLASM OF UNCERTAIN BEHAVIOR OF SKIN: ICD-10-CM

## 2025-01-22 DIAGNOSIS — L81.4 OTHER MELANIN HYPERPIGMENTATION: ICD-10-CM

## 2025-01-22 DIAGNOSIS — L57.0 ACTINIC KERATOSIS: ICD-10-CM

## 2025-01-22 DIAGNOSIS — Z85.828 PERSONAL HISTORY OF OTHER MALIGNANT NEOPLASM OF SKIN: ICD-10-CM

## 2025-01-22 PROBLEM — D48.5 NEOPLASM OF UNCERTAIN BEHAVIOR OF SKIN: Status: ACTIVE | Noted: 2025-01-22

## 2025-01-22 PROCEDURE — ? BIOPSY BY SHAVE METHOD

## 2025-01-22 PROCEDURE — 11102 TANGNTL BX SKIN SINGLE LES: CPT

## 2025-01-22 PROCEDURE — ? LIQUID NITROGEN

## 2025-01-22 PROCEDURE — 99213 OFFICE O/P EST LOW 20 MIN: CPT | Mod: 25

## 2025-01-22 PROCEDURE — 17003 DESTRUCT PREMALG LES 2-14: CPT

## 2025-01-22 PROCEDURE — ? COUNSELING

## 2025-01-22 PROCEDURE — 17000 DESTRUCT PREMALG LESION: CPT | Mod: 59

## 2025-01-22 ASSESSMENT — LOCATION DETAILED DESCRIPTION DERM
LOCATION DETAILED: LEFT SUPERIOR FOREHEAD
LOCATION DETAILED: RIGHT SUPERIOR OCCIPITAL SCALP
LOCATION DETAILED: LEFT SUPERIOR OCCIPITAL SCALP
LOCATION DETAILED: LEFT CENTRAL FRONTAL SCALP
LOCATION DETAILED: RIGHT LATERAL ZYGOMA
LOCATION DETAILED: LEFT CENTRAL PARIETAL SCALP
LOCATION DETAILED: LEFT MEDIAL FRONTAL SCALP
LOCATION DETAILED: LEFT SUPERIOR PARIETAL SCALP
LOCATION DETAILED: RIGHT CENTRAL PARIETAL SCALP
LOCATION DETAILED: RIGHT CENTRAL TEMPLE
LOCATION DETAILED: RIGHT SUPERIOR PARIETAL SCALP
LOCATION DETAILED: RIGHT SUPERIOR MEDIAL FOREHEAD
LOCATION DETAILED: RIGHT CENTRAL ZYGOMA
LOCATION DETAILED: LEFT LATERAL BUCCAL CHEEK
LOCATION DETAILED: RIGHT FOREHEAD

## 2025-01-22 ASSESSMENT — LOCATION SIMPLE DESCRIPTION DERM
LOCATION SIMPLE: LEFT SCALP
LOCATION SIMPLE: LEFT OCCIPITAL SCALP
LOCATION SIMPLE: SCALP
LOCATION SIMPLE: LEFT CHEEK
LOCATION SIMPLE: RIGHT OCCIPITAL SCALP
LOCATION SIMPLE: RIGHT TEMPLE
LOCATION SIMPLE: RIGHT FOREHEAD
LOCATION SIMPLE: LEFT FOREHEAD
LOCATION SIMPLE: RIGHT ZYGOMA

## 2025-01-22 ASSESSMENT — LOCATION ZONE DERM
LOCATION ZONE: SCALP
LOCATION ZONE: FACE

## 2025-02-05 ENCOUNTER — APPOINTMENT (OUTPATIENT)
Dept: URBAN - METROPOLITAN AREA CLINIC 148 | Facility: CLINIC | Age: 89
Setting detail: DERMATOLOGY
End: 2025-02-05

## 2025-02-05 DIAGNOSIS — Z48.1 ENCOUNTER FOR PLANNED POSTPROCEDURAL WOUND CLOSURE: ICD-10-CM

## 2025-02-05 PROBLEM — D04.4 CARCINOMA IN SITU OF SKIN OF SCALP AND NECK: Status: ACTIVE | Noted: 2025-02-05

## 2025-02-05 PROCEDURE — ? MOHS SURGERY

## 2025-02-05 PROCEDURE — 17311 MOHS 1 STAGE H/N/HF/G: CPT

## 2025-02-05 PROCEDURE — ? REPAIR NOTE

## 2025-02-05 PROCEDURE — 13121 CMPLX RPR S/A/L 2.6-7.5 CM: CPT

## 2025-02-05 ASSESSMENT — LOCATION DETAILED DESCRIPTION DERM: LOCATION DETAILED: LEFT SUPERIOR PARIETAL SCALP

## 2025-02-05 ASSESSMENT — LOCATION SIMPLE DESCRIPTION DERM: LOCATION SIMPLE: SCALP

## 2025-02-05 ASSESSMENT — LOCATION ZONE DERM: LOCATION ZONE: SCALP

## 2025-02-05 NOTE — PROCEDURE: MOHS SURGERY
Mohs Case Number: [id number]
Date Of Previous Biopsy (Optional): 1-22-25
Previous Accession (Optional): [id number]
Biopsy Photograph Reviewed: Yes
Consent Type: Consent 1 (Standard)
Eye Shield Used: No
Initial Size Of Lesion: 2.3
X Size Of Lesion In Cm (Optional): 0
Number Of Stages: 1
Primary Defect Length In Cm (Final Defect Size - Required For Flaps/Grafts): 2.5
Primary Defect Width In Cm (Final Defect Size - Required For Flaps/Grafts): 2.2
Repair Type: Referred to plastics for closure
Which Instrument Did You Use For Dermabrasion?: Wire Brush
Which Eyelid Repair Cpt Are You Using?: 96183
Oculoplastic Surgeon Procedure Text (A): After obtaining clear surgical margins the patient was sent to oculoplastics for surgical repair.  The patient understands they will receive post-surgical care and follow-up from the referring physician's office.
Otolaryngologist Procedure Text (A): After obtaining clear surgical margins the patient was sent to otolaryngology for surgical repair.  The patient understands they will receive post-surgical care and follow-up from the referring physician's office.
Plastic Surgeon Procedure Text (A): After obtaining clear surgical margins the patient was sent to plastics for surgical repair.  The patient understands they will receive post-surgical care and follow-up from the referring physician's office.
Mid-Level Procedure Text (A): After obtaining clear surgical margins the patient was sent to a mid-level provider for surgical repair.  The patient understands they will receive post-surgical care and follow-up from the mid-level provider.
Provider Procedure Text (A): After obtaining clear surgical margins the defect was repaired by another provider.
Asc Procedure Text (A): After obtaining clear surgical margins the patient was sent to an ASC for surgical repair.  The patient understands they will receive post-surgical care and follow-up from the ASC physician.
Deep Sutures: 4-0 Monocryl
Suture Removal: 14 days
Suturegard Retention Suture: 2-0 Nylon
Retention Suture Bite Size: 3 mm
Length To Time In Minutes Device Was In Place: 10
Undermining Type: Entire Wound
Debridement Text: The wound edges were debrided prior to proceeding with the closure to facilitate wound healing.
Helical Rim Text: The closure involved the helical rim.
Vermilion Border Text: The closure involved the vermilion border.
Nostril Rim Text: The closure involved the nostril rim.
Retention Suture Text: Retention sutures were placed to support the closure and prevent dehiscence.
Location Indication Override (Is Already Calculated Based On Selected Body Location): Area M
Area H Indication Text: Tumors in this location are included in Area H (eyelids, eyebrows, nose, lips, chin, ear, pre-auricular, post-auricular, temple, genitalia, hands, feet, ankles and areola).  Tissue conservation is critical in these anatomic locations.
Area M Indication Text: Tumors in this location are included in Area M (cheek, forehead, scalp, neck, jawline and pretibial skin).  Mohs surgery is indicated for tumors in these anatomic locations.
Area L Indication Text: Tumors in this location are included in Area L (trunk and extremities).  Mohs surgery is indicated for larger tumors, or tumors with aggressive histologic features, in these anatomic locations.
Perineural Invasion (For Histology - Be Specific If Possible): absent
Special Stains Stage 1 - Results: Base On Clearance Noted Above
Stage 2: Additional Anesthesia Type: 1% lidocaine with epinephrine
Staging Info: By selecting yes to the question above you will include information on AJCC 8 tumor staging in your Mohs note. Information on tumor staging will be automatically added for SCCs on the head and neck. AJCC 8 includes tumor size, tumor depth, perineural involvement and bone invasion.
Tumor Depth: Less than 6mm from granular layer and no invasion beyond the subcutaneous fat
Was The Patient On Physician Recommended Anticoagulation Therapy?: Please Select the Appropriate Response
Medical Necessity Statement: Based on my medical judgement, Mohs surgery is the most appropriate treatment for this cancer compared to other treatments.
Alternatives Discussed Intro (Do Not Add Period): I discussed alternative treatments to Mohs surgery and specifically discussed the risks and benefits of
Consent 1/Introductory Paragraph: The rationale for Mohs was explained to the patient and consent was obtained. The risks, benefits and alternatives to therapy were discussed in detail. Specifically, the risks of infection, scarring, bleeding, prolonged wound healing, incomplete removal, allergy to anesthesia, nerve injury and recurrence were addressed. Prior to the procedure, the treatment site was clearly identified and confirmed by the patient. All components of Universal Protocol/PAUSE Rule completed.
Consent 2/Introductory Paragraph: Mohs surgery was explained to the patient and consent was obtained. The risks, benefits and alternatives to therapy were discussed in detail. Specifically, the risks of infection, scarring, bleeding, prolonged wound healing, incomplete removal, allergy to anesthesia, nerve injury and recurrence were addressed. Prior to the procedure, the treatment site was clearly identified and confirmed by the patient. All components of Universal Protocol/PAUSE Rule completed.
Consent 3/Introductory Paragraph: I gave the patient a chance to ask questions they had about the procedure.  Following this I explained the Mohs procedure and consent was obtained. The risks, benefits and alternatives to therapy were discussed in detail. Specifically, the risks of infection, scarring, bleeding, prolonged wound healing, incomplete removal, allergy to anesthesia, nerve injury and recurrence were addressed. Prior to the procedure, the treatment site was clearly identified and confirmed by the patient. All components of Universal Protocol/PAUSE Rule completed.
Consent (Temporal Branch)/Introductory Paragraph: The rationale for Mohs was explained to the patient and consent was obtained. The risks, benefits and alternatives to therapy were discussed in detail. Specifically, the risks of damage to the temporal branch of the facial nerve, infection, scarring, bleeding, prolonged wound healing, incomplete removal, allergy to anesthesia, and recurrence were addressed. Prior to the procedure, the treatment site was clearly identified and confirmed by the patient. All components of Universal Protocol/PAUSE Rule completed.
Consent (Marginal Mandibular)/Introductory Paragraph: The rationale for Mohs was explained to the patient and consent was obtained. The risks, benefits and alternatives to therapy were discussed in detail. Specifically, the risks of damage to the marginal mandibular branch of the facial nerve, infection, scarring, bleeding, prolonged wound healing, incomplete removal, allergy to anesthesia, and recurrence were addressed. Prior to the procedure, the treatment site was clearly identified and confirmed by the patient. All components of Universal Protocol/PAUSE Rule completed.
Consent (Spinal Accessory)/Introductory Paragraph: The rationale for Mohs was explained to the patient and consent was obtained. The risks, benefits and alternatives to therapy were discussed in detail. Specifically, the risks of damage to the spinal accessory nerve, infection, scarring, bleeding, prolonged wound healing, incomplete removal, allergy to anesthesia, and recurrence were addressed. Prior to the procedure, the treatment site was clearly identified and confirmed by the patient. All components of Universal Protocol/PAUSE Rule completed.
Consent (Near Eyelid Margin)/Introductory Paragraph: The rationale for Mohs was explained to the patient and consent was obtained. The risks, benefits and alternatives to therapy were discussed in detail. Specifically, the risks of ectropion or eyelid deformity, infection, scarring, bleeding, prolonged wound healing, incomplete removal, allergy to anesthesia, nerve injury and recurrence were addressed. Prior to the procedure, the treatment site was clearly identified and confirmed by the patient. All components of Universal Protocol/PAUSE Rule completed.
Consent (Ear)/Introductory Paragraph: The rationale for Mohs was explained to the patient and consent was obtained. The risks, benefits and alternatives to therapy were discussed in detail. Specifically, the risks of ear deformity, infection, scarring, bleeding, prolonged wound healing, incomplete removal, allergy to anesthesia, nerve injury and recurrence were addressed. Prior to the procedure, the treatment site was clearly identified and confirmed by the patient. All components of Universal Protocol/PAUSE Rule completed.
Consent (Nose)/Introductory Paragraph: The rationale for Mohs was explained to the patient and consent was obtained. The risks, benefits and alternatives to therapy were discussed in detail. Specifically, the risks of nasal deformity, changes in the flow of air through the nose, infection, scarring, bleeding, prolonged wound healing, incomplete removal, allergy to anesthesia, nerve injury and recurrence were addressed. Prior to the procedure, the treatment site was clearly identified and confirmed by the patient. All components of Universal Protocol/PAUSE Rule completed.
Consent (Lip)/Introductory Paragraph: The rationale for Mohs was explained to the patient and consent was obtained. The risks, benefits and alternatives to therapy were discussed in detail. Specifically, the risks of lip deformity, changes in the oral aperture, infection, scarring, bleeding, prolonged wound healing, incomplete removal, allergy to anesthesia, nerve injury and recurrence were addressed. Prior to the procedure, the treatment site was clearly identified and confirmed by the patient. All components of Universal Protocol/PAUSE Rule completed.
Consent (Scalp)/Introductory Paragraph: The rationale for Mohs was explained to the patient and consent was obtained. The risks, benefits and alternatives to therapy were discussed in detail. Specifically, the risks of changes in hair growth pattern secondary to repair, infection, scarring, bleeding, prolonged wound healing, incomplete removal, allergy to anesthesia, nerve injury and recurrence were addressed. Prior to the procedure, the treatment site was clearly identified and confirmed by the patient. All components of Universal Protocol/PAUSE Rule completed.
Detail Level: Detailed
Postop Diagnosis: same
Anesthesia Volume In Cc: 6
Additional Anesthesia Volume In Cc: 3
Hemostasis: Electrocautery
Estimated Blood Loss (Cc): minimal
Brow Lift Text: A midfrontal incision was made medially to the defect to allow access to the tissues just superior to the left eyebrow. Following careful dissection inferiorly in a supraperiosteal plane to the level of the left eyebrow, several 3-0 monocryl sutures were used to resuspend the eyebrow orbicularis oculi muscular unit to the superior frontal bone periosteum. This resulted in an appropriate reapproximation of static eyebrow symmetry and correction of the left brow ptosis.
Epidermal Closure: running locked
Suturegard Intro: Intraoperative tissue expansion was performed, utilizing the SUTUREGARD device, in order to reduce wound tension.
Suturegard Body: The suture ends were repeatedly re-tightened and re-clamped to achieve the desired tissue expansion.
Hemigard Intro: Due to skin fragility and wound tension, it was decided to use HEMIGARD adhesive retention suture devices to permit a linear closure. The skin was cleaned and dried for a 6cm distance away from the wound. Excessive hair, if present, was removed to allow for adhesion.
Hemigard Postcare Instructions: The HEMIGARD strips are to remain completely dry for at least 5-7 days.
Donor Site Anesthesia Type: same as repair anesthesia
Graft Donor Site Dermal Sutures (Optional): 5-0 Monocryl
Epidermal Closure Graft Donor Site (Optional): simple interrupted
Graft Donor Site Bandage (Optional-Leave Blank If You Don't Want In Note): Pressure dressing applied to the donor site.
Closure 2 Information: This tab is for additional flaps and grafts, including complex repair and grafts and complex repair and flaps. You can also specify a different location for the additional defect, if the location is the same you do not need to select a new one. We will insert the automated text for the repair you select below just as we do for solitary flaps and grafts. Please note that at this time if you select a location with a different insurance zone you will need to override the ICD10 and CPT if appropriate.
Closure 3 Information: This tab is for additional flaps and grafts above and beyond our usual structured repairs.  Please note if you enter information here it will not currently bill and you will need to add the billing information manually.
Dressing (No Sutures): dry sterile dressing
Unna Boot Text: An Unna boot was placed to help immobilize the limb and facilitate more rapid healing.
Home Suture Removal Text: Patient was provided instructions on removing sutures and will remove their sutures at home.  If they have any questions or difficulties they will call the office.
Post-Care Instructions: MOHS POST-OPERATIVE WOUND CARE\\n\\n1. Leave bandage on for the first 2 days.  Do not get the bandage wet (i.e., submerged in a bath, pool, or sweating).  Please limit activities while your stitches are in.  Stitches can break.  Skin can tear.  If you feel tension on the stitches you must stop that motion/activity.\\n\\n2. A shower may be taken after the first 2 days. Please take sponge baths during the first two days after your surgery, unless instructed otherwise.\\n\\n3. Carefully remove the bandage so as not to irritate the wound. \\n\\n4. Gently cleanse the area with soap and water.\\n\\n5. Apply a thin layer of Vaseline or Bacitracin ointment to the wound.  If another antibiotic has been prescribed, please apply as instructed on the prescription packaging. \\n\\n8. Cut a piece of non-stick gauze or Telfa pad to a size just large enough to cover the surgical site.\\n\\n9. Apply medical tape or hypoallergenic paper tape (for those with allergy to bandage adhesive) to hold your bandage in place.\\n\\n10.  The surgical site should be cleansed and dressed as described above on a daily basis until the day of   \\n   suture removal.  \\n\\n11.  If shaving around the area, leave your bandage on and shave around it.  Shaving over the sutures could \\n allow the wound to open up or increase your infection risk.\\n\\nDO NOT SWIM OR GO IN A HOT TUB UNTIL SUTURES HAVE BEEN REMOVED.\\n \\nADDITIONAL INSTRUCTIONS:\\n\\n1. Bruising and swelling may occur after the surgery and can increase for up to 48 hours.  Do not be alarmed--this will resolve over time.  Applying ice compresses over the bandage will help to decrease swelling and bruising:   ICE: Wrap a clean hand-towel or handkerchief around your ice pack.\\n Apply over the bandage for 15 minutes on and off for 2 hours to help reduce swelling.\\n\\n2. If slight bleeding occurs, apply continuous pressure for 20 minutes.  If bleeding persists or is severe, call the office.  If the office is closed, call 911 or go to the nearest emergency room.\\n\\n3. If the wound becomes hot or extremely tender to touch or it is bright red around the wound, extending 1 inch beyond the wound edge, please contact us at [phone].  If after hours, call [phone].\\n\\n4. Some oozing from the wound is normal.\\n\\n5. If healing without sutures:  Signs of healing may not be apparent for 2-3 weeks and the wound may take 6-8 weeks or longer to completely heal.  Clean the area with soap and water daily. Apply Vaseline daily or prescription ointment as directed.
Pain Refusal Text: I offered to prescribe pain medication but the patient refused to take this medication.
Mauc Instructions: By selecting yes to the question below the MAUC number will be added into the note.  This will be calculated automatically based on the diagnosis chosen, the size entered, the body zone selected (H,M,L) and the specific indications you chose. You will also have the option to override the Mohs AUC if you disagree with the automatically calculated number and this option is found in the Case Summary tab.
Where Do You Want The Question To Include Opioid Counseling Located?: Case Summary Tab
Eye Protection Verbiage: Before proceeding with the stage, a plastic scleral shield was inserted. The globe was anesthetized with a few drops of 1% lidocaine with 1:100,000 epinephrine. Then, an appropriate sized scleral shield was chosen and coated with lacrilube ointment. The shield was gently inserted and left in place for the duration of each stage. After the stage was completed, the shield was gently removed.
Mohs Method Verbiage: An incision at a 45 degree angle following the standard Mohs approach was done and the specimen was harvested as a microscopic controlled layer.
Surgeon/Pathologist Verbiage (Will Incorporate Name Of Surgeon From Intro If Not Blank): operated in two distinct and integrated capacities as the surgeon and pathologist.
Mohs Histo Method Verbiage: Each section was then chromacoded and processed in the Mohs lab using the Mohs protocol and submitted for frozen section.
Subsequent Stages Histo Method Verbiage: Using a similar technique to that described above, a thin layer of tissue was removed from all areas where tumor was visible on the previous stage.  The tissue was again oriented, mapped, dyed, and processed as above.
Mohs Rapid Report Verbiage: The area of clinically evident tumor was marked with skin marking ink and appropriately hatched.  The initial incision was made following the Mohs approach through the skin.  The specimen was taken to the lab, divided into the necessary number of pieces, chromacoded and processed according to the Mohs protocol.  This was repeated in successive stages until a tumor free defect was achieved.
Complex Repair Preamble Text (Leave Blank If You Do Not Want): Extensive wide undermining was performed.
Intermediate Repair Preamble Text (Leave Blank If You Do Not Want): Undermining was performed with blunt dissection.
Graft Cartilage Fenestration Text: The cartilage was fenestrated with a 2mm punch biopsy to help facilitate graft survival and healing.
Non-Graft Cartilage Fenestration Text: The cartilage was fenestrated with a 2mm punch biopsy to help facilitate healing.
Secondary Intention Text (Leave Blank If You Do Not Want): The defect will heal with secondary intention.
No Repair - Repaired With Adjacent Surgical Defect Text (Leave Blank If You Do Not Want): After obtaining clear surgical margins the defect was repaired concurrently with another surgical defect which was in close approximation.
Adjacent Tissue Transfer Text: The defect edges were debeveled with a #15 scalpel blade. Given the location of the defect and the proximity to free margins an adjacent tissue transfer was deemed most appropriate. Using a sterile surgical marker, an appropriate flap was drawn incorporating the defect and placing the expected incisions within the relaxed skin tension lines where possible. The area thus outlined was incised deep to adipose tissue with a #15 scalpel blade. The skin margins were undermined to an appropriate distance in all directions utilizing iris scissors and carried over to close the primary defect.
Advancement Flap (Single) Text: The defect edges were debeveled with a #15 scalpel blade.  Given the location of the defect and the proximity to free margins a single advancement flap was deemed most appropriate.  Using a sterile surgical marker, an appropriate advancement flap was drawn incorporating the defect and placing the expected incisions within the relaxed skin tension lines where possible.    The area thus outlined was incised deep to adipose tissue with a #15 scalpel blade.  The skin margins were undermined to an appropriate distance in all directions utilizing iris scissors.
Advancement Flap (Double) Text: The defect edges were debeveled with a #15 scalpel blade.  Given the location of the defect and the proximity to free margins a double advancement flap was deemed most appropriate.  Using a sterile surgical marker, the appropriate advancement flaps were drawn incorporating the defect and placing the expected incisions within the relaxed skin tension lines where possible.    The area thus outlined was incised deep to adipose tissue with a #15 scalpel blade.  The skin margins were undermined to an appropriate distance in all directions utilizing iris scissors.
Advancement-Rotation Flap Text: The defect edges were debeveled with a #15 scalpel blade.  Given the location of the defect, shape of the defect and the proximity to free margins an advancement-rotation flap was deemed most appropriate.  Using a sterile surgical marker, an appropriate flap was drawn incorporating the defect and placing the expected incisions within the relaxed skin tension lines where possible. The area thus outlined was incised deep to adipose tissue with a #15 scalpel blade.  The skin margins were undermined to an appropriate distance in all directions utilizing iris scissors.
Alar Island Pedicle Flap Text: The defect edges were debeveled with a #15 scalpel blade.  Given the location of the defect, shape of the defect and the proximity to the alar rim an island pedicle advancement flap was deemed most appropriate.  Using a sterile surgical marker, an appropriate advancement flap was drawn incorporating the defect, outlining the appropriate donor tissue and placing the expected incisions within the nasal ala running parallel to the alar rim. The area thus outlined was incised with a #15 scalpel blade.  The skin margins were undermined minimally to an appropriate distance in all directions around the primary defect and laterally outward around the island pedicle utilizing iris scissors.  There was minimal undermining beneath the pedicle flap.
A-T Advancement Flap Text: The defect edges were debeveled with a #15 scalpel blade.  Given the location of the defect, shape of the defect and the proximity to free margins an A-T advancement flap was deemed most appropriate.  Using a sterile surgical marker, an appropriate advancement flap was drawn incorporating the defect and placing the expected incisions within the relaxed skin tension lines where possible.    The area thus outlined was incised deep to adipose tissue with a #15 scalpel blade.  The skin margins were undermined to an appropriate distance in all directions utilizing iris scissors.
Banner Transposition Flap Text: The defect edges were debeveled with a #15 scalpel blade. Given the location of the defect and the proximity to free margins a Banner transposition flap was deemed most appropriate. Using a sterile surgical marker, an appropriate flap was drawn around the defect. The area thus outlined was incised deep to adipose tissue with a #15 scalpel blade. The skin margins were undermined to an appropriate distance in all directions utilizing iris scissors. Following this, the designed flap was carried into the primary defect and sutured into place.
Bilateral Helical Rim Advancement Flap Text: The defect edges were debeveled with a #15 blade scalpel.  Given the location of the defect and the proximity to free margins (helical rim) a bilateral helical rim advancement flap was deemed most appropriate.  Using a sterile surgical marker, the appropriate advancement flaps were drawn incorporating the defect and placing the expected incisions between the helical rim and antihelix where possible.  The area thus outlined was incised through and through with a #15 scalpel blade.  With a skin hook and iris scissors, the flaps were gently and sharply undermined and freed up.
Bilateral Rotation Flap Text: The defect edges were debeveled with a #15 scalpel blade. Given the location of the defect, shape of the defect and the proximity to free margins a bilateral rotation flap was deemed most appropriate. Using a sterile surgical marker, an appropriate rotation flap was drawn incorporating the defect and placing the expected incisions within the relaxed skin tension lines where possible. The area thus outlined was incised deep to adipose tissue with a #15 scalpel blade. The skin margins were undermined to an appropriate distance in all directions utilizing iris scissors. Following this, the designed flap was carried over into the primary defect and sutured into place.
Bilobed Flap Text: The defect edges were debeveled with a #15 scalpel blade.  Given the location of the defect and the proximity to free margins a bilobe flap was deemed most appropriate.  Using a sterile surgical marker, an appropriate bilobe flap drawn around the defect.    The area thus outlined was incised deep to adipose tissue with a #15 scalpel blade.  The skin margins were undermined to an appropriate distance in all directions utilizing iris scissors.
Bilobed Transposition Flap Text: The defect edges were debeveled with a #15 scalpel blade.  Given the location of the defect and the proximity to free margins a bilobed transposition flap was deemed most appropriate.  Using a sterile surgical marker, an appropriate bilobe flap drawn around the defect.    The area thus outlined was incised deep to adipose tissue with a #15 scalpel blade.  The skin margins were undermined to an appropriate distance in all directions utilizing iris scissors.
Bi-Rhombic Flap Text: The defect edges were debeveled with a #15 scalpel blade.  Given the location of the defect and the proximity to free margins a bi-rhombic flap was deemed most appropriate.  Using a sterile surgical marker, an appropriate rhombic flap was drawn incorporating the defect. The area thus outlined was incised deep to adipose tissue with a #15 scalpel blade.  The skin margins were undermined to an appropriate distance in all directions utilizing iris scissors.
Burow's Advancement Flap Text: The defect edges were debeveled with a #15 scalpel blade.  Given the location of the defect and the proximity to free margins a Burow's advancement flap was deemed most appropriate.  Using a sterile surgical marker, the appropriate advancement flap was drawn incorporating the defect and placing the expected incisions within the relaxed skin tension lines where possible.    The area thus outlined was incised deep to adipose tissue with a #15 scalpel blade.  The skin margins were undermined to an appropriate distance in all directions utilizing iris scissors.
Chonodrocutaneous Helical Advancement Flap Text: The defect edges were debeveled with a #15 scalpel blade. Given the location of the defect and the proximity to free margins a chondrocutaneous helical advancement flap was deemed most appropriate. Using a sterile surgical marker, the appropriate advancement flap was drawn incorporating the defect and placing the expected incisions within the relaxed skin tension lines where possible. The area thus outlined was incised deep to adipose tissue with a #15 scalpel blade. The skin margins were undermined to an appropriate distance in all directions utilizing iris scissors. Following this, the designed flap was advanced and carried over into the primary defect and sutured into place.
Crescentic Advancement Flap Text: The defect edges were debeveled with a #15 scalpel blade.  Given the location of the defect and the proximity to free margins a crescentic advancement flap was deemed most appropriate.  Using a sterile surgical marker, the appropriate advancement flap was drawn incorporating the defect and placing the expected incisions within the relaxed skin tension lines where possible.    The area thus outlined was incised deep to adipose tissue with a #15 scalpel blade.  The skin margins were undermined to an appropriate distance in all directions utilizing iris scissors.
Dorsal Nasal Flap Text: The defect edges were debeveled with a #15 scalpel blade.  Given the location of the defect and the proximity to free margins a dorsal nasal flap was deemed most appropriate.  Using a sterile surgical marker, an appropriate dorsal nasal flap was drawn around the defect.    The area thus outlined was incised deep to adipose tissue with a #15 scalpel blade.  The skin margins were undermined to an appropriate distance in all directions utilizing iris scissors.
Double Island Pedicle Flap Text: The defect edges were debeveled with a #15 scalpel blade.  Given the location of the defect, shape of the defect and the proximity to free margins a double island pedicle advancement flap was deemed most appropriate.  Using a sterile surgical marker, an appropriate advancement flap was drawn incorporating the defect, outlining the appropriate donor tissue and placing the expected incisions within the relaxed skin tension lines where possible.    The area thus outlined was incised deep to adipose tissue with a #15 scalpel blade.  The skin margins were undermined to an appropriate distance in all directions around the primary defect and laterally outward around the island pedicle utilizing iris scissors.  There was minimal undermining beneath the pedicle flap.
Double O-Z Flap Text: The defect edges were debeveled with a #15 scalpel blade. Given the location of the defect, shape of the defect and the proximity to free margins a Double O-Z flap was deemed most appropriate. Using a sterile surgical marker, an appropriate transposition flap was drawn incorporating the defect and placing the expected incisions within the relaxed skin tension lines where possible. The area thus outlined was incised deep to adipose tissue with a #15 scalpel blade. The skin margins were undermined to an appropriate distance in all directions utilizing iris scissors. Following this, the designed flap was carried over into the primary defect and sutured into place.
Double O-Z Plasty Text: The defect edges were debeveled with a #15 scalpel blade. Given the location of the defect, shape of the defect and the proximity to free margins a Double O-Z plasty (double transposition flap) was deemed most appropriate. Using a sterile surgical marker, the appropriate transposition flaps were drawn incorporating the defect and placing the expected incisions within the relaxed skin tension lines where possible. The area thus outlined was incised deep to adipose tissue with a #15 scalpel blade. The skin margins were undermined to an appropriate distance in all directions utilizing iris scissors. Hemostasis was achieved with electrocautery. The flaps were then transposed and carried over into place, one clockwise and the other counterclockwise, and anchored with interrupted buried subcutaneous sutures.
Double Z Plasty Text: The lesion was extirpated to the level of the fat with a #15 scalpel blade. Given the location of the defect, shape of the defect and the proximity to free margins a double Z-plasty was deemed most appropriate for repair. Using a sterile surgical marker, the appropriate transposition arms of the double Z-plasty were drawn incorporating the defect and placing the expected incisions within the relaxed skin tension lines where possible. The area thus outlined was incised deep to adipose tissue with a #15 scalpel blade. The skin margins were undermined to an appropriate distance in all directions utilizing iris scissors. The opposing transposition arms were then transposed and carried over into place in opposite direction and anchored with interrupted buried subcutaneous sutures.
Ear Star Wedge Flap Text: The defect edges were debeveled with a #15 blade scalpel.  Given the location of the defect and the proximity to free margins (helical rim) an ear star wedge flap was deemed most appropriate.  Using a sterile surgical marker, the appropriate flap was drawn incorporating the defect and placing the expected incisions between the helical rim and antihelix where possible.  The area thus outlined was incised through and through with a #15 scalpel blade.
Flip-Flop Flap Text: The defect edges were debeveled with a #15 blade scalpel.  Given the location of the defect and the proximity to free margins a flip-flop flap was deemed most appropriate. Using a sterile surgical marker, the appropriate flap was drawn incorporating the defect and placing the expected incisions between the helical rim and antihelix where possible.  The area thus outlined was incised through and through with a #15 scalpel blade. Following this, the designed flap was carried over into the primary defect and sutured into place.
Hatchet Flap Text: The defect edges were debeveled with a #15 scalpel blade.  Given the location of the defect, shape of the defect and the proximity to free margins a hatchet flap was deemed most appropriate.  Using a sterile surgical marker, an appropriate hatchet flap was drawn incorporating the defect and placing the expected incisions within the relaxed skin tension lines where possible.    The area thus outlined was incised deep to adipose tissue with a #15 scalpel blade.  The skin margins were undermined to an appropriate distance in all directions utilizing iris scissors.
Helical Rim Advancement Flap Text: The defect edges were debeveled with a #15 blade scalpel.  Given the location of the defect and the proximity to free margins (helical rim) a double helical rim advancement flap was deemed most appropriate.  Using a sterile surgical marker, the appropriate advancement flaps were drawn incorporating the defect and placing the expected incisions between the helical rim and antihelix where possible.  The area thus outlined was incised through and through with a #15 scalpel blade.  With a skin hook and iris scissors, the flaps were gently and sharply undermined and freed up.
H Plasty Text: Given the location of the defect, shape of the defect and the proximity to free margins a H-plasty was deemed most appropriate for repair.  Using a sterile surgical marker, the appropriate advancement arms of the H-plasty were drawn incorporating the defect and placing the expected incisions within the relaxed skin tension lines where possible. The area thus outlined was incised deep to adipose tissue with a #15 scalpel blade. The skin margins were undermined to an appropriate distance in all directions utilizing iris scissors.  The opposing advancement arms were then advanced into place in opposite direction and anchored with interrupted buried subcutaneous sutures.
Island Pedicle Flap Text: The defect edges were debeveled with a #15 scalpel blade.  Given the location of the defect, shape of the defect and the proximity to free margins an island pedicle advancement flap was deemed most appropriate.  Using a sterile surgical marker, an appropriate advancement flap was drawn incorporating the defect, outlining the appropriate donor tissue and placing the expected incisions within the relaxed skin tension lines where possible.    The area thus outlined was incised deep to adipose tissue with a #15 scalpel blade.  The skin margins were undermined to an appropriate distance in all directions around the primary defect and laterally outward around the island pedicle utilizing iris scissors.  There was minimal undermining beneath the pedicle flap.
Island Pedicle Flap With Canthal Suspension Text: The defect edges were debeveled with a #15 scalpel blade.  Given the location of the defect, shape of the defect and the proximity to free margins an island pedicle advancement flap was deemed most appropriate.  Using a sterile surgical marker, an appropriate advancement flap was drawn incorporating the defect, outlining the appropriate donor tissue and placing the expected incisions within the relaxed skin tension lines where possible. The area thus outlined was incised deep to adipose tissue with a #15 scalpel blade.  The skin margins were undermined to an appropriate distance in all directions around the primary defect and laterally outward around the island pedicle utilizing iris scissors.  There was minimal undermining beneath the pedicle flap. A suspension suture was placed in the canthal tendon to prevent tension and prevent ectropion.
Island Pedicle Flap-Requiring Vessel Identification Text: The defect edges were debeveled with a #15 scalpel blade.  Given the location of the defect, shape of the defect and the proximity to free margins an island pedicle advancement flap was deemed most appropriate.  Using a sterile surgical marker, an appropriate advancement flap was drawn, based on the axial vessel mentioned above, incorporating the defect, outlining the appropriate donor tissue and placing the expected incisions within the relaxed skin tension lines where possible.    The area thus outlined was incised deep to adipose tissue with a #15 scalpel blade.  The skin margins were undermined to an appropriate distance in all directions around the primary defect and laterally outward around the island pedicle utilizing iris scissors.  There was minimal undermining beneath the pedicle flap.
Keystone Flap Text: The defect edges were debeveled with a #15 scalpel blade.  Given the location of the defect, shape of the defect a keystone flap was deemed most appropriate.  Using a sterile surgical marker, an appropriate keystone flap was drawn incorporating the defect, outlining the appropriate donor tissue and placing the expected incisions within the relaxed skin tension lines where possible. The area thus outlined was incised deep to adipose tissue with a #15 scalpel blade.  The skin margins were undermined to an appropriate distance in all directions around the primary defect and laterally outward around the flap utilizing iris scissors.
Melolabial Transposition Flap Text: The defect edges were debeveled with a #15 scalpel blade.  Given the location of the defect and the proximity to free margins a melolabial flap was deemed most appropriate.  Using a sterile surgical marker, an appropriate melolabial transposition flap was drawn incorporating the defect.    The area thus outlined was incised deep to adipose tissue with a #15 scalpel blade.  The skin margins were undermined to an appropriate distance in all directions utilizing iris scissors.
Mercedes Flap Text: The defect edges were debeveled with a #15 scalpel blade. Given the location of the defect, shape of the defect and the proximity to free margins a Mercedes flap was deemed most appropriate. Using a sterile surgical marker, an appropriate advancement flap was drawn incorporating the defect and placing the expected incisions within the relaxed skin tension lines where possible. The area thus outlined was incised deep to adipose tissue with a #15 scalpel blade. The skin margins were undermined to an appropriate distance in all directions utilizing iris scissors. Following this, the designed flap was advanced and carried over into the primary defect and sutured into place.
Modified Advancement Flap Text: The defect edges were debeveled with a #15 scalpel blade.  Given the location of the defect, shape of the defect and the proximity to free margins a modified advancement flap was deemed most appropriate.  Using a sterile surgical marker, an appropriate advancement flap was drawn incorporating the defect and placing the expected incisions within the relaxed skin tension lines where possible.    The area thus outlined was incised deep to adipose tissue with a #15 scalpel blade.  The skin margins were undermined to an appropriate distance in all directions utilizing iris scissors.
Mucosal Advancement Flap Text: Given the location of the defect, shape of the defect and the proximity to free margins a mucosal advancement flap was deemed most appropriate. Incisions were made with a 15 blade scalpel in the appropriate fashion along the cutaneous vermilion border and the mucosal lip. The remaining actinically damaged mucosal tissue was excised.  The mucosal advancement flap was then elevated to the gingival sulcus with care taken to preserve the neurovascular structures and advanced into the primary defect. Care was taken to ensure that precise realignment of the vermilion border was achieved.
Muscle Hinge Flap Text: The defect edges were debeveled with a #15 scalpel blade.  Given the size, depth and location of the defect and the proximity to free margins a muscle hinge flap was deemed most appropriate.  Using a sterile surgical marker, an appropriate hinge flap was drawn incorporating the defect. The area thus outlined was incised with a #15 scalpel blade.  The skin margins were undermined to an appropriate distance in all directions utilizing iris scissors.
Mustarde Flap Text: The defect edges were debeveled with a #15 scalpel blade.  Given the size, depth and location of the defect and the proximity to free margins a Mustarde flap was deemed most appropriate. Using a sterile surgical marker, an appropriate flap was drawn incorporating the defect. The area thus outlined was incised with a #15 scalpel blade. The skin margins were undermined to an appropriate distance in all directions utilizing iris scissors. Following this, the designed flap was carried into the primary defect and sutured into place.
Nasal Turnover Hinge Flap Text: The defect edges were debeveled with a #15 scalpel blade.  Given the size, depth, location of the defect and the defect being full thickness a nasal turnover hinge flap was deemed most appropriate. Using a sterile surgical marker, an appropriate hinge flap was drawn incorporating the defect. The area thus outlined was incised with a #15 scalpel blade. The flap was designed to recreate the nasal mucosal lining and the alar rim. The skin margins were undermined to an appropriate distance in all directions utilizing iris scissors. Following this, the designed flap was carried over into the primary defect and sutured into place
Nasalis-Muscle-Based Myocutaneous Island Pedicle Flap Text: Using a #15 blade, an incision was made around the donor flap to the level of the nasalis muscle. Wide lateral undermining was then performed in both the subcutaneous plane above the nasalis muscle, and in a submuscular plane just above periosteum. This allowed the formation of a free nasalis muscle axial pedicle (based on the angular artery) which was still attached to the actual cutaneous flap, increasing its mobility and vascular viability. Hemostasis was obtained with pinpoint electrocoagulation. The flap was mobilized into position and the pivotal anchor points positioned and stabilized with buried interrupted sutures. Subcutaneous and dermal tissues were closed in a multilayered fashion with sutures. Tissue redundancies were excised, and the epidermal edges were apposed without significant tension and sutured with sutures.
Nasalis Myocutaneous Flap Text: Using a #15 blade, an incision was made around the donor flap to the level of the nasalis muscle. Wide lateral undermining was then performed in both the subcutaneous plane above the nasalis muscle, and in a submuscular plane just above periosteum. This allowed the formation of a free nasalis muscle axial pedicle which was still attached to the actual cutaneous flap, increasing its mobility and vascular viability. Hemostasis was obtained with pinpoint electrocoagulation. The flap was mobilized into position and the pivotal anchor points positioned and stabilized with buried interrupted sutures. Subcutaneous and dermal tissues were closed in a multilayered fashion with sutures. Tissue redundancies were excised, and the epidermal edges were apposed without significant tension and sutured with sutures.
Nasolabial Transposition Flap Text: The defect edges were debeveled with a #15 scalpel blade.  Given the size, depth and location of the defect and the proximity to free margins a nasolabial transposition flap was deemed most appropriate. Using a sterile surgical marker, an appropriate flap was drawn incorporating the defect. The area thus outlined was incised with a #15 scalpel blade. The skin margins were undermined to an appropriate distance in all directions utilizing iris scissors. Following this, the designed flap was carried into the primary defect and sutured into place.
Orbicularis Oris Muscle Flap Text: The defect edges were debeveled with a #15 scalpel blade.  Given that the defect affected the competency of the oral sphincter an orbicularis oris muscle flap was deemed most appropriate to restore this competency and normal muscle function.  Using a sterile surgical marker, an appropriate flap was drawn incorporating the defect. The area thus outlined was incised with a #15 scalpel blade. Following this, the designed flap was carried over into the primary defect and sutured into place.
O-T Advancement Flap Text: The defect edges were debeveled with a #15 scalpel blade.  Given the location of the defect, shape of the defect and the proximity to free margins an O-T advancement flap was deemed most appropriate.  Using a sterile surgical marker, an appropriate advancement flap was drawn incorporating the defect and placing the expected incisions within the relaxed skin tension lines where possible.    The area thus outlined was incised deep to adipose tissue with a #15 scalpel blade.  The skin margins were undermined to an appropriate distance in all directions utilizing iris scissors.
O-T Plasty Text: The defect edges were debeveled with a #15 scalpel blade.  Given the location of the defect, shape of the defect and the proximity to free margins an O-T plasty was deemed most appropriate.  Using a sterile surgical marker, an appropriate O-T plasty was drawn incorporating the defect and placing the expected incisions within the relaxed skin tension lines where possible.    The area thus outlined was incised deep to adipose tissue with a #15 scalpel blade.  The skin margins were undermined to an appropriate distance in all directions utilizing iris scissors.
O-L Flap Text: The defect edges were debeveled with a #15 scalpel blade.  Given the location of the defect, shape of the defect and the proximity to free margins an O-L flap was deemed most appropriate.  Using a sterile surgical marker, an appropriate advancement flap was drawn incorporating the defect and placing the expected incisions within the relaxed skin tension lines where possible.    The area thus outlined was incised deep to adipose tissue with a #15 scalpel blade.  The skin margins were undermined to an appropriate distance in all directions utilizing iris scissors.
O-Z Flap Text: The defect edges were debeveled with a #15 scalpel blade. Given the location of the defect, shape of the defect and the proximity to free margins an O-Z flap was deemed most appropriate. Using a sterile surgical marker, an appropriate transposition flap was drawn incorporating the defect and placing the expected incisions within the relaxed skin tension lines where possible. The area thus outlined was incised deep to adipose tissue with a #15 scalpel blade. The skin margins were undermined to an appropriate distance in all directions utilizing iris scissors. Following this, the designed flap was carried over into the primary defect and sutured into place.
O-Z Plasty Text: The defect edges were debeveled with a #15 scalpel blade.  Given the location of the defect, shape of the defect and the proximity to free margins an O-Z plasty (double transposition flap) was deemed most appropriate.  Using a sterile surgical marker, the appropriate transposition flaps were drawn incorporating the defect and placing the expected incisions within the relaxed skin tension lines where possible.    The area thus outlined was incised deep to adipose tissue with a #15 scalpel blade.  The skin margins were undermined to an appropriate distance in all directions utilizing iris scissors.  Hemostasis was achieved with electrocautery.  The flaps were then transposed into place, one clockwise and the other counterclockwise, and anchored with interrupted buried subcutaneous sutures.
Peng Advancement Flap Text: The defect edges were debeveled with a #15 scalpel blade. Given the location of the defect, shape of the defect and the proximity to free margins a Peng advancement flap was deemed most appropriate. Using a sterile surgical marker, an appropriate advancement flap was drawn incorporating the defect and placing the expected incisions within the relaxed skin tension lines where possible. The area thus outlined was incised deep to adipose tissue with a #15 scalpel blade. The skin margins were undermined to an appropriate distance in all directions utilizing iris scissors. Following this, the designed flap was advanced and carried over into the primary defect and sutured into place.
Rectangular Flap Text: The defect edges were debeveled with a #15 scalpel blade. Given the location of the defect and the proximity to free margins a rectangular flap was deemed most appropriate. Using a sterile surgical marker, an appropriate rectangular flap was drawn incorporating the defect. The area thus outlined was incised deep to adipose tissue with a #15 scalpel blade. The skin margins were undermined to an appropriate distance in all directions utilizing iris scissors. Following this, the designed flap was carried over into the primary defect and sutured into place.
Rhombic Flap Text: The defect edges were debeveled with a #15 scalpel blade.  Given the location of the defect and the proximity to free margins a rhombic flap was deemed most appropriate.  Using a sterile surgical marker, an appropriate rhombic flap was drawn incorporating the defect.    The area thus outlined was incised deep to adipose tissue with a #15 scalpel blade.  The skin margins were undermined to an appropriate distance in all directions utilizing iris scissors.
Rhomboid Transposition Flap Text: The defect edges were debeveled with a #15 scalpel blade. Given the location of the defect and the proximity to free margins a rhomboid transposition flap was deemed most appropriate. Using a sterile surgical marker, an appropriate rhomboid flap was drawn incorporating the defect. The area thus outlined was incised deep to adipose tissue with a #15 scalpel blade. The skin margins were undermined to an appropriate distance in all directions utilizing iris scissors. Following this, the designed flap was carried over into the primary defect and sutured into place.
Rotation Flap Text: The defect edges were debeveled with a #15 scalpel blade.  Given the location of the defect, shape of the defect and the proximity to free margins a rotation flap was deemed most appropriate.  Using a sterile surgical marker, an appropriate rotation flap was drawn incorporating the defect and placing the expected incisions within the relaxed skin tension lines where possible.    The area thus outlined was incised deep to adipose tissue with a #15 scalpel blade.  The skin margins were undermined to an appropriate distance in all directions utilizing iris scissors.
Spiral Flap Text: The defect edges were debeveled with a #15 scalpel blade.  Given the location of the defect, shape of the defect and the proximity to free margins a spiral flap was deemed most appropriate.  Using a sterile surgical marker, an appropriate rotation flap was drawn incorporating the defect and placing the expected incisions within the relaxed skin tension lines where possible. The area thus outlined was incised deep to adipose tissue with a #15 scalpel blade.  The skin margins were undermined to an appropriate distance in all directions utilizing iris scissors.
Staged Advancement Flap Text: The defect edges were debeveled with a #15 scalpel blade. Given the location of the defect, shape of the defect and the proximity to free margins a staged advancement flap was deemed most appropriate. Using a sterile surgical marker, an appropriate advancement flap was drawn incorporating the defect and placing the expected incisions within the relaxed skin tension lines where possible. The area thus outlined was incised deep to adipose tissue with a #15 scalpel blade. The skin margins were undermined to an appropriate distance in all directions utilizing iris scissors. Following this, the designed flap was carried over into the primary defect and sutured into place.
Star Wedge Flap Text: The defect edges were debeveled with a #15 scalpel blade.  Given the location of the defect, shape of the defect and the proximity to free margins a star wedge flap was deemed most appropriate.  Using a sterile surgical marker, an appropriate rotation flap was drawn incorporating the defect and placing the expected incisions within the relaxed skin tension lines where possible. The area thus outlined was incised deep to adipose tissue with a #15 scalpel blade.  The skin margins were undermined to an appropriate distance in all directions utilizing iris scissors.
Transposition Flap Text: The defect edges were debeveled with a #15 scalpel blade.  Given the location of the defect and the proximity to free margins a transposition flap was deemed most appropriate.  Using a sterile surgical marker, an appropriate transposition flap was drawn incorporating the defect.    The area thus outlined was incised deep to adipose tissue with a #15 scalpel blade.  The skin margins were undermined to an appropriate distance in all directions utilizing iris scissors.
Trilobed Flap Text: The defect edges were debeveled with a #15 scalpel blade.  Given the location of the defect and the proximity to free margins a trilobed flap was deemed most appropriate.  Using a sterile surgical marker, an appropriate trilobed flap drawn around the defect.    The area thus outlined was incised deep to adipose tissue with a #15 scalpel blade.  The skin margins were undermined to an appropriate distance in all directions utilizing iris scissors.
V-Y Flap Text: The defect edges were debeveled with a #15 scalpel blade.  Given the location of the defect, shape of the defect and the proximity to free margins a V-Y flap was deemed most appropriate.  Using a sterile surgical marker, an appropriate advancement flap was drawn incorporating the defect and placing the expected incisions within the relaxed skin tension lines where possible.    The area thus outlined was incised deep to adipose tissue with a #15 scalpel blade.  The skin margins were undermined to an appropriate distance in all directions utilizing iris scissors.
V-Y Plasty Text: The defect edges were debeveled with a #15 scalpel blade.  Given the location of the defect, shape of the defect and the proximity to free margins an V-Y advancement flap was deemed most appropriate.  Using a sterile surgical marker, an appropriate advancement flap was drawn incorporating the defect and placing the expected incisions within the relaxed skin tension lines where possible.    The area thus outlined was incised deep to adipose tissue with a #15 scalpel blade.  The skin margins were undermined to an appropriate distance in all directions utilizing iris scissors.
W Plasty Text: The lesion was extirpated to the level of the fat with a #15 scalpel blade.  Given the location of the defect, shape of the defect and the proximity to free margins a W-plasty was deemed most appropriate for repair.  Using a sterile surgical marker, the appropriate transposition arms of the W-plasty were drawn incorporating the defect and placing the expected incisions within the relaxed skin tension lines where possible.    The area thus outlined was incised deep to adipose tissue with a #15 scalpel blade.  The skin margins were undermined to an appropriate distance in all directions utilizing iris scissors.  The opposing transposition arms were then transposed into place in opposite direction and anchored with interrupted buried subcutaneous sutures.
Z Plasty Text: The lesion was extirpated to the level of the fat with a #15 scalpel blade.  Given the location of the defect, shape of the defect and the proximity to free margins a Z-plasty was deemed most appropriate for repair.  Using a sterile surgical marker, the appropriate transposition arms of the Z-plasty were drawn incorporating the defect and placing the expected incisions within the relaxed skin tension lines where possible.    The area thus outlined was incised deep to adipose tissue with a #15 scalpel blade.  The skin margins were undermined to an appropriate distance in all directions utilizing iris scissors.  The opposing transposition arms were then transposed into place in opposite direction and anchored with interrupted buried subcutaneous sutures.
Zygomaticofacial Flap Text: Given the location of the defect, shape of the defect and the proximity to free margins a zygomaticofacial flap was deemed most appropriate for repair. Using a sterile surgical marker, the appropriate flap was drawn incorporating the defect and placing the expected incisions within the relaxed skin tension lines where possible. The area thus outlined was incised deep to adipose tissue with a #15 scalpel blade with preservation of a vascular pedicle.  The skin margins were undermined to an appropriate distance in all directions utilizing iris scissors. The flap was then carried over into the defect and anchored with interrupted buried subcutaneous sutures.
Abbe Flap (Lower To Upper Lip) Text: The defect of the upper lip was assessed and measured.  Given the location and size of the defect, an Abbe flap was deemed most appropriate. Using a sterile surgical marker, an appropriate Abbe flap was measured and drawn on the lower lip. Local anesthesia was then infiltrated. A scalpel was then used to incise the upper lip through and through the skin, vermilion, muscle and mucosa, leaving the flap pedicled on the opposite side.  The flap was then rotated and transferred to the lower lip defect.  The flap was then sutured into place with a three layer technique, closing the orbicularis oris muscle layer with subcutaneous buried sutures, followed by a mucosal layer and an epidermal layer.
Abbe Flap (Upper To Lower Lip) Text: The defect of the lower lip was assessed and measured.  Given the location and size of the defect, an Abbe flap was deemed most appropriate. Using a sterile surgical marker, an appropriate Abbe flap was measured and drawn on the upper lip. Local anesthesia was then infiltrated.  A scalpel was then used to incise the upper lip through and through the skin, vermilion, muscle and mucosa, leaving the flap pedicled on the opposite side.  The flap was then rotated and transferred to the lower lip defect.  The flap was then sutured into place with a three layer technique, closing the orbicularis oris muscle layer with subcutaneous buried sutures, followed by a mucosal layer and an epidermal layer.
Cheek Interpolation Flap Text: A decision was made to reconstruct the defect utilizing an interpolation axial flap and a staged reconstruction.  A telfa template was made of the defect.  This telfa template was then used to outline the Cheek Interpolation flap.  The donor area for the pedicle flap was then injected with anesthesia.  The flap was excised through the skin and subcutaneous tissue down to the layer of the underlying musculature.  The interpolation flap was carefully excised within this deep plane to maintain its blood supply.  The edges of the donor site were undermined.   The donor site was closed in a primary fashion.  The pedicle was then rotated into position and sutured.  Once the tube was sutured into place, adequate blood supply was confirmed with blanching and refill.  The pedicle was then wrapped with xeroform gauze and dressed appropriately with a telfa and gauze bandage to ensure continued blood supply and protect the attached pedicle.
Cheek-To-Nose Interpolation Flap Text: A decision was made to reconstruct the defect utilizing an interpolation axial flap and a staged reconstruction.  A telfa template was made of the defect.  This telfa template was then used to outline the Cheek-To-Nose Interpolation flap.  The donor area for the pedicle flap was then injected with anesthesia.  The flap was excised through the skin and subcutaneous tissue down to the layer of the underlying musculature.  The interpolation flap was carefully excised within this deep plane to maintain its blood supply.  The edges of the donor site were undermined.   The donor site was closed in a primary fashion.  The pedicle was then rotated into position and sutured.  Once the tube was sutured into place, adequate blood supply was confirmed with blanching and refill.  The pedicle was then wrapped with xeroform gauze and dressed appropriately with a telfa and gauze bandage to ensure continued blood supply and protect the attached pedicle.
Estlander Flap (Lower To Upper Lip) Text: The defect of the lower lip was assessed and measured.  Given the location and size of the defect, an Estlander flap was deemed most appropriate. Using a sterile surgical marker, an appropriate Estlander flap was measured and drawn on the upper lip. Local anesthesia was then infiltrated. A scalpel was then used to incise the lateral aspect of the flap, through skin, muscle and mucosa, leaving the flap pedicled medially.  The flap was then rotated and positioned to fill the lower lip defect.  The flap was then sutured into place with a three layer technique, closing the orbicularis oris muscle layer with subcutaneous buried sutures, followed by a mucosal layer and an epidermal layer.
Interpolation Flap Text: A decision was made to reconstruct the defect utilizing an interpolation axial flap and a staged reconstruction.  A telfa template was made of the defect.  This telfa template was then used to outline the interpolation flap.  The donor area for the pedicle flap was then injected with anesthesia.  The flap was excised through the skin and subcutaneous tissue down to the layer of the underlying musculature.  The interpolation flap was carefully excised within this deep plane to maintain its blood supply.  The edges of the donor site were undermined.   The donor site was closed in a primary fashion.  The pedicle was then rotated into position and sutured.  Once the tube was sutured into place, adequate blood supply was confirmed with blanching and refill.  The pedicle was then wrapped with xeroform gauze and dressed appropriately with a telfa and gauze bandage to ensure continued blood supply and protect the attached pedicle.
Melolabial Interpolation Flap Text: A decision was made to reconstruct the defect utilizing an interpolation axial flap and a staged reconstruction.  A telfa template was made of the defect.  This telfa template was then used to outline the melolabial interpolation flap.  The donor area for the pedicle flap was then injected with anesthesia.  The flap was excised through the skin and subcutaneous tissue down to the layer of the underlying musculature.  The pedicle flap was carefully excised within this deep plane to maintain its blood supply.  The edges of the donor site were undermined.   The donor site was closed in a primary fashion.  The pedicle was then rotated into position and sutured.  Once the tube was sutured into place, adequate blood supply was confirmed with blanching and refill.  The pedicle was then wrapped with xeroform gauze and dressed appropriately with a telfa and gauze bandage to ensure continued blood supply and protect the attached pedicle.
Mastoid Interpolation Flap Text: A decision was made to reconstruct the defect utilizing an interpolation axial flap and a staged reconstruction.  A telfa template was made of the defect.  This telfa template was then used to outline the mastoid interpolation flap.  The donor area for the pedicle flap was then injected with anesthesia.  The flap was excised through the skin and subcutaneous tissue down to the layer of the underlying musculature.  The pedicle flap was carefully excised within this deep plane to maintain its blood supply.  The edges of the donor site were undermined.   The donor site was closed in a primary fashion.  The pedicle was then rotated into position and sutured.  Once the tube was sutured into place, adequate blood supply was confirmed with blanching and refill.  The pedicle was then wrapped with xeroform gauze and dressed appropriately with a telfa and gauze bandage to ensure continued blood supply and protect the attached pedicle.
Paramedian Forehead Flap Text: A decision was made to reconstruct the defect utilizing an interpolation axial flap and a staged reconstruction.  A telfa template was made of the defect.  This telfa template was then used to outline the paramedian forehead pedicle flap.  The donor area for the pedicle flap was then injected with anesthesia.  The flap was excised through the skin and subcutaneous tissue down to the layer of the underlying musculature.  The pedicle flap was carefully excised within this deep plane to maintain its blood supply.  The edges of the donor site were undermined.   The donor site was closed in a primary fashion.  The pedicle was then rotated into position and sutured.  Once the tube was sutured into place, adequate blood supply was confirmed with blanching and refill.  The pedicle was then wrapped with xeroform gauze and dressed appropriately with a telfa and gauze bandage to ensure continued blood supply and protect the attached pedicle.
Posterior Auricular Interpolation Flap Text: A decision was made to reconstruct the defect utilizing an interpolation axial flap and a staged reconstruction.  A telfa template was made of the defect.  This telfa template was then used to outline the posterior auricular interpolation flap.  The donor area for the pedicle flap was then injected with anesthesia.  The flap was excised through the skin and subcutaneous tissue down to the layer of the underlying musculature.  The pedicle flap was carefully excised within this deep plane to maintain its blood supply.  The edges of the donor site were undermined.   The donor site was closed in a primary fashion.  The pedicle was then rotated into position and sutured.  Once the tube was sutured into place, adequate blood supply was confirmed with blanching and refill.  The pedicle was then wrapped with xeroform gauze and dressed appropriately with a telfa and gauze bandage to ensure continued blood supply and protect the attached pedicle.
Cheiloplasty (Complex) Text: A decision was made to reconstruct the defect with a  cheiloplasty.  The defect was undermined extensively.  Additional obicularis oris muscle was excised with a 15 blade scalpel.  The defect was converted into a full thickness wedge to facilite a better cosmetic result.  Small vessels were then tied off with 5-0 monocyrl. The obicularis oris, superficial fascia, adipose and dermis were then reapproximated.  After the deeper layers were approximated the epidermis was reapproximated with particular care given to realign the vermilion border.
Cheiloplasty (Less Than 50%) Text: A decision was made to reconstruct the defect with a  cheiloplasty.  The defect was undermined extensively.  Additional obicularis oris muscle was excised with a 15 blade scalpel.  The defect was converted into a full thickness wedge, of less than 50% of the vertical height of the lip, to facilite a better cosmetic result.  Small vessels were then tied off with 5-0 monocyrl. The obicularis oris, superficial fascia, adipose and dermis were then reapproximated.  After the deeper layers were approximated the epidermis was reapproximated with particular care given to realign the vermilion border.
Ear Wedge Repair Text: A wedge excision was completed by carrying down an excision through the full thickness of the ear and cartilage with an inward facing Burow's triangle. The wound was then closed in a layered fashion.
Full Thickness Lip Wedge Repair (Flap) Text: Given the location of the defect and the proximity to free margins a full thickness wedge repair was deemed most appropriate.  Using a sterile surgical marker, the appropriate repair was drawn incorporating the defect and placing the expected incisions perpendicular to the vermilion border.  The vermilion border was also meticulously outlined to ensure appropriate reapproximation during the repair.  The area thus outlined was incised through and through with a #15 scalpel blade.  The muscularis and dermis were reaproximated with deep sutures following hemostasis. Care was taken to realign the vermilion border before proceeding with the superficial closure.  Once the vermilion was realigned the superfical and mucosal closure was finished.
Burow's Graft Text: The defect edges were debeveled with a #15 scalpel blade. Given the location of the defect, shape of the defect, the proximity to free margins and the presence of a standing cone deformity a Burow's skin graft was deemed most appropriate. The standing cone was removed and this tissue was then trimmed to the shape of the primary defect. The adipose tissue was also removed until only dermis and epidermis were left.  The skin graft was then placed in the primary defect and oriented appropriately.
Cartilage Graft Text: The defect edges were debeveled with a #15 scalpel blade.  Given the location of the defect, shape of the defect, the fact the defect involved a full thickness cartilage defect a cartilage graft was deemed most appropriate.  An appropriate donor site was identified, cleansed, and anesthetized. The cartilage graft was then harvested and transferred to the recipient site, oriented appropriately and then sutured into place.  The secondary defect was then repaired using a primary closure.
Composite Graft Text: The defect edges were debeveled with a #15 scalpel blade.  Given the location of the defect, shape of the defect, the proximity to free margins and the fact the defect was full thickness a composite graft was deemed most appropriate.  The defect was outline and then transferred to the donor site.  A full thickness graft was then excised from the donor site. The graft was then placed in the primary defect, oriented appropriately and then sutured into place.  The secondary defect was then repaired using a primary closure.
Epidermal Autograft Text: The defect edges were debeveled with a #15 scalpel blade.  Given the location of the defect, shape of the defect and the proximity to free margins an epidermal autograft was deemed most appropriate.  Using a sterile surgical marker, the primary defect shape was transferred to the donor site. The epidermal graft was then harvested.  The skin graft was then placed in the primary defect and oriented appropriately.
Dermal Autograft Text: The defect edges were debeveled with a #15 scalpel blade.  Given the location of the defect, shape of the defect and the proximity to free margins a dermal autograft was deemed most appropriate.  Using a sterile surgical marker, the primary defect shape was transferred to the donor site. The area thus outlined was incised deep to adipose tissue with a #15 scalpel blade.  The harvested graft was then trimmed of adipose and epidermal tissue until only dermis was left.  The skin graft was then placed in the primary defect and oriented appropriately.
Ftsg Text: The defect edges were debeveled with a #15 scalpel blade.  Given the location of the defect, shape of the defect and the proximity to free margins a full thickness skin graft was deemed most appropriate.  Using a sterile surgical marker, the primary defect shape was transferred to the donor site. The area thus outlined was incised deep to adipose tissue with a #15 scalpel blade.  The harvested graft was then trimmed of adipose tissue until only dermis and epidermis was left.  The skin margins of the secondary defect were undermined to an appropriate distance in all directions utilizing iris scissors.  The secondary defect was closed with interrupted buried subcutaneous sutures.  The skin edges were then re-apposed with running  sutures.  The skin graft was then placed in the primary defect and oriented appropriately.
Pinch Graft Text: The defect edges were debeveled with a #15 scalpel blade. Given the location of the defect, shape of the defect and the proximity to free margins a pinch graft was deemed most appropriate. Using a sterile surgical marker, the primary defect shape was transferred to the donor site. The area thus outlined was incised deep to adipose tissue with a #15 scalpel blade.  The harvested graft was then trimmed of adipose tissue until only dermis and epidermis was left. The skin graft was then placed in the primary defect and oriented appropriately.
Skin Substitute Text: The defect edges were debeveled with a #15 scalpel blade.  Given the location of the defect, shape of the defect and the proximity to free margins a skin substitute graft was deemed most appropriate.  The graft material was trimmed to fit the size of the defect. The graft was then placed in the primary defect and oriented appropriately.
Split-Thickness Skin Graft Text: The defect edges were debeveled with a #15 scalpel blade.  Given the location of the defect, shape of the defect and the proximity to free margins a split thickness skin graft was deemed most appropriate.  Using a sterile surgical marker, the primary defect shape was transferred to the donor site. The split thickness graft was then harvested.  The skin graft was then placed in the primary defect and oriented appropriately.
Tissue Cultured Epidermal Autograft Text: The defect edges were debeveled with a #15 scalpel blade.  Given the location of the defect, shape of the defect and the proximity to free margins a tissue cultured epidermal autograft was deemed most appropriate.  The graft was then trimmed to fit the size of the defect.  The graft was then placed in the primary defect and oriented appropriately.
Xenograft Text: The defect edges were debeveled with a #15 scalpel blade.  Given the location of the defect, shape of the defect and the proximity to free margins a xenograft was deemed most appropriate.  The graft was then trimmed to fit the size of the defect.  The graft was then placed in the primary defect and oriented appropriately.
Complex Repair And Flap Additional Text (Will Appearing After The Standard Complex Repair Text): The complex repair was not sufficient to completely close the primary defect. The remaining additional defect was repaired with the flap mentioned below.
Complex Repair And Graft Additional Text (Will Appearing After The Standard Complex Repair Text): The complex repair was not sufficient to completely close the primary defect. The remaining additional defect was repaired with the graft mentioned below.
Eyelid Full Thickness Repair - 19925: The eyelid defect was full thickness which required a wedge repair of the eyelid. Special care was taken to ensure that the eyelid margin was realligned when placing sutures.
Eyelid Partial Thickness Repair - 51785: The eyelid defect was partial thickness which required a wedge repair of the eyelid. Special care was taken to ensure that the eyelid margin was realligned when placing sutures.
Intermediate Repair And Flap Additional Text (Will Appearing After The Standard Complex Repair Text): The intermediate repair was not sufficient to completely close the primary defect. The remaining additional defect was repaired with the flap mentioned below.
Intermediate Repair And Graft Additional Text (Will Appearing After The Standard Complex Repair Text): The intermediate repair was not sufficient to completely close the primary defect. The remaining additional defect was repaired with the graft mentioned below.
Localized Dermabrasion With 15 Blade Text: The patient was draped in routine manner.  Localized dermabrasion using a 15 blade was performed in routine manner to papillary dermis. This spot dermabrasion is being performed to complete skin cancer reconstruction. It also will eliminate the other sun damaged precancerous cells that are known to be part of the regional effect of a lifetime's worth of sun exposure. This localized dermabrasion is therapeutic and should not be considered cosmetic in any regard.
Localized Dermabrasion With Sand Papertext: The patient was draped in routine manner.  Localized dermabrasion using sterile sand paper was performed in routine manner to papillary dermis. This spot dermabrasion is being performed to complete skin cancer reconstruction. It also will eliminate the other sun damaged precancerous cells that are known to be part of the regional effect of a lifetime's worth of sun exposure. This localized dermabrasion is therapeutic and should not be considered cosmetic in any regard.
Localized Dermabrasion With Wire Brush Text: The patient was draped in routine manner.  Localized dermabrasion using 3 x 17 mm wire brush was performed in routine manner to papillary dermis. This spot dermabrasion is being performed to complete skin cancer reconstruction. It also will eliminate the other sun damaged precancerous cells that are known to be part of the regional effect of a lifetime's worth of sun exposure. This localized dermabrasion is therapeutic and should not be considered cosmetic in any regard.
Purse String (Simple) Text: Given the location of the defect and the characteristics of the surrounding skin a purse string closure was deemed most appropriate.  Undermining was performed circumfirentially around the surgical defect.  A purse string suture was then placed and tightened.
Purse String (Intermediate) Text: Given the location of the defect and the characteristics of the surrounding skin a purse string intermediate closure was deemed most appropriate.  Undermining was performed circumfirentially around the surgical defect.  A purse string suture was then placed and tightened.
Partial Purse String (Simple) Text: Given the location of the defect and the characteristics of the surrounding skin a simple purse string closure was deemed most appropriate.  Undermining was performed circumfirentially around the surgical defect.  A purse string suture was then placed and tightened. Wound tension only allowed a partial closure of the circular defect.
Partial Purse String (Intermediate) Text: Given the location of the defect and the characteristics of the surrounding skin an intermediate purse string closure was deemed most appropriate.  Undermining was performed circumfirentially around the surgical defect.  A purse string suture was then placed and tightened. Wound tension only allowed a partial closure of the circular defect.
Tarsorrhaphy Text: A tarsorrhaphy was performed using Frost sutures.
Manual Repair Warning Statement: We plan on removing the manually selected variable below in favor of our much easier automatic structured text blocks found in the previous tab. We decided to do this to help make the flow better and give you the full power of structured data. Manual selection is never going to be ideal in our platform and I would encourage you to avoid using manual selection from this point on, especially since I will be sunsetting this feature. It is important that you do one of two things with the customized text below. First, you can save all of the text in a word file so you can have it for future reference. Second, transfer the text to the appropriate area in the Library tab. Lastly, if there is a flap or graft type which we do not have you need to let us know right away so I can add it in before the variable is hidden. No need to panic, we plan to give you roughly 6 months to make the change.
Same Histology In Subsequent Stages Text: The pattern and morphology of the tumor is as described in the first stage.
No Residual Tumor Seen Histology Text: There were no malignant cells seen in the sections examined.
Inflammation Suggestive Of Cancer Camouflage Histology Text: There was a dense lymphocytic infiltrate which prevented adequate histologic evaluation of adjacent structures.
Bcc Histology Text: There were numerous aggregates of basaloid cells.
Bcc Infiltrative Histology Text: There were numerous aggregates of basaloid cells demonstrating an infiltrative pattern.
Mart-1 - Positive Histology Text: MART-1 staining demonstrates areas of higher density and clustering of melanocytes with Pagetoid spread upwards within the epidermis. The surgical margins are positive for tumor cells.
Mart-1 - Negative Histology Text: MART-1 staining demonstrates a normal density and pattern of melanocytes along the dermal-epidermal junction. The surgical margins are negative for tumor cells.
Information: Selecting Yes will display possible errors in your note based on the variables you have selected. This validation is only offered as a suggestion for you. PLEASE NOTE THAT THE VALIDATION TEXT WILL BE REMOVED WHEN YOU FINALIZE YOUR NOTE. IF YOU WANT TO FAX A PRELIMINARY NOTE YOU WILL NEED TO TOGGLE THIS TO 'NO' IF YOU DO NOT WANT IT IN YOUR FAXED NOTE.
Bill 59 Modifier?: No - Continue to Bill 79 Modifier

## 2025-02-05 NOTE — PROCEDURE: REPAIR NOTE
Bilobed Transposition Flap Text: The defect edges were debeveled with a #15 scalpel blade. Given the location of the defect and the proximity to free margins a bilobed transposition flap was deemed most appropriate. Using a sterile surgical marker, an appropriate bilobe flap drawn around the defect. The area thus outlined was incised deep to adipose tissue with a #15 scalpel blade. The skin margins were undermined to an appropriate distance in all directions utilizing iris scissors. Following this, the designed flap was carried over into the primary defect and sutured into place.
No Repair - Repaired With Adjacent Surgical Defect Text (Leave Blank If You Do Not Want): After obtaining clear surgical margins the defect was repaired concurrently with another surgical defect which was in close approximation.
Complex Repair And Graft Additional Text (Will Appearing After The Standard Complex Repair Text): The complex repair was not sufficient to completely close the primary defect. The remaining additional defect was repaired with the graft mentioned below.
Anesthesia Volume In Cc: 0
Tissue Cultured Epidermal Autograft Text: The defect edges were debeveled with a #15 scalpel blade. Given the location of the defect, shape of the defect and the proximity to free margins a tissue cultured epidermal autograft was deemed most appropriate.  The graft was then trimmed to fit the size of the defect.  The graft was then placed in the primary defect and oriented appropriately.
Length To Time In Minutes Device Was In Place: 10
Orbicularis Oris Muscle Flap Text: The defect edges were debeveled with a #15 scalpel blade.  Given that the defect affected the competency of the oral sphincter an orbicularis oris muscle flap was deemed most appropriate to restore this competency and normal muscle function.  Using a sterile surgical marker, an appropriate flap was drawn incorporating the defect. The area thus outlined was incised with a #15 scalpel blade. Following this, the designed flap was carried over into the primary defect and sutured into place.
Interpolation Flap Text: A decision was made to reconstruct the defect utilizing an interpolation axial flap and a staged reconstruction.  A telfa template was made of the defect.  This telfa template was then used to outline the interpolation flap.  The donor area for the pedicle flap was then injected with anesthesia.  The flap was excised through the skin and subcutaneous tissue down to the layer of the underlying musculature.  The interpolation flap was carefully excised within this deep plane to maintain its blood supply.  The edges of the donor site were undermined.   The donor site was closed in a primary fashion.  The pedicle was then rotated into position and sutured.  Once the tube was sutured into place, adequate blood supply was confirmed with blanching and refill.  The pedicle was then wrapped with xeroform gauze and dressed appropriately with a telfa and gauze bandage to ensure continued blood supply and protect the attached pedicle.
Information: Selecting Yes will display possible errors in your note based on the variables you have selected. This validation is only offered as a suggestion for you. PLEASE NOTE THAT THE VALIDATION TEXT WILL BE REMOVED WHEN YOU FINALIZE YOUR NOTE. IF YOU WANT TO FAX A PRELIMINARY NOTE YOU WILL NEED TO TOGGLE THIS TO 'NO' IF YOU DO NOT WANT IT IN YOUR FAXED NOTE.
Rotation Flap Text: The defect edges were debeveled with a #15 scalpel blade. Given the location of the defect, shape of the defect and the proximity to free margins a rotation flap was deemed most appropriate. Using a sterile surgical marker, an appropriate rotation flap was drawn incorporating the defect and placing the expected incisions within the relaxed skin tension lines where possible. The area thus outlined was incised deep to adipose tissue with a #15 scalpel blade. The skin margins were undermined to an appropriate distance in all directions utilizing iris scissors. Following this, the designed flap was carried over into the primary defect and sutured into place.
Incorporate Suturegard In Repair Note?: No
Ftsg Text: The defect edges were debeveled with a #15 scalpel blade. Given the location of the defect, shape of the defect and the proximity to free margins a full thickness skin graft was deemed most appropriate. Using a sterile surgical marker, the primary defect shape was transferred to the donor site. The area thus outlined was incised deep to adipose tissue with a #15 scalpel blade.  The harvested graft was then trimmed of adipose tissue until only dermis and epidermis was left.  The skin margins of the secondary defect were undermined to an appropriate distance in all directions utilizing iris scissors.  The secondary defect was closed with interrupted buried subcutaneous sutures.  The skin edges were then re-apposed with running  sutures.  The skin graft was then placed in the primary defect and oriented appropriately.
Lazy S Intermediate Repair Preamble Text (Leave Blank If You Do Not Want): Undermining was performed with blunt dissection.
Include The Following Details In The Note (If No Details Will Only Be Reflected In The Surgical Fax): Yes
O-T Plasty Text: The defect edges were debeveled with a #15 scalpel blade. Given the location of the defect, shape of the defect and the proximity to free margins an O-T plasty was deemed most appropriate. Using a sterile surgical marker, an appropriate O-T plasty was drawn incorporating the defect and placing the expected incisions within the relaxed skin tension lines where possible. The area thus outlined was incised deep to adipose tissue with a #15 scalpel blade. The skin margins were undermined to an appropriate distance in all directions utilizing iris scissors. Following this, the designed flap was carried over into the primary defect and sutured into place.
O-T Advancement Flap Text: The defect edges were debeveled with a #15 scalpel blade. Given the location of the defect, shape of the defect and the proximity to free margins an O-T advancement flap was deemed most appropriate. Using a sterile surgical marker, an appropriate advancement flap was drawn incorporating the defect and placing the expected incisions within the relaxed skin tension lines where possible. The area thus outlined was incised deep to adipose tissue with a #15 scalpel blade. The skin margins were undermined to an appropriate distance in all directions utilizing iris scissors. Following this, the designed flap was advanced and carried over into the primary defect and sutured into place.
Localized Dermabrasion With Sand Papertext: The patient was draped in routine manner.  Localized dermabrasion using sterile sand paper was performed in routine manner to papillary dermis. This spot dermabrasion is being performed to complete skin cancer reconstruction. It also will eliminate the other sun damaged precancerous cells that are known to be part of the regional effect of a lifetime's worth of sun exposure. This localized dermabrasion is therapeutic and should not be considered cosmetic in any regard.
Melolabial Interpolation Flap Division And Inset Text: Division and inset of the melolabial interpolation flap was performed to achieve optimal aesthetic result, restore normal anatomic appearance and avoid distortion of normal anatomy, expedite and facilitate wound healing, achieve optimal functional result and because linear closure either not possible or would produce suboptimal result. The patient was prepped and draped in the usual manner. The pedicle was infiltrated with local anesthesia. The pedicle was sectioned with a #15 blade. The pedicle was de-bulked and trimmed to match the shape of the defect. Hemostasis was achieved. The flap donor site and free margin of the flap were secured with deep buried sutures and the wound edges were re-approximated.
Wound Care: Bacitracin
Manual Repair Warning Statement: We plan on removing the manually selected variable below in favor of our much easier automatic structured text blocks found in the previous tab. We decided to do this to help make the flow better and give you the full power of structured data. Manual selection is never going to be ideal in our platform and I would encourage you to avoid using manual selection from this point on, especially since I will be sunsetting this feature. It is important that you do one of two things with the customized text below. First, you can save all of the text in a word file so you can have it for future reference. Second, transfer the text to the appropriate area in the Library tab. Lastly, if there is a flap or graft type which we do not have you need to let us know right away so I can add it in before the variable is hidden. No need to panic, we plan to give you roughly 6 months to make the change.
Banner Transposition Flap Text: The defect edges were debeveled with a #15 scalpel blade. Given the location of the defect and the proximity to free margins a Banner transposition flap was deemed most appropriate. Using a sterile surgical marker, an appropriate flap was drawn around the defect. The area thus outlined was incised deep to adipose tissue with a #15 scalpel blade. The skin margins were undermined to an appropriate distance in all directions utilizing iris scissors. Following this, the designed flap was carried into the primary defect and sutured into place.
Intermediate Repair And Graft Additional Text (Will Appearing After The Standard Complex Repair Text): The intermediate repair was not sufficient to completely close the primary defect. The remaining additional defect was repaired with the graft mentioned below.
Referred To Otolaryngology For Closure Text (Leave Blank If You Do Not Want): After obtaining clear surgical margins the patient was sent to otolaryngology for surgical repair.  The patient understands they will receive post-surgical care and follow-up from the referring physician's office.
Nasalis-Muscle-Based Myocutaneous Island Pedicle Flap Text: Using a #15 blade, an incision was made around the donor flap to the level of the nasalis muscle. Wide lateral undermining was then performed in both the subcutaneous plane above the nasalis muscle, and in a submuscular plane just above periosteum. This allowed the formation of a free nasalis muscle axial pedicle (based on the angular artery) which was still attached to the actual cutaneous flap, increasing its mobility and vascular viability. Hemostasis was obtained with pinpoint electrocoagulation. The flap was mobilized into position and the pivotal anchor points positioned and stabilized with buried interrupted sutures. Subcutaneous and dermal tissues were closed in a multilayered fashion with sutures. Tissue redundancies were excised, and the epidermal edges were apposed without significant tension and sutured with sutures.
Epidermal Sutures: 4-0 Prolene
Skin Substitute Paste Text: The defect edges were debeveled with a #15 scalpel blade. Given the location of the defect, shape of the defect and the proximity to free margins a skin substitute micronized graft was deemed most appropriate.  The entire vial contents were admixed with 0.5ccs of sterile saline, formed into a paste and then evenly spread over the entire wound bed.
Cheek Interpolation Flap Text: A decision was made to reconstruct the defect utilizing an interpolation axial flap and a staged reconstruction.  A telfa template was made of the defect.  This telfa template was then used to outline the Cheek Interpolation flap.  The donor area for the pedicle flap was then injected with anesthesia.  The flap was excised through the skin and subcutaneous tissue down to the layer of the underlying musculature.  The interpolation flap was carefully excised within this deep plane to maintain its blood supply.  The edges of the donor site were undermined.   The donor site was closed in a primary fashion.  The pedicle was then rotated into position and sutured.  Once the tube was sutured into place, adequate blood supply was confirmed with blanching and refill.  The pedicle was then wrapped with xeroform gauze and dressed appropriately with a telfa and gauze bandage to ensure continued blood supply and protect the attached pedicle.
Ear Wedge Repair Text: A wedge excision was completed by carrying down an excision through the full thickness of the ear and cartilage with an inward facing Burow's triangle. The wound was then closed in a layered fashion.
Peng Advancement Flap Text: The defect edges were debeveled with a #15 scalpel blade. Given the location of the defect, shape of the defect and the proximity to free margins a Peng advancement flap was deemed most appropriate. Using a sterile surgical marker, an appropriate advancement flap was drawn incorporating the defect and placing the expected incisions within the relaxed skin tension lines where possible. The area thus outlined was incised deep to adipose tissue with a #15 scalpel blade. The skin margins were undermined to an appropriate distance in all directions utilizing iris scissors. Following this, the designed flap was advanced and carried over into the primary defect and sutured into place.
Debridement Text: The wound edges were debrided prior to proceeding with the closure to facilitate wound healing.
Dressing: dry sterile dressing
Island Pedicle Flap-Requiring Vessel Identification Text: The defect edges were debeveled with a #15 scalpel blade. Given the location of the defect, shape of the defect and the proximity to free margins an island pedicle advancement flap was deemed most appropriate. Using a sterile surgical marker, an appropriate advancement flap was drawn, based on the axial vessel mentioned above, incorporating the defect, outlining the appropriate donor tissue and placing the expected incisions within the relaxed skin tension lines where possible. The area thus outlined was incised deep to adipose tissue with a #15 scalpel blade. The skin margins were undermined to an appropriate distance in all directions around the primary defect and laterally outward around the island pedicle utilizing iris scissors.  There was minimal undermining beneath the pedicle flap. Following this, the designed flap was carried over into the primary defect and sutured into place.
Type Of Previous Surgery (Optional- Ie Mohs Surgery): Mohs
Cheek Interpolation Flap Division And Inset Text: Division and inset of the cheek interpolation flap was performed to achieve optimal aesthetic result, restore normal anatomic appearance and avoid distortion of normal anatomy, expedite and facilitate wound healing, achieve optimal functional result and because linear closure either not possible or would produce suboptimal result. The patient was prepped and draped in the usual manner. The pedicle was infiltrated with local anesthesia. The pedicle was sectioned with a #15 blade. The pedicle was de-bulked and trimmed to match the shape of the defect. Hemostasis was achieved. The flap donor site and free margin of the flap were secured with deep buried sutures and the wound edges were re-approximated.
Bilateral Helical Rim Advancement Flap Text: The defect edges were debeveled with a #15 blade scalpel.  Given the location of the defect and the proximity to free margins (helical rim) a bilateral helical rim advancement flap was deemed most appropriate. Using a sterile surgical marker, the appropriate advancement flaps were drawn incorporating the defect and placing the expected incisions between the helical rim and antihelix where possible.  The area thus outlined was incised through and through with a #15 scalpel blade.  With a skin hook and iris scissors, the flaps were gently and sharply undermined and freed up. Following this, the designed flaps were placed into the primary defect and sutured into place.
Tarsorrhaphy Text: A tarsorrhaphy was performed using Frost sutures.
Referred To Asc For Closure Text (Leave Blank If You Do Not Want): After obtaining clear surgical margins the patient was sent to an ASC for surgical repair.  The patient understands they will receive post-surgical care and follow-up from the ASC physician.
Dermal Autograft Text: The defect edges were debeveled with a #15 scalpel blade. Given the location of the defect, shape of the defect and the proximity to free margins a dermal autograft was deemed most appropriate. Using a sterile surgical marker, the primary defect shape was transferred to the donor site. The area thus outlined was incised deep to adipose tissue with a #15 scalpel blade.  The harvested graft was then trimmed of adipose and epidermal tissue until only dermis was left.  The skin graft was then placed in the primary defect and oriented appropriately.
Mustarde Flap Text: The defect edges were debeveled with a #15 scalpel blade.  Given the size, depth and location of the defect and the proximity to free margins a Mustarde flap was deemed most appropriate. Using a sterile surgical marker, an appropriate flap was drawn incorporating the defect. The area thus outlined was incised with a #15 scalpel blade. The skin margins were undermined to an appropriate distance in all directions utilizing iris scissors. Following this, the designed flap was carried into the primary defect and sutured into place.
Advancement Flap (Single) Text: The defect edges were debeveled with a #15 scalpel blade. Given the location of the defect and the proximity to free margins a single advancement flap was deemed most appropriate. Using a sterile surgical marker, an appropriate advancement flap was drawn incorporating the defect and placing the expected incisions within the relaxed skin tension lines where possible. The area thus outlined was incised deep to adipose tissue with a #15 scalpel blade. The skin margins were undermined to an appropriate distance in all directions utilizing iris scissors. Following this, the designed flap was advanced and carried over into the primary defect and sutured into place.
Double Z Plasty Text: The lesion was extirpated to the level of the fat with a #15 scalpel blade. Given the location of the defect, shape of the defect and the proximity to free margins a double Z-plasty was deemed most appropriate for repair. Using a sterile surgical marker, the appropriate transposition arms of the double Z-plasty were drawn incorporating the defect and placing the expected incisions within the relaxed skin tension lines where possible. The area thus outlined was incised deep to adipose tissue with a #15 scalpel blade. The skin margins were undermined to an appropriate distance in all directions utilizing iris scissors. The opposing transposition arms were then transposed and carried over into place in opposite direction and anchored with interrupted buried subcutaneous sutures.
Modified Advancement Flap Text: The defect edges were debeveled with a #15 scalpel blade. Given the location of the defect, shape of the defect and the proximity to free margins a modified advancement flap was deemed most appropriate. Using a sterile surgical marker, an appropriate advancement flap was drawn incorporating the defect and placing the expected incisions within the relaxed skin tension lines where possible. The area thus outlined was incised deep to adipose tissue with a #15 scalpel blade. The skin margins were undermined to an appropriate distance in all directions utilizing iris scissors. Following this, the designed flap was advanced and carried over into the primary defect and sutured into place.
Localized Dermabrasion With 15 Blade Text: The patient was draped in routine manner.  Localized dermabrasion using a 15 blade was performed in routine manner to papillary dermis. This spot dermabrasion is being performed to complete skin cancer reconstruction. It also will eliminate the other sun damaged precancerous cells that are known to be part of the regional effect of a lifetime's worth of sun exposure. This localized dermabrasion is therapeutic and should not be considered cosmetic in any regard.
Helical Rim Advancement Flap Text: The defect edges were debeveled with a #15 blade scalpel.  Given the location of the defect and the proximity to free margins (helical rim) a double helical rim advancement flap was deemed most appropriate. Using a sterile surgical marker, the appropriate advancement flaps were drawn incorporating the defect and placing the expected incisions between the helical rim and antihelix where possible.  The area thus outlined was incised through and through with a #15 scalpel blade.  With a skin hook and iris scissors, the flaps were gently and sharply undermined and freed up. Folllowing this, the designed flaps were carried over into the primary defect and sutured into place.
Abbe Flap (Lower To Upper Lip) Text: The defect of the upper lip was assessed and measured.  Given the location and size of the defect, an Abbe flap was deemed most appropriate. Using a sterile surgical marker, an appropriate Abbe flap was measured and drawn on the lower lip. Local anesthesia was then infiltrated. A scalpel was then used to incise the upper lip through and through the skin, vermilion, muscle and mucosa, leaving the flap pedicled on the opposite side.  The flap was then rotated and transferred to the lower lip defect.  The flap was then sutured into place with a three layer technique, closing the orbicularis oris muscle layer with subcutaneous buried sutures, followed by a mucosal layer and an epidermal layer.
Referred To Mid-Level For Closure Text (Leave Blank If You Do Not Want): After obtaining clear surgical margins the patient was sent to a mid-level provider for surgical repair.  The patient understands they will receive post-surgical care and follow-up from the mid-level provider.
Epidermal Autograft Text: The defect edges were debeveled with a #15 scalpel blade. Given the location of the defect, shape of the defect and the proximity to free margins an epidermal autograft was deemed most appropriate. Using a sterile surgical marker, the primary defect shape was transferred to the donor site. The epidermal graft was then harvested.  The skin graft was then placed in the primary defect and oriented appropriately.
Muscle Hinge Flap Text: The defect edges were debeveled with a #15 scalpel blade.  Given the size, depth and location of the defect and the proximity to free margins a muscle hinge flap was deemed most appropriate. Using a sterile surgical marker, an appropriate hinge flap was drawn incorporating the defect. The area thus outlined was incised with a #15 scalpel blade. The skin margins were undermined to an appropriate distance in all directions utilizing iris scissors. Following this, the designed flap was carried into the primary defect and sutured into place.
Advancement Flap (Double) Text: The defect edges were debeveled with a #15 scalpel blade. Given the location of the defect and the proximity to free margins a double advancement flap was deemed most appropriate. Using a sterile surgical marker, the appropriate advancement flaps were drawn incorporating the defect and placing the expected incisions within the relaxed skin tension lines where possible. The area thus outlined was incised deep to adipose tissue with a #15 scalpel blade. The skin margins were undermined to an appropriate distance in all directions utilizing iris scissors. Following this, the designed flaps were advanced and carried over into the primary defect and sutured into place.
Crescentic Complex Repair Preamble Text (Leave Blank If You Do Not Want): Extensive wide undermining was performed.
Estlander Flap (Lower To Upper Lip) Text: The defect of the lower lip was assessed and measured.  Given the location and size of the defect, an Estlander flap was deemed most appropriate. Using a sterile surgical marker, an appropriate Estlander flap was measured and drawn on the upper lip. Local anesthesia was then infiltrated. A scalpel was then used to incise the lateral aspect of the flap, through skin, muscle and mucosa, leaving the flap pedicled medially.  The flap was then rotated and positioned to fill the lower lip defect.  The flap was then sutured into place with a three layer technique, closing the orbicularis oris muscle layer with subcutaneous buried sutures, followed by a mucosal layer and an epidermal layer.
Z Plasty Text: The lesion was extirpated to the level of the fat with a #15 scalpel blade. Given the location of the defect, shape of the defect and the proximity to free margins a Z-plasty was deemed most appropriate for repair. Using a sterile surgical marker, the appropriate transposition arms of the Z-plasty were drawn incorporating the defect and placing the expected incisions within the relaxed skin tension lines where possible. The area thus outlined was incised deep to adipose tissue with a #15 scalpel blade. The skin margins were undermined to an appropriate distance in all directions utilizing iris scissors. The opposing transposition arms were then transposed and carried over into place in opposite direction and anchored with interrupted buried subcutaneous sutures.
Mercedes Flap Text: The defect edges were debeveled with a #15 scalpel blade. Given the location of the defect, shape of the defect and the proximity to free margins a Mercedes flap was deemed most appropriate. Using a sterile surgical marker, an appropriate advancement flap was drawn incorporating the defect and placing the expected incisions within the relaxed skin tension lines where possible. The area thus outlined was incised deep to adipose tissue with a #15 scalpel blade. The skin margins were undermined to an appropriate distance in all directions utilizing iris scissors. Following this, the designed flap was advanced and carried over into the primary defect and sutured into place.
Preparation Of Recipient Site - Flap: The eschar was removed surgically with sharp dissection to facilitate appropriate wound healing of the following adjacent tissue rearrangement.
Island Pedicle Flap With Canthal Suspension Text: The defect edges were debeveled with a #15 scalpel blade. Given the location of the defect, shape of the defect and the proximity to free margins an island pedicle advancement flap was deemed most appropriate. Using a sterile surgical marker, an appropriate advancement flap was drawn incorporating the defect, outlining the appropriate donor tissue and placing the expected incisions within the relaxed skin tension lines where possible. The area thus outlined was incised deep to adipose tissue with a #15 scalpel blade. The skin margins were undermined to an appropriate distance in all directions around the primary defect and laterally outward around the island pedicle utilizing iris scissors.  There was minimal undermining beneath the pedicle flap. A suspension suture was placed in the canthal tendon to prevent tension and prevent ectropion. Following this, the designed flap was placed into the primary defect and sutured into place.
Retention Suture Text: Retention sutures were placed to support the closure and prevent dehiscence.
Eyelid Full Thickness Repair - 98473: The eyelid defect was full thickness which required a wedge repair of the eyelid. Special care was taken to ensure that the eyelid margin was realligned when placing sutures.
Rhomboid Transposition Flap Text: The defect edges were debeveled with a #15 scalpel blade. Given the location of the defect and the proximity to free margins a rhomboid transposition flap was deemed most appropriate. Using a sterile surgical marker, an appropriate rhomboid flap was drawn incorporating the defect. The area thus outlined was incised deep to adipose tissue with a #15 scalpel blade. The skin margins were undermined to an appropriate distance in all directions utilizing iris scissors. Following this, the designed flap was carried over into the primary defect and sutured into place.
Suturegard Intro: Intraoperative tissue expansion was performed, utilizing the SUTUREGARD device, in order to reduce wound tension.
Partial Purse String (Simple) Text: Given the location of the defect and the characteristics of the surrounding skin a simple purse string closure was deemed most appropriate.  Undermining was performed circumferentially around the surgical defect.  A purse string suture was then placed and tightened. Wound tension only allowed a partial closure of the circular defect.
Paramedian Forehead Flap Text: A decision was made to reconstruct the defect utilizing an interpolation axial flap and a staged reconstruction.  A telfa template was made of the defect.  This telfa template was then used to outline the paramedian forehead pedicle flap.  The donor area for the pedicle flap was then injected with anesthesia.  The flap was excised through the skin and subcutaneous tissue down to the layer of the underlying musculature.  The pedicle flap was carefully excised within this deep plane to maintain its blood supply.  The edges of the donor site were undermined.   The donor site was closed in a primary fashion.  The pedicle was then rotated into position and sutured.  Once the tube was sutured into place, adequate blood supply was confirmed with blanching and refill.  The pedicle was then wrapped with xeroform gauze and dressed appropriately with a telfa and gauze bandage to ensure continued blood supply and protect the attached pedicle.
Star Wedge Flap Text: The defect edges were debeveled with a #15 scalpel blade. Given the location of the defect, shape of the defect and the proximity to free margins a star wedge flap was deemed most appropriate. Using a sterile surgical marker, an appropriate rotation flap was drawn incorporating the defect and placing the expected incisions within the relaxed skin tension lines where possible. The area thus outlined was incised deep to adipose tissue with a #15 scalpel blade. The skin margins were undermined to an appropriate distance in all directions utilizing iris scissors. Following this, the designed flap was carried over into the primary defect and sutured into place.
Cartilage Graft Text: The defect edges were debeveled with a #15 scalpel blade. Given the location of the defect, shape of the defect, the fact the defect involved a full thickness cartilage defect a cartilage graft was deemed most appropriate.  An appropriate donor site was identified, cleansed, and anesthetized. The cartilage graft was then harvested and transferred to the recipient site, oriented appropriately and then sutured into place.  The secondary defect was then repaired using a primary closure.
Chonodrocutaneous Helical Advancement Flap Text: The defect edges were debeveled with a #15 scalpel blade. Given the location of the defect and the proximity to free margins a chondrocutaneous helical advancement flap was deemed most appropriate. Using a sterile surgical marker, the appropriate advancement flap was drawn incorporating the defect and placing the expected incisions within the relaxed skin tension lines where possible. The area thus outlined was incised deep to adipose tissue with a #15 scalpel blade. The skin margins were undermined to an appropriate distance in all directions utilizing iris scissors. Following this, the designed flap was advanced and carried over into the primary defect and sutured into place.
Pain Refusal Text: I offered to prescribe pain medication but the patient refused to take this medication.
H Plasty Text: Given the location of the defect, shape of the defect and the proximity to free margins a H-plasty was deemed most appropriate for repair. Using a sterile surgical marker, the appropriate advancement arms of the H-plasty were drawn incorporating the defect and placing the expected incisions within the relaxed skin tension lines where possible. The area thus outlined was incised deep to adipose tissue with a #15 scalpel blade. The skin margins were undermined to an appropriate distance in all directions utilizing iris scissors.  The opposing advancement arms were then advanced and carried over into place in opposite direction and anchored with interrupted buried subcutaneous sutures.
V-Y Flap Text: The defect edges were debeveled with a #15 scalpel blade. Given the location of the defect, shape of the defect and the proximity to free margins a V-Y flap was deemed most appropriate. Using a sterile surgical marker, an appropriate advancement flap was drawn incorporating the defect and placing the expected incisions within the relaxed skin tension lines where possible. The area thus outlined was incised deep to adipose tissue with a #15 scalpel blade. The skin margins were undermined to an appropriate distance in all directions utilizing iris scissors. Following this, the designed flap was advanced and carried over into the primary defect and sutured into place.
Suturegard Retention Suture: 2-0 Nylon
Dorsal Nasal Flap Text: The defect edges were debeveled with a #15 scalpel blade. Given the location of the defect and the proximity to free margins a dorsal nasal flap was deemed most appropriate. Using a sterile surgical marker, an appropriate dorsal nasal flap was drawn around the defect. The area thus outlined was incised deep to adipose tissue with a #15 scalpel blade. The skin margins were undermined to an appropriate distance in all directions utilizing iris scissors. Following this, the designed flap was carried into the primary defect and sutured into place.
Preparation Of Recipient Site - Flap Takedown: The eschar and granulation tissue was removed surgically with sharp dissection to facilitate appropriate healing after division and inset of the proximal and distal interpolation flap.
Mohs Case Number (Optional): [id number]
Deep Sutures: 3-0 Monocryl
Purse String (Simple) Text: Given the location of the defect and the characteristics of the surrounding skin a purse string closure was deemed most appropriate.  Undermining was performed circumferentially around the surgical defect.  A purse string suture was then placed and tightened.
Post-Care Instructions: I reviewed with the patient in detail post-care instructions. Patient is not to engage in any heavy lifting, exercise, or swimming for the next 14 days. Should the patient develop any fevers, chills, bleeding, severe pain patient will contact the office immediately.
Hemigard Intro: Due to skin fragility and wound tension, it was decided to use HEMIGARD adhesive retention suture devices to permit a linear closure. The skin was cleaned and dried for a 6cm distance away from the wound. Excessive hair, if present, was removed to allow for adhesion.
Rectangular Flap Text: The defect edges were debeveled with a #15 scalpel blade. Given the location of the defect and the proximity to free margins a rectangular flap was deemed most appropriate. Using a sterile surgical marker, an appropriate rectangular flap was drawn incorporating the defect. The area thus outlined was incised deep to adipose tissue with a #15 scalpel blade. The skin margins were undermined to an appropriate distance in all directions utilizing iris scissors. Following this, the designed flap was carried over into the primary defect and sutured into place.
Mastoid Interpolation Flap Text: A decision was made to reconstruct the defect utilizing an interpolation axial flap and a staged reconstruction.  A telfa template was made of the defect.  This telfa template was then used to outline the mastoid interpolation flap.  The donor area for the pedicle flap was then injected with anesthesia.  The flap was excised through the skin and subcutaneous tissue down to the layer of the underlying musculature.  The pedicle flap was carefully excised within this deep plane to maintain its blood supply.  The edges of the donor site were undermined.   The donor site was closed in a primary fashion.  The pedicle was then rotated into position and sutured.  Once the tube was sutured into place, adequate blood supply was confirmed with blanching and refill.  The pedicle was then wrapped with xeroform gauze and dressed appropriately with a telfa and gauze bandage to ensure continued blood supply and protect the attached pedicle.
Pinch Graft Text: The defect edges were debeveled with a #15 scalpel blade. Given the location of the defect, shape of the defect and the proximity to free margins a pinch graft was deemed most appropriate. Using a sterile surgical marker, the primary defect shape was transferred to the donor site. The area thus outlined was incised deep to adipose tissue with a #15 scalpel blade.  The harvested graft was then trimmed of adipose tissue until only dermis and epidermis was left. The skin margins of the secondary defect were undermined to an appropriate distance in all directions utilizing iris scissors.  The secondary defect was closed with interrupted buried subcutaneous sutures.  The skin edges were then re-apposed with running  sutures.  The skin graft was then placed in the primary defect and oriented appropriately.
Spiral Flap Text: The defect edges were debeveled with a #15 scalpel blade. Given the location of the defect, shape of the defect and the proximity to free margins a spiral flap was deemed most appropriate. Using a sterile surgical marker, an appropriate rotation flap was drawn incorporating the defect and placing the expected incisions within the relaxed skin tension lines where possible. The area thus outlined was incised deep to adipose tissue with a #15 scalpel blade. The skin margins were undermined to an appropriate distance in all directions utilizing iris scissors. Following this, the designed flap was carried over into the primary defect and sutured into place.
Retention Suture Bite Size: 3 mm
Double O-Z Plasty Text: The defect edges were debeveled with a #15 scalpel blade. Given the location of the defect, shape of the defect and the proximity to free margins a Double O-Z plasty (double transposition flap) was deemed most appropriate. Using a sterile surgical marker, the appropriate transposition flaps were drawn incorporating the defect and placing the expected incisions within the relaxed skin tension lines where possible. The area thus outlined was incised deep to adipose tissue with a #15 scalpel blade. The skin margins were undermined to an appropriate distance in all directions utilizing iris scissors. Hemostasis was achieved with electrocautery. The flaps were then transposed and carried over into place, one clockwise and the other counterclockwise, and anchored with interrupted buried subcutaneous sutures.
O-Z Flap Text: The defect edges were debeveled with a #15 scalpel blade. Given the location of the defect, shape of the defect and the proximity to free margins an O-Z flap was deemed most appropriate. Using a sterile surgical marker, an appropriate transposition flap was drawn incorporating the defect and placing the expected incisions within the relaxed skin tension lines where possible. The area thus outlined was incised deep to adipose tissue with a #15 scalpel blade. The skin margins were undermined to an appropriate distance in all directions utilizing iris scissors. Following this, the designed flap was carried over into the primary defect and sutured into place.
A-T Advancement Flap Text: The defect edges were debeveled with a #15 scalpel blade. Given the location of the defect, shape of the defect and the proximity to free margins an A-T advancement flap was deemed most appropriate. Using a sterile surgical marker, an appropriate advancement flap was drawn incorporating the defect and placing the expected incisions within the relaxed skin tension lines where possible. The area thus outlined was incised deep to adipose tissue with a #15 scalpel blade. The skin margins were undermined to an appropriate distance in all directions utilizing iris scissors. Following this, the designed flap was advanced and carried over into the primary defect and sutured into place.
Primary Defect Width (In Cm): 2.2
Paramedian Forehead Flap Division And Inset Text: Division and inset of the paramedian forehead flap was performed to achieve optimal aesthetic result, restore normal anatomic appearance and avoid distortion of normal anatomy, expedite and facilitate wound healing, achieve optimal functional result and because linear closure either not possible or would produce suboptimal result. The patient was prepped and draped in the usual manner. The pedicle was infiltrated with local anesthesia. The pedicle was sectioned with a #15 blade. The pedicle was de-bulked and trimmed to match the shape of the defect. Hemostasis was achieved. The flap donor site and free margin of the flap were secured with deep buried sutures and the wound edges were re-approximated.
Vermilion Border Text: The closure involved the vermilion border.
Melolabial Interpolation Flap Text: A decision was made to reconstruct the defect utilizing an interpolation axial flap and a staged reconstruction.  A telfa template was made of the defect.  This telfa template was then used to outline the melolabial interpolation flap.  The donor area for the pedicle flap was then injected with anesthesia.  The flap was excised through the skin and subcutaneous tissue down to the layer of the underlying musculature.  The pedicle flap was carefully excised within this deep plane to maintain its blood supply.  The edges of the donor site were undermined.   The donor site was closed in a primary fashion.  The pedicle was then rotated into position and sutured.  Once the tube was sutured into place, adequate blood supply was confirmed with blanching and refill.  The pedicle was then wrapped with xeroform gauze and dressed appropriately with a telfa and gauze bandage to ensure continued blood supply and protect the attached pedicle.
Bilateral Rotation Flap Text: The defect edges were debeveled with a #15 scalpel blade. Given the location of the defect, shape of the defect and the proximity to free margins a bilateral rotation flap was deemed most appropriate. Using a sterile surgical marker, an appropriate rotation flap was drawn incorporating the defect and placing the expected incisions within the relaxed skin tension lines where possible. The area thus outlined was incised deep to adipose tissue with a #15 scalpel blade. The skin margins were undermined to an appropriate distance in all directions utilizing iris scissors. Following this, the designed flap was carried over into the primary defect and sutured into place.
Split-Thickness Skin Graft Text: The defect edges were debeveled with a #15 scalpel blade. Given the location of the defect, shape of the defect and the proximity to free margins a split thickness skin graft was deemed most appropriate. Using a sterile surgical marker, the primary defect shape was transferred to the donor site. The split thickness graft was then harvested.  The skin graft was then placed in the primary defect and oriented appropriately.
Primary Defect Length (In Cm): 2.5
O-Z Plasty Text: The defect edges were debeveled with a #15 scalpel blade. Given the location of the defect, shape of the defect and the proximity to free margins an O-Z plasty (double transposition flap) was deemed most appropriate. Using a sterile surgical marker, the appropriate transposition flaps were drawn incorporating the defect and placing the expected incisions within the relaxed skin tension lines where possible. The area thus outlined was incised deep to adipose tissue with a #15 scalpel blade. The skin margins were undermined to an appropriate distance in all directions utilizing iris scissors. Hemostasis was achieved with electrocautery. The flaps were then transposed and carried over into place, one clockwise and the other counterclockwise, and anchored with interrupted buried subcutaneous sutures.
Flip-Flop Flap Text: The defect edges were debeveled with a #15 blade scalpel.  Given the location of the defect and the proximity to free margins a flip-flop flap was deemed most appropriate. Using a sterile surgical marker, the appropriate flap was drawn incorporating the defect and placing the expected incisions between the helical rim and antihelix where possible.  The area thus outlined was incised through and through with a #15 scalpel blade. Following this, the designed flap was carried over into the primary defect and sutured into place.
O-L Flap Text: The defect edges were debeveled with a #15 scalpel blade. Given the location of the defect, shape of the defect and the proximity to free margins an O-L flap was deemed most appropriate. Using a sterile surgical marker, an appropriate advancement flap was drawn incorporating the defect and placing the expected incisions within the relaxed skin tension lines where possible. The area thus outlined was incised deep to adipose tissue with a #15 scalpel blade. The skin margins were undermined to an appropriate distance in all directions utilizing iris scissors. Following this, the designed flap was advanced and carried over into the primary defect and sutured into place.
Mastoid Interpolation Flap Division And Inset Text: Division and inset of the mastoid interpolation flap was performed to achieve optimal aesthetic result, restore normal anatomic appearance and avoid distortion of normal anatomy, expedite and facilitate wound healing, achieve optimal functional result and because linear closure either not possible or would produce suboptimal result. The patient was prepped and draped in the usual manner. The pedicle was infiltrated with local anesthesia. The pedicle was sectioned with a #15 blade. The pedicle was de-bulked and trimmed to match the shape of the defect. Hemostasis was achieved. The flap donor site and free margin of the flap were secured with deep buried sutures and the wound edges were re-approximated.
Undermining Type: Entire Wound
Helical Rim Text: The closure involved the helical rim.
Posterior Auricular Interpolation Flap Division And Inset Text: Division and inset of the posterior auricular interpolation flap was performed to achieve optimal aesthetic result, restore normal anatomic appearance and avoid distortion of normal anatomy, expedite and facilitate wound healing, achieve optimal functional result and because linear closure either not possible or would produce suboptimal result. The patient was prepped and draped in the usual manner. The pedicle was infiltrated with local anesthesia. The pedicle was sectioned with a #15 blade. The pedicle was de-bulked and trimmed to match the shape of the defect. Hemostasis was achieved. The flap donor site and free margin of the flap were secured with deep buried sutures and the wound edges were re-approximated.
Bilobed Flap Text: The defect edges were debeveled with a #15 scalpel blade. Given the location of the defect and the proximity to free margins a bilobe flap was deemed most appropriate. Using a sterile surgical marker, an appropriate bilobe flap drawn around the defect. The area thus outlined was incised deep to adipose tissue with a #15 scalpel blade. The skin margins were undermined to an appropriate distance in all directions utilizing iris scissors. Following this, the designed flap was carried over into the primary defect and sutured into place.
Complex Repair And Flap Additional Text (Will Appearing After The Standard Complex Repair Text): The complex repair was not sufficient to completely close the primary defect. The remaining additional defect was repaired with the flap mentioned below.
Referred To Oculoplastics For Closure Text (Leave Blank If You Do Not Want): After obtaining clear surgical margins the patient was sent to oculoplastics for surgical repair.  The patient understands they will receive post-surgical care and follow-up from the referring physician's office.
Skin Substitute Injection Text: The defect edges were debeveled with a #15 scalpel blade. Given the location of the defect, shape of the defect and the proximity to free margins a skin substitute micronized graft was deemed most appropriate.  The entire vial contents were admixed with 3.0ccs of sterile saline and then injected subcutaneously throughout the entire wound bed.
Nasalis Myocutaneous Flap Text: Using a #15 blade, an incision was made around the donor flap to the level of the nasalis muscle. Wide lateral undermining was then performed in both the subcutaneous plane above the nasalis muscle, and in a submuscular plane just above periosteum. This allowed the formation of a free nasalis muscle axial pedicle which was still attached to the actual cutaneous flap, increasing its mobility and vascular viability. Hemostasis was obtained with pinpoint electrocoagulation. The flap was mobilized into position and the pivotal anchor points positioned and stabilized with buried interrupted sutures. Subcutaneous and dermal tissues were closed in a multilayered fashion with sutures. Tissue redundancies were excised, and the epidermal edges were apposed without significant tension and sutured with sutures.
Cheek-To-Nose Interpolation Flap Text: A decision was made to reconstruct the defect utilizing an interpolation axial flap and a staged reconstruction.  A telfa template was made of the defect.  This telfa template was then used to outline the Cheek-To-Nose Interpolation flap.  The donor area for the pedicle flap was then injected with anesthesia.  The flap was excised through the skin and subcutaneous tissue down to the layer of the underlying musculature.  The interpolation flap was carefully excised within this deep plane to maintain its blood supply.  The edges of the donor site were undermined.   The donor site was closed in a primary fashion.  The pedicle was then rotated into position and sutured.  Once the tube was sutured into place, adequate blood supply was confirmed with blanching and refill.  The pedicle was then wrapped with xeroform gauze and dressed appropriately with a telfa and gauze bandage to ensure continued blood supply and protect the attached pedicle.
Hatchet Flap Text: The defect edges were debeveled with a #15 scalpel blade. Given the location of the defect, shape of the defect and the proximity to free margins a hatchet flap was deemed most appropriate. Using a sterile surgical marker, an appropriate hatchet flap was drawn incorporating the defect and placing the expected incisions within the relaxed skin tension lines where possible. The area thus outlined was incised deep to adipose tissue with a #15 scalpel blade. The skin margins were undermined to an appropriate distance in all directions utilizing iris scissors. Following this, the designed flap was carried over into the primary defect and sutured into place.
Full Thickness Lip Wedge Repair (Flap) Text: Given the location of the defect and the proximity to free margins a full thickness wedge repair was deemed most appropriate. Using a sterile surgical marker, the appropriate repair was drawn incorporating the defect and placing the expected incisions perpendicular to the vermilion border.  The vermilion border was also meticulously outlined to ensure appropriate reapproximation during the repair.  The area thus outlined was incised through and through with a #15 scalpel blade.  The muscularis and dermis were reaproximated with deep sutures following hemostasis. Care was taken to realign the vermilion border before proceeding with the superficial closure.  Once the vermilion was realigned the superfical and mucosal closure was finished.
Keystone Flap Text: The defect edges were debeveled with a #15 scalpel blade. Given the location of the defect, shape of the defect a keystone flap was deemed most appropriate. Using a sterile surgical marker, an appropriate keystone flap was drawn incorporating the defect, outlining the appropriate donor tissue and placing the expected incisions within the relaxed skin tension lines where possible. The area thus outlined was incised deep to adipose tissue with a #15 scalpel blade. The skin margins were undermined to an appropriate distance in all directions around the primary defect and laterally outward around the flap utilizing iris scissors. Following this, the designed flap was carried into the primary defect and sutured into place.
Repair Type: Complex Repair
Cheek To Nose Interpolation Flap Division And Inset Text: Division and inset of the cheek to nose interpolation flap was performed to achieve optimal aesthetic result, restore normal anatomic appearance and avoid distortion of normal anatomy, expedite and facilitate wound healing, achieve optimal functional result and because linear closure either not possible or would produce suboptimal result. The patient was prepped and draped in the usual manner. The pedicle was infiltrated with local anesthesia. The pedicle was sectioned with a #15 blade. The pedicle was de-bulked and trimmed to match the shape of the defect. Hemostasis was achieved. The flap donor site and free margin of the flap were secured with deep buried sutures and the wound edges were re-approximated.
Ear Star Wedge Flap Text: The defect edges were debeveled with a #15 blade scalpel.  Given the location of the defect and the proximity to free margins (helical rim) an ear star wedge flap was deemed most appropriate. Using a sterile surgical marker, the appropriate flap was drawn incorporating the defect and placing the expected incisions between the helical rim and antihelix where possible.  The area thus outlined was incised through and through with a #15 scalpel blade. Following this, the designed flap was carried over into the primary defect and sutured into place.
Intermediate Repair And Flap Additional Text (Will Appearing After The Standard Complex Repair Text): The intermediate repair was not sufficient to completely close the primary defect. The remaining additional defect was repaired with the flap mentioned below.
Referred To Plastics For Closure Text (Leave Blank If You Do Not Want): After obtaining clear surgical margins the patient was sent to plastics for surgical repair.  The patient understands they will receive post-surgical care and follow-up from the referring physician's office.
Skin Substitute Text: The defect edges were debeveled with a #15 scalpel blade. Given the location of the defect, shape of the defect and the proximity to free margins a skin substitute graft was deemed most appropriate.  The graft material was trimmed to fit the size of the defect. The graft was then placed in the primary defect and oriented appropriately.
Nasal Turnover Hinge Flap Text: The defect edges were debeveled with a #15 scalpel blade.  Given the size, depth, location of the defect and the defect being full thickness a nasal turnover hinge flap was deemed most appropriate. Using a sterile surgical marker, an appropriate hinge flap was drawn incorporating the defect. The area thus outlined was incised with a #15 scalpel blade. The flap was designed to recreate the nasal mucosal lining and the alar rim. The skin margins were undermined to an appropriate distance in all directions utilizing iris scissors. Following this, the designed flap was carried over into the primary defect and sutured into place
Estimated Blood Loss (Cc): minimal
Adjacent Tissue Transfer Text: The defect edges were debeveled with a #15 scalpel blade. Given the location of the defect and the proximity to free margins an adjacent tissue transfer was deemed most appropriate. Using a sterile surgical marker, an appropriate flap was drawn incorporating the defect and placing the expected incisions within the relaxed skin tension lines where possible. The area thus outlined was incised deep to adipose tissue with a #15 scalpel blade. The skin margins were undermined to an appropriate distance in all directions utilizing iris scissors and carried over to close the primary defect.
Zygomaticofacial Flap Text: Given the location of the defect, shape of the defect and the proximity to free margins a zygomaticofacial flap was deemed most appropriate for repair. Using a sterile surgical marker, the appropriate flap was drawn incorporating the defect and placing the expected incisions within the relaxed skin tension lines where possible. The area thus outlined was incised deep to adipose tissue with a #15 scalpel blade with preservation of a vascular pedicle.  The skin margins were undermined to an appropriate distance in all directions utilizing iris scissors. The flap was then carried over into the defect and anchored with interrupted buried subcutaneous sutures.
Nasolabial Transposition Flap Text: The defect edges were debeveled with a #15 scalpel blade.  Given the size, depth and location of the defect and the proximity to free margins a nasolabial transposition flap was deemed most appropriate. Using a sterile surgical marker, an appropriate flap was drawn incorporating the defect. The area thus outlined was incised with a #15 scalpel blade. The skin margins were undermined to an appropriate distance in all directions utilizing iris scissors. Following this, the designed flap was carried into the primary defect and sutured into place.
Cheiloplasty (Complex) Text: A decision was made to reconstruct the defect with a  cheiloplasty.  The defect was undermined extensively.  Additional orbicularis oris muscle was excised with a 15 blade scalpel.  The defect was converted into a full thickness wedge to facilite a better cosmetic result.  Small vessels were then tied off with 5-0 monocyrl. The orbicularis oris, superficial fascia, adipose and dermis were then reapproximated.  After the deeper layers were approximated the epidermis was reapproximated with particular care given to realign the vermilion border.
Which Eyelid Repair Cpt Are You Using?: 33539
Mucosal Advancement Flap Text: Given the location of the defect, shape of the defect and the proximity to free margins a mucosal advancement flap was deemed most appropriate. Incisions were made with a 15 blade scalpel in the appropriate fashion along the cutaneous vermilion border and the mucosal lip. The remaining actinically damaged mucosal tissue was excised.  The mucosal advancement flap was then elevated to the gingival sulcus with care taken to preserve the neurovascular structures and advanced into the primary defect. Care was taken to ensure that precise realignment of the vermilion border was achieved.
Non-Graft Cartilage Fenestration Text: The cartilage was fenestrated with a 2mm punch biopsy to help facilitate healing.
Double Island Pedicle Flap Text: The defect edges were debeveled with a #15 scalpel blade. Given the location of the defect, shape of the defect and the proximity to free margins a double island pedicle advancement flap was deemed most appropriate. Using a sterile surgical marker, an appropriate advancement flap was drawn incorporating the defect, outlining the appropriate donor tissue and placing the expected incisions within the relaxed skin tension lines where possible. The area thus outlined was incised deep to adipose tissue with a #15 scalpel blade. The skin margins were undermined to an appropriate distance in all directions around the primary defect and laterally outward around the island pedicle utilizing iris scissors.  There was minimal undermining beneath the pedicle flap. Following this, the flap was carried over into the primary defect and sutured into place.
Closure 4 Information: This tab is for additional flaps and grafts above and beyond our usual structured repairs.  Please note if you enter information here it will not currently bill and you will need to add the billing information manually.
Detail Level: Detailed
Skin Substitute: EpiFix Micronized
Localized Dermabrasion With Wire Brush Text: The patient was draped in routine manner.  Localized dermabrasion using 3 x 17 mm wire brush was performed in routine manner to papillary dermis. This spot dermabrasion is being performed to complete skin cancer reconstruction. It also will eliminate the other sun damaged precancerous cells that are known to be part of the regional effect of a lifetime's worth of sun exposure. This localized dermabrasion is therapeutic and should not be considered cosmetic in any regard.
Hemostasis: Electrocautery
Cheiloplasty (Less Than 50%) Text: A decision was made to reconstruct the defect with a  cheiloplasty.  The defect was undermined extensively.  Additional orbicularis oris muscle was excised with a 15 blade scalpel.  The defect was converted into a full thickness wedge, of less than 50% of the vertical height of the lip, to facilite a better cosmetic result.  Small vessels were then tied off with 5-0 monocyrl. The orbicularis oris, superficial fascia, adipose and dermis were then reapproximated.  After the deeper layers were approximated the epidermis was reapproximated with particular care given to realign the vermilion border.
Where Do You Want The Question To Include Opioid Counseling Located?: Case Summary Tab
Graft Cartilage Fenestration Text: The cartilage was fenestrated with a 2mm punch biopsy to help facilitate graft survival and healing.
Alar Island Pedicle Flap Text: The defect edges were debeveled with a #15 scalpel blade. Given the location of the defect, shape of the defect and the proximity to the alar rim an island pedicle advancement flap was deemed most appropriate. Using a sterile surgical marker, an appropriate advancement flap was drawn incorporating the defect, outlining the appropriate donor tissue and placing the expected incisions within the nasal ala running parallel to the alar rim. The area thus outlined was incised with a #15 scalpel blade. The skin margins were undermined minimally to an appropriate distance in all directions around the primary defect and laterally outward around the island pedicle utilizing iris scissors.  There was minimal undermining beneath the pedicle flap. Following this, the designed flap was carried over into the primary defect and sutured into place.
Eyelid Partial Thickness Repair - 92231: The eyelid defect was partial thickness which required a wedge repair of the eyelid. Special care was taken to ensure that the eyelid margin was realligned when placing sutures.
Bi-Rhombic Flap Text: The defect edges were debeveled with a #15 scalpel blade. Given the location of the defect and the proximity to free margins a bi-rhombic flap was deemed most appropriate. Using a sterile surgical marker, an appropriate rhombic flap was drawn incorporating the defect. The area thus outlined was incised deep to adipose tissue with a #15 scalpel blade. The skin margins were undermined to an appropriate distance in all directions utilizing iris scissors. Following this, the designed flap was carried over into the primary defect and sutured into place.
Partial Purse String (Intermediate) Text: Given the location of the defect and the characteristics of the surrounding skin an intermediate purse string closure was deemed most appropriate.  Undermining was performed circumferentially around the surgical defect.  A purse string suture was then placed and tightened. Wound tension only allowed a partial closure of the circular defect.
Unna Boot Text: An Unna boot was placed to help immobilize the limb and facilitate more rapid healing.
Additional Anesthesia Volume In Cc: 6
Brow Lift Text: A midfrontal incision was made medially to the defect to allow access to the tissues just superior to the left eyebrow. Following careful dissection inferiorly in a supraperiosteal plane to the level of the left eyebrow, several 3-0 monocryl sutures were used to resuspend the eyebrow orbicularis oculi muscular unit to the superior frontal bone periosteum. This resulted in an appropriate reapproximation of static eyebrow symmetry and correction of the left brow ptosis.
Epidermal Closure Graft Donor Site (Optional): simple interrupted
Abbe Flap (Upper To Lower Lip) Text: The defect of the lower lip was assessed and measured.  Given the location and size of the defect, an Abbe flap was deemed most appropriate. Using a sterile surgical marker, an appropriate Abbe flap was measured and drawn on the upper lip. Local anesthesia was then infiltrated.  A scalpel was then used to incise the upper lip through and through the skin, vermilion, muscle and mucosa, leaving the flap pedicled on the opposite side.  The flap was then rotated and transferred to the lower lip defect.  The flap was then sutured into place with a three layer technique, closing the orbicularis oris muscle layer with subcutaneous buried sutures, followed by a mucosal layer and an epidermal layer.
Repair Performed By Another Provider Text (Leave Blank If You Do Not Want): After obtaining clear surgical margins the defect was repaired by another provider.
Transposition Flap Text: The defect edges were debeveled with a #15 scalpel blade. Given the location of the defect and the proximity to free margins a transposition flap was deemed most appropriate. Using a sterile surgical marker, an appropriate transposition flap was drawn incorporating the defect. The area thus outlined was incised deep to adipose tissue with a #15 scalpel blade. The skin margins were undermined to an appropriate distance in all directions utilizing iris scissors. Following this, the designed flap was carried over into the primary defect and sutured into place.
Simple / Intermediate / Complex Repair - Final Wound Length In Cm: 4.5
Composite Graft Text: The defect edges were debeveled with a #15 scalpel blade. Given the location of the defect, shape of the defect, the proximity to free margins and the fact the defect was full thickness a composite graft was deemed most appropriate.  The defect was outline and then transferred to the donor site.  A full thickness graft was then excised from the donor site. The graft was then placed in the primary defect, oriented appropriately and then sutured into place.  The secondary defect was then repaired using a primary closure.
Epidermal Closure: running
Burow's Advancement Flap Text: The defect edges were debeveled with a #15 scalpel blade. Given the location of the defect and the proximity to free margins a Burow's advancement flap was deemed most appropriate. Using a sterile surgical marker, the appropriate advancement flap was drawn incorporating the defect and placing the expected incisions within the relaxed skin tension lines where possible. The area thus outlined was incised deep to adipose tissue with a #15 scalpel blade. The skin margins were undermined to an appropriate distance in all directions utilizing iris scissors. Following this, the designed flap was advanced and carried over into the primary defect and sutured into place.
Flap Thinning Complex Repair Preamble Text (Leave Blank If You Do Not Want): An incision was made along the previous flap suture line. Undermining was performed beneath the flap and redundant tissue was removed to restore the normal contour of the skin.
W Plasty Text: The lesion was extirpated to the level of the fat with a #15 scalpel blade. Given the location of the defect, shape of the defect and the proximity to free margins a W-plasty was deemed most appropriate for repair. Using a sterile surgical marker, the appropriate transposition arms of the W-plasty were drawn incorporating the defect and placing the expected incisions within the relaxed skin tension lines where possible. The area thus outlined was incised deep to adipose tissue with a #15 scalpel blade. The skin margins were undermined to an appropriate distance in all directions utilizing iris scissors. The opposing transposition arms were then transposed and carried over into place in opposite direction and anchored with interrupted buried subcutaneous sutures.
Advancement-Rotation Flap Text: The defect edges were debeveled with a #15 scalpel blade. Given the location of the defect, shape of the defect and the proximity to free margins an advancement-rotation flap was deemed most appropriate. Using a sterile surgical marker, an appropriate flap was drawn incorporating the defect and placing the expected incisions within the relaxed skin tension lines where possible. The area thus outlined was incised deep to adipose tissue with a #15 scalpel blade. The skin margins were undermined to an appropriate distance in all directions utilizing iris scissors. Following this, the designed flap was carried over into the primary defect and sutured into place.
Island Pedicle Flap Text: The defect edges were debeveled with a #15 scalpel blade. Given the location of the defect, shape of the defect and the proximity to free margins an island pedicle advancement flap was deemed most appropriate. Using a sterile surgical marker, an appropriate advancement flap was drawn incorporating the defect, outlining the appropriate donor tissue and placing the expected incisions within the relaxed skin tension lines where possible. The area thus outlined was incised deep to adipose tissue with a #15 scalpel blade. The skin margins were undermined to an appropriate distance in all directions around the primary defect and laterally outward around the island pedicle utilizing iris scissors.  There was minimal undermining beneath the pedicle flap. Following this, the flap was carried over into the primary defect and sutured into place.
Preparation Of Recipient Site - Graft: The eschar was removed surgically with sharp dissection to facilitate appropriate survival of the following graft.
Graft Donor Site Bandage (Optional-Leave Blank If You Don't Want In Note): Steri-strips and a pressure bandage were applied to the donor site.
Number Of Hemigard Strips Per Side: 1
Rhombic Flap Text: The defect edges were debeveled with a #15 scalpel blade. Given the location of the defect and the proximity to free margins a rhombic flap was deemed most appropriate. Using a sterile surgical marker, an appropriate rhombic flap was drawn incorporating the defect. The area thus outlined was incised deep to adipose tissue with a #15 scalpel blade. The skin margins were undermined to an appropriate distance in all directions utilizing iris scissors. Following this, the designed flap was carried over into the primary defect and sutured into place.
Consent: The rationale for the repair was explained to the patient and consent was obtained. The risks, benefits and alternatives to therapy were discussed in detail. Specifically, the risks of infection, scarring, bleeding, prolonged wound healing, incomplete removal, allergy to anesthesia, nerve injury and recurrence were addressed. Prior to the procedure, the treatment site was clearly identified and confirmed by the patient. All components of Universal Protocol/PAUSE Rule completed.
Purse String (Intermediate) Text: Given the location of the defect and the characteristics of the surrounding skin a purse string intermediate closure was deemed most appropriate.  Undermining was performed circumferentially around the surgical defect.  A purse string suture was then placed and tightened.
Suturegard Body: The suture ends were repeatedly re-tightened and re-clamped to achieve the desired tissue expansion.
Posterior Auricular Interpolation Flap Text: A decision was made to reconstruct the defect utilizing an interpolation axial flap and a staged reconstruction.  A telfa template was made of the defect.  This telfa template was then used to outline the posterior auricular interpolation flap.  The donor area for the pedicle flap was then injected with anesthesia.  The flap was excised through the skin and subcutaneous tissue down to the layer of the underlying musculature.  The pedicle flap was carefully excised within this deep plane to maintain its blood supply.  The edges of the donor site were undermined.   The donor site was closed in a primary fashion.  The pedicle was then rotated into position and sutured.  Once the tube was sutured into place, adequate blood supply was confirmed with blanching and refill.  The pedicle was then wrapped with xeroform gauze and dressed appropriately with a telfa and gauze bandage to ensure continued blood supply and protect the attached pedicle.
Staged Advancement Flap Text: The defect edges were debeveled with a #15 scalpel blade. Given the location of the defect, shape of the defect and the proximity to free margins a staged advancement flap was deemed most appropriate. Using a sterile surgical marker, an appropriate advancement flap was drawn incorporating the defect and placing the expected incisions within the relaxed skin tension lines where possible. The area thus outlined was incised deep to adipose tissue with a #15 scalpel blade. The skin margins were undermined to an appropriate distance in all directions utilizing iris scissors. Following this, the designed flap was carried over into the primary defect and sutured into place.
Crescentic Advancement Flap Text: The defect edges were debeveled with a #15 scalpel blade. Given the location of the defect and the proximity to free margins a crescentic advancement flap was deemed most appropriate. Using a sterile surgical marker, the appropriate advancement flap was drawn incorporating the defect and placing the expected incisions within the relaxed skin tension lines where possible. The area thus outlined was incised deep to adipose tissue with a #15 scalpel blade. The skin margins were undermined to an appropriate distance in all directions utilizing iris scissors. Following this, the designed flap was advanced and carried over into the primary defect and sutured into place.
Burow's Graft Text: The defect edges were debeveled with a #15 scalpel blade. Given the location of the defect, shape of the defect, the proximity to free margins and the presence of a standing cone deformity a Burow's skin graft was deemed most appropriate. The standing cone was removed and this tissue was then trimmed to the shape of the primary defect. The adipose tissue was also removed until only dermis and epidermis were left.  The skin margins of the secondary defect were undermined to an appropriate distance in all directions utilizing iris scissors.  The secondary defect was closed with interrupted buried subcutaneous sutures.  The skin edges were then re-apposed with running  sutures.  The skin graft was then placed in the primary defect and oriented appropriately.
Double O-Z Flap Text: The defect edges were debeveled with a #15 scalpel blade. Given the location of the defect, shape of the defect and the proximity to free margins a Double O-Z flap was deemed most appropriate. Using a sterile surgical marker, an appropriate transposition flap was drawn incorporating the defect and placing the expected incisions within the relaxed skin tension lines where possible. The area thus outlined was incised deep to adipose tissue with a #15 scalpel blade. The skin margins were undermined to an appropriate distance in all directions utilizing iris scissors. Following this, the designed flap was carried over into the primary defect and sutured into place.
V-Y Plasty Text: The defect edges were debeveled with a #15 scalpel blade. Given the location of the defect, shape of the defect and the proximity to free margins an V-Y advancement flap was deemed most appropriate. Using a sterile surgical marker, an appropriate advancement flap was drawn incorporating the defect and placing the expected incisions within the relaxed skin tension lines where possible. The area thus outlined was incised deep to adipose tissue with a #15 scalpel blade. The skin margins were undermined to an appropriate distance in all directions utilizing iris scissors. Following this, the designed flap was advanced and carried over into the primary defect and sutured into place.
Anesthesia Type: 1% lidocaine with 1:100,000 epinephrine, 0.5% Marcaine and 8.4% sodium bicarbonate
Nostril Rim Text: The closure involved the nostril rim.
Closure 2 Information: This tab is for additional flaps and grafts, including complex repair and grafts and complex repair and flaps. You can also specify a different location for the additional defect, if the location is the same you do not need to select a new one. We will insert the automated text for the repair you select below just as we do for solitary flaps and grafts. Please note that at this time if you select a location with a different insurance zone you will need to override the ICD10 and CPT if appropriate.
Trilobed Flap Text: The defect edges were debeveled with a #15 scalpel blade. Given the location of the defect and the proximity to free margins a trilobed flap was deemed most appropriate. Using a sterile surgical marker, an appropriate trilobed flap was drawn around the defect. The area thus outlined was incised deep to adipose tissue with a #15 scalpel blade. The skin margins were undermined to an appropriate distance in all directions utilizing iris scissors. Following this, the designed flap was carried into the primary defect and sutured into place.
Secondary Intention Text (Leave Blank If You Do Not Want): The defect will heal with secondary intention.
Hemigard Postcare Instructions: The HEMIGARD strips are to remain completely dry for at least 5-7 days.
Xenograft Text: The defect edges were debeveled with a #15 scalpel blade. Given the location of the defect, shape of the defect and the proximity to free margins a xenograft was deemed most appropriate.  The graft was then trimmed to fit the size of the defect.  The graft was then placed in the primary defect and oriented appropriately.
Melolabial Transposition Flap Text: The defect edges were debeveled with a #15 scalpel blade. Given the location of the defect and the proximity to free margins a melolabial flap was deemed most appropriate. Using a sterile surgical marker, an appropriate melolabial transposition flap was drawn incorporating the defect. The area thus outlined was incised deep to adipose tissue with a #15 scalpel blade. The skin margins were undermined to an appropriate distance in all directions utilizing iris scissors. Following this, the designed flap was carried over into the primary defect and sutured into place.

## 2025-02-07 ENCOUNTER — CLINIC PROCEDURE ONLY (OUTPATIENT)
Age: 89
End: 2025-02-07

## 2025-02-07 DIAGNOSIS — H34.8320: ICD-10-CM

## 2025-02-07 DIAGNOSIS — H35.3231: ICD-10-CM

## 2025-02-07 PROCEDURE — 92250 FUNDUS PHOTOGRAPHY W/I&R: CPT

## 2025-02-07 PROCEDURE — 92134 CPTRZ OPH DX IMG PST SGM RTA: CPT

## 2025-02-07 PROCEDURE — 67028 INJECTION EYE DRUG: CPT | Mod: 50,RT

## 2025-02-07 PROCEDURE — J3490AVA AVASTIN *

## 2025-02-19 ENCOUNTER — APPOINTMENT (OUTPATIENT)
Dept: URBAN - METROPOLITAN AREA CLINIC 148 | Facility: CLINIC | Age: 89
Setting detail: DERMATOLOGY
End: 2025-02-19

## 2025-02-19 DIAGNOSIS — Z48.02 ENCOUNTER FOR REMOVAL OF SUTURES: ICD-10-CM

## 2025-02-19 DIAGNOSIS — L0391 CELLULITIS AND ABSCESS OF UNSPECIFIED SITES: ICD-10-CM

## 2025-02-19 DIAGNOSIS — L0390 CELLULITIS AND ABSCESS OF UNSPECIFIED SITES: ICD-10-CM

## 2025-02-19 DIAGNOSIS — L72.8 OTHER FOLLICULAR CYSTS OF THE SKIN AND SUBCUTANEOUS TISSUE: ICD-10-CM

## 2025-02-19 PROBLEM — L02.31 CUTANEOUS ABSCESS OF BUTTOCK: Status: ACTIVE | Noted: 2025-02-19

## 2025-02-19 PROCEDURE — 99024 POSTOP FOLLOW-UP VISIT: CPT

## 2025-02-19 PROCEDURE — ? COUNSELING

## 2025-02-19 PROCEDURE — ? PRESCRIPTION

## 2025-02-19 PROCEDURE — ? SUTURE REMOVAL (GLOBAL PERIOD)

## 2025-02-19 RX ORDER — GENTAMICIN SULFATE 1 MG/G
OINTMENT TOPICAL
Qty: 30 | Refills: 0 | Status: ERX | COMMUNITY
Start: 2025-02-19

## 2025-02-19 RX ORDER — GENTAMICIN SULFATE 1 MG/G
OINTMENT TOPICAL
Qty: 30 | Refills: 0 | Status: CANCELLED

## 2025-02-19 RX ADMIN — GENTAMICIN SULFATE: 1 OINTMENT TOPICAL at 00:00

## 2025-02-19 ASSESSMENT — LOCATION SIMPLE DESCRIPTION DERM
LOCATION SIMPLE: RIGHT BUTTOCK
LOCATION SIMPLE: SCALP

## 2025-02-19 ASSESSMENT — LOCATION ZONE DERM
LOCATION ZONE: TRUNK
LOCATION ZONE: SCALP

## 2025-02-19 ASSESSMENT — LOCATION DETAILED DESCRIPTION DERM
LOCATION DETAILED: LEFT SUPERIOR PARIETAL SCALP
LOCATION DETAILED: RIGHT BUTTOCK

## 2025-02-19 NOTE — PROCEDURE: SUTURE REMOVAL (GLOBAL PERIOD)
Detail Level: Simple
Add 41106 Cpt? (Important Note: In 2017 The Use Of 59570 Is Being Tracked By Cms To Determine Future Global Period Reimbursement For Global Periods): yes

## 2025-02-28 NOTE — ASU PREOP CHECKLIST - SPO2 (%)
You can access the FollowMyHealth Patient Portal offered by [facility] by registering at the following website: [email or web address]. By joining [email or web address]’s FollowMyHealth portal, you will also be able to view your health information using other applications (apps) compatible with our system. 97

## 2025-03-07 ENCOUNTER — CLINIC PROCEDURE ONLY (OUTPATIENT)
Age: 89
End: 2025-03-07

## 2025-03-07 DIAGNOSIS — H35.3231: ICD-10-CM

## 2025-03-07 DIAGNOSIS — H34.8320: ICD-10-CM

## 2025-03-07 PROCEDURE — 92134 CPTRZ OPH DX IMG PST SGM RTA: CPT

## 2025-03-07 PROCEDURE — 92250 FUNDUS PHOTOGRAPHY W/I&R: CPT

## 2025-03-07 PROCEDURE — 67028 INJECTION EYE DRUG: CPT

## 2025-03-07 PROCEDURE — J3490AVA AVASTIN *

## 2025-03-28 NOTE — PROCEDURE: MOHS SURGERY
IRIS Sandy Graft Donor Site Bandage (Optional-Leave Blank If You Don't Want In Note): Steri-strips and a pressure bandage were applied to the donor site.

## 2025-04-04 ENCOUNTER — CLINIC PROCEDURE ONLY (OUTPATIENT)
Age: 89
End: 2025-04-04

## 2025-04-04 DIAGNOSIS — H34.8320: ICD-10-CM

## 2025-04-04 DIAGNOSIS — H35.3231: ICD-10-CM

## 2025-04-04 PROCEDURE — J9035AVA22 AVASTIN 2.25 MG

## 2025-04-04 PROCEDURE — 67028 INJECTION EYE DRUG: CPT | Mod: 50,RT

## 2025-04-04 PROCEDURE — 92250 FUNDUS PHOTOGRAPHY W/I&R: CPT

## 2025-04-04 PROCEDURE — 92134 CPTRZ OPH DX IMG PST SGM RTA: CPT

## 2025-05-02 ENCOUNTER — CLINIC PROCEDURE ONLY (OUTPATIENT)
Age: 89
End: 2025-05-02

## 2025-05-02 DIAGNOSIS — H34.8320: ICD-10-CM

## 2025-05-02 DIAGNOSIS — H35.3231: ICD-10-CM

## 2025-05-02 PROCEDURE — J3490AVA22 AVASTIN 2.25MG

## 2025-05-02 PROCEDURE — 92250 FUNDUS PHOTOGRAPHY W/I&R: CPT

## 2025-05-02 PROCEDURE — 67028 INJECTION EYE DRUG: CPT | Mod: 50,RT

## 2025-05-02 PROCEDURE — 92134 CPTRZ OPH DX IMG PST SGM RTA: CPT

## 2025-05-14 ENCOUNTER — APPOINTMENT (OUTPATIENT)
Dept: URBAN - METROPOLITAN AREA CLINIC 148 | Facility: CLINIC | Age: 89
Setting detail: DERMATOLOGY
End: 2025-05-14

## 2025-05-14 DIAGNOSIS — L81.4 OTHER MELANIN HYPERPIGMENTATION: ICD-10-CM

## 2025-05-14 DIAGNOSIS — Z85.828 PERSONAL HISTORY OF OTHER MALIGNANT NEOPLASM OF SKIN: ICD-10-CM

## 2025-05-14 DIAGNOSIS — L82.1 OTHER SEBORRHEIC KERATOSIS: ICD-10-CM

## 2025-05-14 DIAGNOSIS — L57.0 ACTINIC KERATOSIS: ICD-10-CM

## 2025-05-14 DIAGNOSIS — T81.89 OTHER COMPLICATIONS OF PROCEDURES, NOT ELSEWHERE CLASSIFIED: ICD-10-CM

## 2025-05-14 PROBLEM — T81.89XA OTHER COMPLICATIONS OF PROCEDURES, NOT ELSEWHERE CLASSIFIED, INITIAL ENCOUNTER: Status: ACTIVE | Noted: 2025-05-14

## 2025-05-14 PROCEDURE — 99213 OFFICE O/P EST LOW 20 MIN: CPT | Mod: 25

## 2025-05-14 PROCEDURE — ? LIQUID NITROGEN

## 2025-05-14 PROCEDURE — ? PRESCRIPTION

## 2025-05-14 PROCEDURE — ? COUNSELING

## 2025-05-14 PROCEDURE — 17003 DESTRUCT PREMALG LES 2-14: CPT

## 2025-05-14 PROCEDURE — ? SUNSCREEN RECOMMENDATIONS

## 2025-05-14 PROCEDURE — 17000 DESTRUCT PREMALG LESION: CPT

## 2025-05-14 RX ORDER — GENTAMICIN SULFATE 1 MG/G
OINTMENT TOPICAL
Qty: 30 | Refills: 2 | Status: ERX

## 2025-05-14 ASSESSMENT — LOCATION SIMPLE DESCRIPTION DERM
LOCATION SIMPLE: RIGHT OCCIPITAL SCALP
LOCATION SIMPLE: LEFT OCCIPITAL SCALP
LOCATION SIMPLE: LEFT PRETIBIAL REGION
LOCATION SIMPLE: RIGHT UPPER BACK
LOCATION SIMPLE: RIGHT PRETIBIAL REGION
LOCATION SIMPLE: LEFT SCALP
LOCATION SIMPLE: CHEST
LOCATION SIMPLE: RIGHT UPPER ARM
LOCATION SIMPLE: LEFT UPPER BACK
LOCATION SIMPLE: LEFT FOREARM
LOCATION SIMPLE: SCALP
LOCATION SIMPLE: RIGHT TEMPLE

## 2025-05-14 ASSESSMENT — LOCATION DETAILED DESCRIPTION DERM
LOCATION DETAILED: LEFT SUPERIOR OCCIPITAL SCALP
LOCATION DETAILED: RIGHT MID TEMPLE
LOCATION DETAILED: LEFT PROXIMAL PRETIBIAL REGION
LOCATION DETAILED: RIGHT PROXIMAL PRETIBIAL REGION
LOCATION DETAILED: RIGHT DISTAL POSTERIOR UPPER ARM
LOCATION DETAILED: RIGHT MEDIAL INFERIOR CHEST
LOCATION DETAILED: LEFT CENTRAL PARIETAL SCALP
LOCATION DETAILED: LEFT MEDIAL FRONTAL SCALP
LOCATION DETAILED: RIGHT SUPERIOR OCCIPITAL SCALP
LOCATION DETAILED: LEFT SUPERIOR UPPER BACK
LOCATION DETAILED: LEFT PROXIMAL DORSAL FOREARM
LOCATION DETAILED: RIGHT INFERIOR TEMPLE
LOCATION DETAILED: RIGHT SUPERIOR MEDIAL UPPER BACK
LOCATION DETAILED: LEFT MEDIAL INFERIOR CHEST

## 2025-05-14 ASSESSMENT — LOCATION ZONE DERM
LOCATION ZONE: LEG
LOCATION ZONE: SCALP
LOCATION ZONE: TRUNK
LOCATION ZONE: ARM
LOCATION ZONE: FACE

## 2025-05-14 NOTE — PROCEDURE: LIQUID NITROGEN
Post-Care Instructions: I reviewed with the patient in detail post-care instructions. Patient is to wear sunprotection, and avoid picking at any of the treated lesions. Pt may apply Vaseline to crusted or scabbing areas.
Consent: The patient's consent was obtained including but not limited to risks of crusting, scabbing, blistering, scarring, darker or lighter pigmentary change, recurrence, incomplete removal and infection.
Show Aperture Variable?: Yes
Duration Of Freeze Thaw-Cycle (Seconds): 0
Render Note In Bullet Format When Appropriate: No
Number Of Freeze-Thaw Cycles: 2 freeze-thaw cycles
Detail Level: Detailed

## 2025-06-05 NOTE — DISCHARGE NOTE PROVIDER - NSDCCPTREATMENT_GEN_ALL_CORE_FT
PRINCIPAL PROCEDURE  Procedure: TAVR, open femoral artery approach  Findings and Treatment: Transfemoral TAVR via Right Common Femoral Artery Cutdown (29mm Spaien S3) (NCT# [id number]) (STS/ACC TVT Registry Patient ID# [id number]) none

## 2025-06-06 ENCOUNTER — CLINIC PROCEDURE ONLY (OUTPATIENT)
Age: 89
End: 2025-06-06

## 2025-06-06 DIAGNOSIS — H35.3231: ICD-10-CM

## 2025-06-06 DIAGNOSIS — H34.8320: ICD-10-CM

## 2025-06-06 PROCEDURE — 67028 INJECTION EYE DRUG: CPT | Mod: 50,RT

## 2025-06-06 PROCEDURE — 92134 CPTRZ OPH DX IMG PST SGM RTA: CPT

## 2025-06-06 PROCEDURE — 92250 FUNDUS PHOTOGRAPHY W/I&R: CPT | Mod: 59

## 2025-06-06 PROCEDURE — J3490AVA AVASTIN *

## 2025-07-30 ENCOUNTER — CLINIC PROCEDURE ONLY (OUTPATIENT)
Age: 89
End: 2025-07-30

## 2025-07-30 DIAGNOSIS — H35.3231: ICD-10-CM

## 2025-07-30 DIAGNOSIS — H34.8320: ICD-10-CM

## 2025-07-30 PROCEDURE — 92134 CPTRZ OPH DX IMG PST SGM RTA: CPT

## 2025-07-30 PROCEDURE — 67028 INJECTION EYE DRUG: CPT | Mod: 50,RT

## 2025-07-30 PROCEDURE — 92250 FUNDUS PHOTOGRAPHY W/I&R: CPT

## 2025-07-30 PROCEDURE — J3490AVAJZ AVASTIN, NO DRUG WASTE
